# Patient Record
Sex: MALE | Race: BLACK OR AFRICAN AMERICAN | NOT HISPANIC OR LATINO | Employment: UNEMPLOYED | URBAN - NONMETROPOLITAN AREA
[De-identification: names, ages, dates, MRNs, and addresses within clinical notes are randomized per-mention and may not be internally consistent; named-entity substitution may affect disease eponyms.]

---

## 2023-07-27 ENCOUNTER — HOSPITAL ENCOUNTER (INPATIENT)
Facility: HOSPITAL | Age: 53
LOS: 13 days | Discharge: HOME | DRG: 872 | End: 2023-08-09
Attending: STUDENT IN AN ORGANIZED HEALTH CARE EDUCATION/TRAINING PROGRAM | Admitting: STUDENT IN AN ORGANIZED HEALTH CARE EDUCATION/TRAINING PROGRAM

## 2023-07-27 ENCOUNTER — APPOINTMENT (OUTPATIENT)
Dept: CT IMAGING | Facility: HOSPITAL | Age: 53
DRG: 872 | End: 2023-07-27

## 2023-07-27 ENCOUNTER — APPOINTMENT (OUTPATIENT)
Dept: GENERAL RADIOLOGY | Facility: HOSPITAL | Age: 53
DRG: 872 | End: 2023-07-27

## 2023-07-27 DIAGNOSIS — A41.9 SEPSIS, DUE TO UNSPECIFIED ORGANISM, UNSPECIFIED WHETHER ACUTE ORGAN DYSFUNCTION PRESENT: Primary | ICD-10-CM

## 2023-07-27 DIAGNOSIS — N30.90 CYSTITIS: ICD-10-CM

## 2023-07-27 DIAGNOSIS — F10.939 ALCOHOL WITHDRAWAL SYNDROME WITH COMPLICATION: ICD-10-CM

## 2023-07-27 LAB
ALBUMIN SERPL-MCNC: 3.6 G/DL (ref 3.5–5.2)
ALBUMIN/GLOB SERPL: 0.9 G/DL
ALP SERPL-CCNC: 190 U/L (ref 39–117)
ALT SERPL W P-5'-P-CCNC: 73 U/L (ref 1–41)
AMPHET+METHAMPHET UR QL: NEGATIVE
AMPHETAMINES UR QL: NEGATIVE
ANION GAP SERPL CALCULATED.3IONS-SCNC: 12 MMOL/L (ref 5–15)
APTT PPP: 30.9 SECONDS (ref 26.5–34.5)
AST SERPL-CCNC: 190 U/L (ref 1–40)
BACTERIA UR QL AUTO: ABNORMAL /HPF
BARBITURATES UR QL SCN: NEGATIVE
BASOPHILS # BLD AUTO: 0.05 10*3/MM3 (ref 0–0.2)
BASOPHILS NFR BLD AUTO: 0.7 % (ref 0–1.5)
BENZODIAZ UR QL SCN: NEGATIVE
BILIRUB SERPL-MCNC: 0.8 MG/DL (ref 0–1.2)
BILIRUB UR QL STRIP: ABNORMAL
BUN SERPL-MCNC: 8 MG/DL (ref 6–20)
BUN/CREAT SERPL: 9.2 (ref 7–25)
BUPRENORPHINE SERPL-MCNC: NEGATIVE NG/ML
C TRACH RRNA CVX QL NAA+PROBE: NOT DETECTED
CALCIUM SPEC-SCNC: 8.7 MG/DL (ref 8.6–10.5)
CANNABINOIDS SERPL QL: NEGATIVE
CHLORIDE SERPL-SCNC: 92 MMOL/L (ref 98–107)
CHOLEST SERPL-MCNC: 138 MG/DL (ref 0–200)
CLARITY UR: ABNORMAL
CO2 SERPL-SCNC: 29 MMOL/L (ref 22–29)
COCAINE UR QL: NEGATIVE
COLOR UR: ABNORMAL
CREAT SERPL-MCNC: 0.87 MG/DL (ref 0.76–1.27)
D-LACTATE SERPL-SCNC: 1.6 MMOL/L (ref 0.5–2)
D-LACTATE SERPL-SCNC: 2.3 MMOL/L (ref 0.5–2)
D-LACTATE SERPL-SCNC: 3 MMOL/L (ref 0.5–2)
DEPRECATED RDW RBC AUTO: 51.2 FL (ref 37–54)
EGFRCR SERPLBLD CKD-EPI 2021: 103.8 ML/MIN/1.73
EOSINOPHIL # BLD AUTO: 0 10*3/MM3 (ref 0–0.4)
EOSINOPHIL NFR BLD AUTO: 0 % (ref 0.3–6.2)
ERYTHROCYTE [DISTWIDTH] IN BLOOD BY AUTOMATED COUNT: 18.7 % (ref 12.3–15.4)
ETHANOL BLD-MCNC: <10 MG/DL (ref 0–10)
ETHANOL UR QL: <0.01 %
FENTANYL UR-MCNC: NEGATIVE NG/ML
GEN 5 2HR TROPONIN T REFLEX: 22 NG/L
GLOBULIN UR ELPH-MCNC: 4.1 GM/DL
GLUCOSE SERPL-MCNC: 128 MG/DL (ref 65–99)
GLUCOSE UR STRIP-MCNC: NEGATIVE MG/DL
HAV IGM SERPL QL IA: ABNORMAL
HBA1C MFR BLD: 5.6 % (ref 4.8–5.6)
HBV CORE IGM SERPL QL IA: ABNORMAL
HBV SURFACE AG SERPL QL IA: ABNORMAL
HCT VFR BLD AUTO: 31.3 % (ref 37.5–51)
HCV AB SER DONR QL: REACTIVE
HDLC SERPL-MCNC: 110 MG/DL (ref 40–60)
HGB BLD-MCNC: 10.1 G/DL (ref 13–17.7)
HGB UR QL STRIP.AUTO: ABNORMAL
HIV1+2 AB SER QL: NORMAL
HYALINE CASTS UR QL AUTO: ABNORMAL /LPF
IMM GRANULOCYTES # BLD AUTO: 0.06 10*3/MM3 (ref 0–0.05)
IMM GRANULOCYTES NFR BLD AUTO: 0.9 % (ref 0–0.5)
INR PPP: 1.02 (ref 0.9–1.1)
KETONES UR QL STRIP: NEGATIVE
LDLC SERPL CALC-MCNC: 16 MG/DL (ref 0–100)
LDLC/HDLC SERPL: 0.16 {RATIO}
LEUKOCYTE ESTERASE UR QL STRIP.AUTO: ABNORMAL
LIPASE SERPL-CCNC: 19 U/L (ref 13–60)
LYMPHOCYTES # BLD AUTO: 0.76 10*3/MM3 (ref 0.7–3.1)
LYMPHOCYTES NFR BLD AUTO: 11.4 % (ref 19.6–45.3)
MAGNESIUM SERPL-MCNC: 1.2 MG/DL (ref 1.6–2.6)
MAGNESIUM SERPL-MCNC: 1.4 MG/DL (ref 1.6–2.6)
MCH RBC QN AUTO: 25.3 PG (ref 26.6–33)
MCHC RBC AUTO-ENTMCNC: 32.3 G/DL (ref 31.5–35.7)
MCV RBC AUTO: 78.3 FL (ref 79–97)
METHADONE UR QL SCN: NEGATIVE
MONOCYTES # BLD AUTO: 0.64 10*3/MM3 (ref 0.1–0.9)
MONOCYTES NFR BLD AUTO: 9.6 % (ref 5–12)
N GONORRHOEA RRNA SPEC QL NAA+PROBE: NOT DETECTED
NEUTROPHILS NFR BLD AUTO: 5.17 10*3/MM3 (ref 1.7–7)
NEUTROPHILS NFR BLD AUTO: 77.4 % (ref 42.7–76)
NITRITE UR QL STRIP: NEGATIVE
NRBC BLD AUTO-RTO: 0.3 /100 WBC (ref 0–0.2)
OPIATES UR QL: NEGATIVE
OXYCODONE UR QL SCN: NEGATIVE
PCP UR QL SCN: NEGATIVE
PH UR STRIP.AUTO: 7 [PH] (ref 5–8)
PHOSPHATE SERPL-MCNC: 4.4 MG/DL (ref 2.5–4.5)
PLATELET # BLD AUTO: 128 10*3/MM3 (ref 140–450)
PMV BLD AUTO: 9.6 FL (ref 6–12)
POTASSIUM SERPL-SCNC: 4 MMOL/L (ref 3.5–5.2)
PROCALCITONIN SERPL-MCNC: 0.39 NG/ML (ref 0–0.25)
PROPOXYPH UR QL: NEGATIVE
PROT ?TM UR-MCNC: 77.5 MG/DL
PROT SERPL-MCNC: 7.7 G/DL (ref 6–8.5)
PROT UR QL STRIP: ABNORMAL
PROTHROMBIN TIME: 13.9 SECONDS (ref 12.1–14.7)
RBC # BLD AUTO: 4 10*6/MM3 (ref 4.14–5.8)
RBC # UR STRIP: ABNORMAL /HPF
REF LAB TEST METHOD: ABNORMAL
SARS-COV-2 RNA RESP QL NAA+PROBE: NOT DETECTED
SODIUM SERPL-SCNC: 133 MMOL/L (ref 136–145)
SP GR UR STRIP: 1.01 (ref 1–1.03)
SQUAMOUS #/AREA URNS HPF: ABNORMAL /HPF
TRANS CELLS #/AREA URNS HPF: ABNORMAL /HPF
TRICYCLICS UR QL SCN: NEGATIVE
TRIGL SERPL-MCNC: 53 MG/DL (ref 0–150)
TROPONIN T DELTA: -2 NG/L
TROPONIN T SERPL HS-MCNC: 24 NG/L
TSH SERPL DL<=0.05 MIU/L-ACNC: 1.12 UIU/ML (ref 0.27–4.2)
UROBILINOGEN UR QL STRIP: ABNORMAL
VLDLC SERPL-MCNC: 12 MG/DL (ref 5–40)
WBC # UR STRIP: ABNORMAL /HPF
WBC NRBC COR # BLD: 6.68 10*3/MM3 (ref 3.4–10.8)
YEAST URNS QL MICRO: ABNORMAL /HPF

## 2023-07-27 PROCEDURE — 74176 CT ABD & PELVIS W/O CONTRAST: CPT | Performed by: RADIOLOGY

## 2023-07-27 PROCEDURE — 25010000002 MAGNESIUM SULFATE 2 GM/50ML SOLUTION: Performed by: STUDENT IN AN ORGANIZED HEALTH CARE EDUCATION/TRAINING PROGRAM

## 2023-07-27 PROCEDURE — 80053 COMPREHEN METABOLIC PANEL: CPT | Performed by: STUDENT IN AN ORGANIZED HEALTH CARE EDUCATION/TRAINING PROGRAM

## 2023-07-27 PROCEDURE — 85025 COMPLETE CBC W/AUTO DIFF WBC: CPT | Performed by: STUDENT IN AN ORGANIZED HEALTH CARE EDUCATION/TRAINING PROGRAM

## 2023-07-27 PROCEDURE — 84484 ASSAY OF TROPONIN QUANT: CPT | Performed by: STUDENT IN AN ORGANIZED HEALTH CARE EDUCATION/TRAINING PROGRAM

## 2023-07-27 PROCEDURE — 87150 DNA/RNA AMPLIFIED PROBE: CPT | Performed by: STUDENT IN AN ORGANIZED HEALTH CARE EDUCATION/TRAINING PROGRAM

## 2023-07-27 PROCEDURE — 71275 CT ANGIOGRAPHY CHEST: CPT | Performed by: RADIOLOGY

## 2023-07-27 PROCEDURE — 87186 SC STD MICRODIL/AGAR DIL: CPT | Performed by: STUDENT IN AN ORGANIZED HEALTH CARE EDUCATION/TRAINING PROGRAM

## 2023-07-27 PROCEDURE — G0432 EIA HIV-1/HIV-2 SCREEN: HCPCS | Performed by: STUDENT IN AN ORGANIZED HEALTH CARE EDUCATION/TRAINING PROGRAM

## 2023-07-27 PROCEDURE — 87077 CULTURE AEROBIC IDENTIFY: CPT | Performed by: STUDENT IN AN ORGANIZED HEALTH CARE EDUCATION/TRAINING PROGRAM

## 2023-07-27 PROCEDURE — 85730 THROMBOPLASTIN TIME PARTIAL: CPT | Performed by: STUDENT IN AN ORGANIZED HEALTH CARE EDUCATION/TRAINING PROGRAM

## 2023-07-27 PROCEDURE — 87077 CULTURE AEROBIC IDENTIFY: CPT

## 2023-07-27 PROCEDURE — 83690 ASSAY OF LIPASE: CPT | Performed by: STUDENT IN AN ORGANIZED HEALTH CARE EDUCATION/TRAINING PROGRAM

## 2023-07-27 PROCEDURE — 80074 ACUTE HEPATITIS PANEL: CPT | Performed by: STUDENT IN AN ORGANIZED HEALTH CARE EDUCATION/TRAINING PROGRAM

## 2023-07-27 PROCEDURE — 84156 ASSAY OF PROTEIN URINE: CPT | Performed by: STUDENT IN AN ORGANIZED HEALTH CARE EDUCATION/TRAINING PROGRAM

## 2023-07-27 PROCEDURE — 25010000002 PIPERACILLIN SOD-TAZOBACTAM PER 1 G: Performed by: STUDENT IN AN ORGANIZED HEALTH CARE EDUCATION/TRAINING PROGRAM

## 2023-07-27 PROCEDURE — 71045 X-RAY EXAM CHEST 1 VIEW: CPT

## 2023-07-27 PROCEDURE — 82077 ASSAY SPEC XCP UR&BREATH IA: CPT | Performed by: STUDENT IN AN ORGANIZED HEALTH CARE EDUCATION/TRAINING PROGRAM

## 2023-07-27 PROCEDURE — 93010 ELECTROCARDIOGRAM REPORT: CPT | Performed by: INTERNAL MEDICINE

## 2023-07-27 PROCEDURE — 85610 PROTHROMBIN TIME: CPT | Performed by: STUDENT IN AN ORGANIZED HEALTH CARE EDUCATION/TRAINING PROGRAM

## 2023-07-27 PROCEDURE — 36415 COLL VENOUS BLD VENIPUNCTURE: CPT

## 2023-07-27 PROCEDURE — 82746 ASSAY OF FOLIC ACID SERUM: CPT | Performed by: STUDENT IN AN ORGANIZED HEALTH CARE EDUCATION/TRAINING PROGRAM

## 2023-07-27 PROCEDURE — 99285 EMERGENCY DEPT VISIT HI MDM: CPT

## 2023-07-27 PROCEDURE — 84443 ASSAY THYROID STIM HORMONE: CPT | Performed by: STUDENT IN AN ORGANIZED HEALTH CARE EDUCATION/TRAINING PROGRAM

## 2023-07-27 PROCEDURE — 87186 SC STD MICRODIL/AGAR DIL: CPT

## 2023-07-27 PROCEDURE — 87591 N.GONORRHOEAE DNA AMP PROB: CPT

## 2023-07-27 PROCEDURE — 81001 URINALYSIS AUTO W/SCOPE: CPT | Performed by: STUDENT IN AN ORGANIZED HEALTH CARE EDUCATION/TRAINING PROGRAM

## 2023-07-27 PROCEDURE — 84145 PROCALCITONIN (PCT): CPT | Performed by: STUDENT IN AN ORGANIZED HEALTH CARE EDUCATION/TRAINING PROGRAM

## 2023-07-27 PROCEDURE — 25010000002 THIAMINE PER 100 MG: Performed by: STUDENT IN AN ORGANIZED HEALTH CARE EDUCATION/TRAINING PROGRAM

## 2023-07-27 PROCEDURE — 80307 DRUG TEST PRSMV CHEM ANLYZR: CPT | Performed by: STUDENT IN AN ORGANIZED HEALTH CARE EDUCATION/TRAINING PROGRAM

## 2023-07-27 PROCEDURE — 83735 ASSAY OF MAGNESIUM: CPT | Performed by: STUDENT IN AN ORGANIZED HEALTH CARE EDUCATION/TRAINING PROGRAM

## 2023-07-27 PROCEDURE — 25010000002 LORAZEPAM PER 2 MG: Performed by: STUDENT IN AN ORGANIZED HEALTH CARE EDUCATION/TRAINING PROGRAM

## 2023-07-27 PROCEDURE — 84100 ASSAY OF PHOSPHORUS: CPT | Performed by: INTERNAL MEDICINE

## 2023-07-27 PROCEDURE — 87040 BLOOD CULTURE FOR BACTERIA: CPT | Performed by: STUDENT IN AN ORGANIZED HEALTH CARE EDUCATION/TRAINING PROGRAM

## 2023-07-27 PROCEDURE — 71275 CT ANGIOGRAPHY CHEST: CPT

## 2023-07-27 PROCEDURE — 82570 ASSAY OF URINE CREATININE: CPT | Performed by: STUDENT IN AN ORGANIZED HEALTH CARE EDUCATION/TRAINING PROGRAM

## 2023-07-27 PROCEDURE — 83605 ASSAY OF LACTIC ACID: CPT | Performed by: STUDENT IN AN ORGANIZED HEALTH CARE EDUCATION/TRAINING PROGRAM

## 2023-07-27 PROCEDURE — 74176 CT ABD & PELVIS W/O CONTRAST: CPT

## 2023-07-27 PROCEDURE — 83036 HEMOGLOBIN GLYCOSYLATED A1C: CPT | Performed by: STUDENT IN AN ORGANIZED HEALTH CARE EDUCATION/TRAINING PROGRAM

## 2023-07-27 PROCEDURE — 87086 URINE CULTURE/COLONY COUNT: CPT

## 2023-07-27 PROCEDURE — 71045 X-RAY EXAM CHEST 1 VIEW: CPT | Performed by: RADIOLOGY

## 2023-07-27 PROCEDURE — 25010000002 ENOXAPARIN PER 10 MG: Performed by: STUDENT IN AN ORGANIZED HEALTH CARE EDUCATION/TRAINING PROGRAM

## 2023-07-27 PROCEDURE — 82607 VITAMIN B-12: CPT | Performed by: STUDENT IN AN ORGANIZED HEALTH CARE EDUCATION/TRAINING PROGRAM

## 2023-07-27 PROCEDURE — 87491 CHLMYD TRACH DNA AMP PROBE: CPT

## 2023-07-27 PROCEDURE — 99223 1ST HOSP IP/OBS HIGH 75: CPT

## 2023-07-27 PROCEDURE — 25510000001 IOPAMIDOL PER 1 ML: Performed by: STUDENT IN AN ORGANIZED HEALTH CARE EDUCATION/TRAINING PROGRAM

## 2023-07-27 PROCEDURE — 87635 SARS-COV-2 COVID-19 AMP PRB: CPT | Performed by: STUDENT IN AN ORGANIZED HEALTH CARE EDUCATION/TRAINING PROGRAM

## 2023-07-27 PROCEDURE — 25010000002 LABETALOL 5 MG/ML SOLUTION: Performed by: STUDENT IN AN ORGANIZED HEALTH CARE EDUCATION/TRAINING PROGRAM

## 2023-07-27 PROCEDURE — 93005 ELECTROCARDIOGRAM TRACING: CPT | Performed by: STUDENT IN AN ORGANIZED HEALTH CARE EDUCATION/TRAINING PROGRAM

## 2023-07-27 PROCEDURE — 80061 LIPID PANEL: CPT | Performed by: STUDENT IN AN ORGANIZED HEALTH CARE EDUCATION/TRAINING PROGRAM

## 2023-07-27 RX ORDER — ENOXAPARIN SODIUM 100 MG/ML
40 INJECTION SUBCUTANEOUS NIGHTLY
Status: DISCONTINUED | OUTPATIENT
Start: 2023-07-27 | End: 2023-08-10 | Stop reason: HOSPADM

## 2023-07-27 RX ORDER — ASPIRIN 81 MG/1
324 TABLET, CHEWABLE ORAL ONCE
Status: COMPLETED | OUTPATIENT
Start: 2023-07-27 | End: 2023-07-27

## 2023-07-27 RX ORDER — SODIUM CHLORIDE, SODIUM LACTATE, POTASSIUM CHLORIDE, CALCIUM CHLORIDE 600; 310; 30; 20 MG/100ML; MG/100ML; MG/100ML; MG/100ML
125 INJECTION, SOLUTION INTRAVENOUS CONTINUOUS
Status: DISCONTINUED | OUTPATIENT
Start: 2023-07-27 | End: 2023-07-29

## 2023-07-27 RX ORDER — LORAZEPAM 2 MG/ML
4 INJECTION INTRAMUSCULAR
Status: DISCONTINUED | OUTPATIENT
Start: 2023-07-27 | End: 2023-07-29

## 2023-07-27 RX ORDER — ASPIRIN 81 MG/1
81 TABLET, CHEWABLE ORAL DAILY
Status: DISCONTINUED | OUTPATIENT
Start: 2023-07-28 | End: 2023-08-10 | Stop reason: HOSPADM

## 2023-07-27 RX ORDER — MAGNESIUM SULFATE HEPTAHYDRATE 40 MG/ML
2 INJECTION, SOLUTION INTRAVENOUS
Status: COMPLETED | OUTPATIENT
Start: 2023-07-27 | End: 2023-07-28

## 2023-07-27 RX ORDER — LORAZEPAM 2 MG/ML
2 INJECTION INTRAMUSCULAR
Status: DISCONTINUED | OUTPATIENT
Start: 2023-07-27 | End: 2023-07-29

## 2023-07-27 RX ORDER — SODIUM CHLORIDE 0.9 % (FLUSH) 0.9 %
10 SYRINGE (ML) INJECTION AS NEEDED
Status: DISCONTINUED | OUTPATIENT
Start: 2023-07-27 | End: 2023-08-10 | Stop reason: HOSPADM

## 2023-07-27 RX ORDER — POLYETHYLENE GLYCOL 3350 17 G/17G
17 POWDER, FOR SOLUTION ORAL DAILY PRN
Status: DISCONTINUED | OUTPATIENT
Start: 2023-07-27 | End: 2023-07-30

## 2023-07-27 RX ORDER — BISACODYL 5 MG/1
5 TABLET, DELAYED RELEASE ORAL DAILY PRN
Status: DISCONTINUED | OUTPATIENT
Start: 2023-07-27 | End: 2023-07-30

## 2023-07-27 RX ORDER — LORAZEPAM 1 MG/1
1 TABLET ORAL EVERY 6 HOURS
Status: DISCONTINUED | OUTPATIENT
Start: 2023-07-28 | End: 2023-07-27

## 2023-07-27 RX ORDER — CARVEDILOL 3.12 MG/1
3.12 TABLET ORAL 2 TIMES DAILY WITH MEALS
Status: DISCONTINUED | OUTPATIENT
Start: 2023-07-27 | End: 2023-07-28

## 2023-07-27 RX ORDER — LORAZEPAM 2 MG/1
2 TABLET ORAL
Status: DISCONTINUED | OUTPATIENT
Start: 2023-07-27 | End: 2023-07-27

## 2023-07-27 RX ORDER — SODIUM CHLORIDE 9 MG/ML
40 INJECTION, SOLUTION INTRAVENOUS AS NEEDED
Status: DISCONTINUED | OUTPATIENT
Start: 2023-07-27 | End: 2023-08-10 | Stop reason: HOSPADM

## 2023-07-27 RX ORDER — ATORVASTATIN CALCIUM 40 MG/1
40 TABLET, FILM COATED ORAL NIGHTLY
Status: DISCONTINUED | OUTPATIENT
Start: 2023-07-27 | End: 2023-08-10 | Stop reason: HOSPADM

## 2023-07-27 RX ORDER — LORAZEPAM 2 MG/ML
2 INJECTION INTRAMUSCULAR
Status: DISCONTINUED | OUTPATIENT
Start: 2023-07-27 | End: 2023-07-27

## 2023-07-27 RX ORDER — ACETAMINOPHEN 325 MG/1
650 TABLET ORAL EVERY 4 HOURS PRN
Status: DISCONTINUED | OUTPATIENT
Start: 2023-07-27 | End: 2023-08-10 | Stop reason: HOSPADM

## 2023-07-27 RX ORDER — LORAZEPAM 2 MG/ML
1 INJECTION INTRAMUSCULAR EVERY 6 HOURS
Status: DISCONTINUED | OUTPATIENT
Start: 2023-07-28 | End: 2023-07-29

## 2023-07-27 RX ORDER — METHION/INOS/CHOL BT/B COM/LIV 110MG-86MG
100 CAPSULE ORAL DAILY
Status: DISCONTINUED | OUTPATIENT
Start: 2023-08-01 | End: 2023-08-01

## 2023-07-27 RX ORDER — NITROGLYCERIN 0.4 MG/1
0.4 TABLET SUBLINGUAL
Status: DISCONTINUED | OUTPATIENT
Start: 2023-07-27 | End: 2023-08-10 | Stop reason: HOSPADM

## 2023-07-27 RX ORDER — LORAZEPAM 2 MG/1
4 TABLET ORAL
Status: DISCONTINUED | OUTPATIENT
Start: 2023-07-27 | End: 2023-07-29

## 2023-07-27 RX ORDER — LORAZEPAM 1 MG/1
1 TABLET ORAL
Status: DISCONTINUED | OUTPATIENT
Start: 2023-07-27 | End: 2023-07-29

## 2023-07-27 RX ORDER — ACETAMINOPHEN 500 MG
1000 TABLET ORAL ONCE
Status: COMPLETED | OUTPATIENT
Start: 2023-07-27 | End: 2023-07-27

## 2023-07-27 RX ORDER — LORAZEPAM 2 MG/ML
2 INJECTION INTRAMUSCULAR EVERY 6 HOURS
Status: COMPLETED | OUTPATIENT
Start: 2023-07-27 | End: 2023-07-28

## 2023-07-27 RX ORDER — THIAMINE HYDROCHLORIDE 100 MG/ML
200 INJECTION, SOLUTION INTRAMUSCULAR; INTRAVENOUS EVERY 8 HOURS SCHEDULED
Status: DISCONTINUED | OUTPATIENT
Start: 2023-07-27 | End: 2023-08-01

## 2023-07-27 RX ORDER — LORAZEPAM 2 MG/1
2 TABLET ORAL
Status: DISCONTINUED | OUTPATIENT
Start: 2023-07-27 | End: 2023-07-29

## 2023-07-27 RX ORDER — CLONIDINE HYDROCHLORIDE 0.1 MG/1
0.1 TABLET ORAL ONCE
Status: COMPLETED | OUTPATIENT
Start: 2023-07-27 | End: 2023-07-27

## 2023-07-27 RX ORDER — SODIUM CHLORIDE 0.9 % (FLUSH) 0.9 %
10 SYRINGE (ML) INJECTION EVERY 12 HOURS SCHEDULED
Status: DISCONTINUED | OUTPATIENT
Start: 2023-07-27 | End: 2023-08-10 | Stop reason: HOSPADM

## 2023-07-27 RX ORDER — LORAZEPAM 2 MG/1
2 TABLET ORAL EVERY 6 HOURS
Status: DISCONTINUED | OUTPATIENT
Start: 2023-07-27 | End: 2023-07-27

## 2023-07-27 RX ORDER — MULTIPLE VITAMINS W/ MINERALS TAB 9MG-400MCG
1 TAB ORAL DAILY
Status: DISCONTINUED | OUTPATIENT
Start: 2023-07-27 | End: 2023-07-29

## 2023-07-27 RX ORDER — KETOROLAC TROMETHAMINE 30 MG/ML
30 INJECTION, SOLUTION INTRAMUSCULAR; INTRAVENOUS EVERY 6 HOURS PRN
Status: DISCONTINUED | OUTPATIENT
Start: 2023-07-27 | End: 2023-07-29

## 2023-07-27 RX ORDER — LORAZEPAM 2 MG/ML
1 INJECTION INTRAMUSCULAR
Status: DISCONTINUED | OUTPATIENT
Start: 2023-07-27 | End: 2023-07-29

## 2023-07-27 RX ORDER — AMOXICILLIN 250 MG
2 CAPSULE ORAL 2 TIMES DAILY
Status: DISCONTINUED | OUTPATIENT
Start: 2023-07-27 | End: 2023-07-30

## 2023-07-27 RX ORDER — LORAZEPAM 1 MG/1
1 TABLET ORAL
Status: DISCONTINUED | OUTPATIENT
Start: 2023-07-27 | End: 2023-07-27

## 2023-07-27 RX ORDER — LABETALOL HYDROCHLORIDE 5 MG/ML
10 INJECTION, SOLUTION INTRAVENOUS EVERY 6 HOURS PRN
Status: DISCONTINUED | OUTPATIENT
Start: 2023-07-27 | End: 2023-07-29

## 2023-07-27 RX ORDER — BISACODYL 10 MG
10 SUPPOSITORY, RECTAL RECTAL DAILY PRN
Status: DISCONTINUED | OUTPATIENT
Start: 2023-07-27 | End: 2023-07-30

## 2023-07-27 RX ORDER — LORAZEPAM 2 MG/ML
1 INJECTION INTRAMUSCULAR
Status: DISCONTINUED | OUTPATIENT
Start: 2023-07-27 | End: 2023-07-27

## 2023-07-27 RX ADMIN — ACETAMINOPHEN 1000 MG: 500 TABLET ORAL at 14:52

## 2023-07-27 RX ADMIN — ATORVASTATIN CALCIUM 40 MG: 40 TABLET, FILM COATED ORAL at 20:50

## 2023-07-27 RX ADMIN — Medication 10 ML: at 20:52

## 2023-07-27 RX ADMIN — ENOXAPARIN SODIUM 40 MG: 40 INJECTION SUBCUTANEOUS at 20:50

## 2023-07-27 RX ADMIN — LORAZEPAM 2 MG: 2 INJECTION INTRAMUSCULAR; INTRAVENOUS at 18:46

## 2023-07-27 RX ADMIN — CARVEDILOL 3.12 MG: 3.12 TABLET, FILM COATED ORAL at 20:50

## 2023-07-27 RX ADMIN — LABETALOL HYDROCHLORIDE 10 MG: 5 INJECTION, SOLUTION INTRAVENOUS at 23:06

## 2023-07-27 RX ADMIN — THIAMINE HYDROCHLORIDE 200 MG: 100 INJECTION, SOLUTION INTRAMUSCULAR; INTRAVENOUS at 13:05

## 2023-07-27 RX ADMIN — PIPERACILLIN SODIUM AND TAZOBACTAM SODIUM 3.38 G: 3; .375 INJECTION, POWDER, LYOPHILIZED, FOR SOLUTION INTRAVENOUS at 13:00

## 2023-07-27 RX ADMIN — MAGNESIUM SULFATE HEPTAHYDRATE 2 G: 40 INJECTION, SOLUTION INTRAVENOUS at 20:43

## 2023-07-27 RX ADMIN — DOCUSATE SODIUM 50 MG AND SENNOSIDES 8.6 MG 2 TABLET: 8.6; 5 TABLET, FILM COATED ORAL at 20:51

## 2023-07-27 RX ADMIN — IOPAMIDOL 100 ML: 755 INJECTION, SOLUTION INTRAVENOUS at 14:19

## 2023-07-27 RX ADMIN — MAGNESIUM SULFATE HEPTAHYDRATE 2 G: 40 INJECTION, SOLUTION INTRAVENOUS at 23:07

## 2023-07-27 RX ADMIN — PIPERACILLIN SODIUM AND TAZOBACTAM SODIUM 4.5 G: 4; .5 INJECTION, POWDER, LYOPHILIZED, FOR SOLUTION INTRAVENOUS at 20:44

## 2023-07-27 RX ADMIN — LORAZEPAM 2 MG: 2 TABLET ORAL at 11:55

## 2023-07-27 RX ADMIN — MAGNESIUM SULFATE HEPTAHYDRATE 2 G: 40 INJECTION, SOLUTION INTRAVENOUS at 18:45

## 2023-07-27 RX ADMIN — Medication 1 TABLET: at 20:50

## 2023-07-27 RX ADMIN — LORAZEPAM 1 MG: 2 INJECTION INTRAMUSCULAR; INTRAVENOUS at 13:42

## 2023-07-27 RX ADMIN — SODIUM CHLORIDE, POTASSIUM CHLORIDE, SODIUM LACTATE AND CALCIUM CHLORIDE 125 ML/HR: 600; 310; 30; 20 INJECTION, SOLUTION INTRAVENOUS at 18:47

## 2023-07-27 RX ADMIN — CLONIDINE HYDROCHLORIDE 0.1 MG: 0.1 TABLET ORAL at 13:24

## 2023-07-27 RX ADMIN — DOXYCYCLINE 100 MG: 100 INJECTION, POWDER, LYOPHILIZED, FOR SOLUTION INTRAVENOUS at 18:46

## 2023-07-27 RX ADMIN — FOLIC ACID 1 MG: 5 INJECTION, SOLUTION INTRAMUSCULAR; INTRAVENOUS; SUBCUTANEOUS at 12:31

## 2023-07-27 RX ADMIN — ASPIRIN 324 MG: 81 TABLET, CHEWABLE ORAL at 18:45

## 2023-07-27 RX ADMIN — SODIUM CHLORIDE 2448 ML: 9 INJECTION, SOLUTION INTRAVENOUS at 11:23

## 2023-07-27 NOTE — ED PROVIDER NOTES
Subjective   History of Present Illness  52-year-old male presents to the ER for possible alcohol detox.  Patient noted persistent nausea vomiting with subjective fever.  Slight tremors noted.  Patient had intermittent nausea.  No severe vomiting.  Confirmed diffuse fatigue and malaise.  Patient is febrile on presentation.  No hematemesis or hematochezia.  Tachycardia noted.  Blood pressure stable.  Sepsis screening positive.    Review of Systems   Constitutional:  Positive for appetite change, chills, fatigue and fever.   Gastrointestinal:  Positive for nausea and vomiting.   Neurological:  Positive for tremors.   Psychiatric/Behavioral:  The patient is nervous/anxious.    All other systems reviewed and are negative.    No past medical history on file.    No Known Allergies    No past surgical history on file.    No family history on file.    Social History     Socioeconomic History    Marital status: Single           Objective   Physical Exam  Constitutional:       General: He is not in acute distress.     Appearance: He is well-developed. He is ill-appearing.   HENT:      Head: Normocephalic and atraumatic.   Eyes:      Extraocular Movements: Extraocular movements intact.      Pupils: Pupils are equal, round, and reactive to light.   Neck:      Vascular: No JVD.   Cardiovascular:      Rate and Rhythm: Regular rhythm. Tachycardia present.      Heart sounds: Normal heart sounds. No murmur heard.  Pulmonary:      Effort: No tachypnea, accessory muscle usage or respiratory distress.      Breath sounds: Normal breath sounds. No stridor. No decreased breath sounds, wheezing, rhonchi or rales.   Chest:      Chest wall: No deformity, tenderness or crepitus.   Abdominal:      General: Bowel sounds are normal.      Palpations: Abdomen is soft.      Tenderness: There is generalized abdominal tenderness. There is no guarding or rebound.   Musculoskeletal:         General: Normal range of motion.      Cervical back: Normal  range of motion and neck supple.      Right lower leg: No tenderness. No edema.      Left lower leg: No tenderness. No edema.   Lymphadenopathy:      Cervical: No cervical adenopathy.   Skin:     General: Skin is warm and dry.      Coloration: Skin is not cyanotic.      Findings: No ecchymosis or erythema.   Neurological:      General: No focal deficit present.      Mental Status: He is alert and oriented to person, place, and time.      Cranial Nerves: No cranial nerve deficit.      Motor: No weakness.      Comments: Tremulous   Psychiatric:         Mood and Affect: Mood normal. Mood is not anxious.         Behavior: Behavior normal. Behavior is not agitated.       Procedures           ED Course  ED Course as of 07/27/23 1459   Thu Jul 27, 2023   1212 EKG notes sinus rhythm.  QTc 484.  No acute ST elevation.  92 bpm    Electronically signed by Colby Lam DO, 07/27/23, 12:12 PM EDT.   [SF]   1334 XR Chest 1 View [SF]      ED Course User Index  [SF] Colby Lam DO                                           Medical Decision Making  Sepsis protocol initiated.  Broad-spectrum antibiotics given.  IV fluid bolus given.  Leukocytosis noted.  CMP unremarkable.  Febrile.  Inflammatory markers elevated.  CT of the abdomen and pelvis no concerns for cystitis.  Chest x-ray notes aortic valve changes.  No obvious pneumonia.  Hospitalist team requested CT angiogram.  Concerns for sepsis in addition to acute alcohol withdrawal.  Recommend admission for further work up and treatment.  Hospitalist team consulted and made aware of the patient.  Consults and orders placed per hospitalist request.  Patient was agreeable to admission plan.  Vitals stable on admission.    Amount and/or Complexity of Data Reviewed  Labs: ordered. Decision-making details documented in ED Course.  Radiology: ordered. Decision-making details documented in ED Course.  ECG/medicine tests: ordered.    Risk  OTC drugs.  Prescription drug  management.  Decision regarding hospitalization.        Final diagnoses:   Sepsis, due to unspecified organism, unspecified whether acute organ dysfunction present   Cystitis   Alcohol withdrawal syndrome with complication       ED Disposition  ED Disposition       ED Disposition   Decision to Admit    Condition   --    Comment   Level of Care: Critical Care [6]   Diagnosis: Alcohol withdrawal [291.81.ICD-9-CM]   Certification: I Certify That Inpatient Hospital Services Are Medically Necessary For Greater Than 2 Midnights                 No follow-up provider specified.       Medication List      No changes were made to your prescriptions during this visit.            Colby Lam,   07/27/23 1459

## 2023-07-27 NOTE — H&P
"  [facility] Medicine Services  History & Physical    Patient Identification:  Name:  Edvin Jiménez  Age:  52 y.o.  Sex:  male  :  1970  MRN:  [id number]   Visit Number:  [id number]  Admit Date: 2023   Primary Care Physician:  Provider, No Known    Subjective     Chief complaint: Chest Pain, Vomiting     History of presenting illness:      Edvin Jiménez is a 52 y.o. male with past medical history significant for alcohol use disorder. Patient presents to [facility] emergency department today for further evaluation of chest pain and vomiting. On exam, patient was mildly lethargic due to having just received IV and PO Ativan for EtOH withdrawal symptoms. Patient states that dull, left-sided chest pain and vomiting began this morning after drinking his typical amount of alcohol last night. Usually drinks \"6 quarts\" of \"Melquiades Beam or whatever he can get\". He says that he started drinking 2 years ago. Last drink was last night (). Ethanol level negative on arrival. Denies illicit substance abuse or tobacco abuse. He states that he \"lives at the [employer]\" and was there this morning when symptoms began. Patient is overall a very poor historian at this time due to lethargy and acute withdrawal. He denies any past medical history. States that he does not take any daily prescribed medications. He does endorse some burning with urination that started \"a few days ago\". He doesn't think he's had a fever or chills. He currently reports mild anxiety and mild headache. He is not tremulous or diaphoretic. He states that he is interested in alcohol detox and quitting. Discussed plan for admission with patient. He voiced agreement and had no further questions.     Upon arrival to the ED, vital signs were temperature 99.9, pulse 107, respirations 22, blood pressure 166/108 sitting, SPO2 saturation 98% on room air.  High-sensitivity troponin T 24 with -2 delta.  CMP with " sodium 143, chloride 93, glucose 128, magnesium 1.2, alkaline phosphatase 190, , ALT 73, otherwise unremarkable.  Lactate 2.3.  Lipase 19.  Procalcitonin 0.49.  PT 14.9, INR 1.02.  CBC with RBCs 4.00, hemoglobin 10.1, hematocrit 31.3, platelets 128, RDW 18.7, MCV 78.3, MCH 25.3, otherwise unremarkable.  Urinalysis with 3+ blood, 3+ leukocytes, 2+ protein, 1+ bilirubin, TNTC RBCs, TNTC WBCs, 36 squamous epithelial cells, and 3-6 transitional epithelial cells.  Peripheral blood cultures x2 pending.  COVID-19 negative.  Urine drug screen negative.  Ethanol level negative.  Chest x-ray with a sending aortic prominence that may represent aortic aneurysm.  CT angiogram of the chest pending.  CT of the abdomen and pelvis without contrast noted bladder wall thickening which may be due to decompressed state of the bladder or due to cystitis.  Calcification of the pancreas suggestive of chronic pancreatitis. Unremarkable liver.    Known Emergency Department medications received prior to my evaluation included 0.1 mg oral clonidine, IV folic acid, 3.375 g IV Zosyn, 2448 mL normal saline bolus, 200 mg IV thiamine, and total of 2 mg oral Ativan and 1 mg IV Ativan. Emergency Department Room location at the time of my evaluation was 113.  Discussed with admitting physician, Christian Parson DO.    ---------------------------------------------------------------------------------------------------------------------   Review of Systems   Constitutional:  Negative for chills, fatigue and fever.   HENT:  Negative for congestion, sore throat and trouble swallowing.    Respiratory:  Negative for cough, shortness of breath and wheezing.    Cardiovascular:  Positive for chest pain. Negative for palpitations and leg swelling.   Gastrointestinal:  Positive for nausea and vomiting. Negative for abdominal distention, abdominal pain, constipation and diarrhea.   Genitourinary:  Positive for dysuria. Negative for difficulty  urinating, flank pain, frequency and urgency.   Musculoskeletal:  Negative for back pain, gait problem, neck pain and neck stiffness.   Neurological:  Positive for headaches. Negative for dizziness, tremors, seizures, syncope, facial asymmetry, speech difficulty, weakness, light-headedness and numbness.   Psychiatric/Behavioral:  The patient is nervous/anxious.     ---------------------------------------------------------------------------------------------------------------------   No past medical history on file.  No past surgical history on file.  No family history on file.  Social History     Socioeconomic History    Marital status: Single     ---------------------------------------------------------------------------------------------------------------------   Allergies:  Patient has no known allergies.  ---------------------------------------------------------------------------------------------------------------------   Home medications:    Medications below are reported home medications pulling from within the system; at this time, these medications have not been reconciled unless otherwise specified and are in the verification process for further verifcation as current home medications.  (Not in a hospital admission)      Hospital Scheduled Meds:  folic acid (FOLVITE) IVPB, 1 mg, Intravenous, Daily  LORazepam, 2 mg, Oral, Q6H   Followed by  [START ON 7/28/2023] LORazepam, 1 mg, Oral, Q6H  thiamine (B-1) IV, 200 mg, Intravenous, Q8H   Followed by  [START ON 8/1/2023] thiamine, 100 mg, Oral, Daily           Current listed hospital scheduled medications may not yet reflect those currently placed in orders that are signed and held awaiting patient's arrival to floor.   ---------------------------------------------------------------------------------------------------------------------     Objective     Vital Signs:  Temp:  [99.9 øF (37.7 øC)-101 øF (38.3 øC)] 101 øF (38.3 øC)  Heart Rate:  [] 90  Resp:   [22] 22  BP: (166-174)/(108-131) 166/131      07/27/23  1051   Weight: 81.6 kg (180 lb)     Body mass index is 29.05 kg/mý.  ---------------------------------------------------------------------------------------------------------------------       Physical Exam  Vitals and nursing note reviewed.   Constitutional:       General: He is not in acute distress.     Appearance: He is ill-appearing. He is not diaphoretic.      Comments: Room air.   HENT:      Head: Normocephalic and atraumatic.      Mouth/Throat:      Mouth: Mucous membranes are dry.      Pharynx: Oropharynx is clear.   Eyes:      Pupils: Pupils are equal, round, and reactive to light.   Cardiovascular:      Rate and Rhythm: Regular rhythm. Tachycardia present.      Pulses: Normal pulses.           Dorsalis pedis pulses are 1+ on the right side and 1+ on the left side.      Heart sounds: Normal heart sounds. No murmur heard.    No friction rub.   Pulmonary:      Effort: Pulmonary effort is normal. No accessory muscle usage, respiratory distress or retractions.      Breath sounds: No wheezing, rhonchi or rales.   Abdominal:      General: Abdomen is flat. Bowel sounds are normal. There is no distension.      Palpations: Abdomen is soft. There is no mass.      Tenderness: There is no abdominal tenderness. There is no guarding.      Hernia: No hernia is present.   Musculoskeletal:      Cervical back: Neck supple. No rigidity.      Right lower leg: No edema.      Left lower leg: No edema.   Skin:     General: Skin is warm and dry.      Capillary Refill: Capillary refill takes 2 to 3 seconds.   Neurological:      Mental Status: He is easily aroused. He is lethargic.      Cranial Nerves: No facial asymmetry.      Sensory: Sensation is intact.      Motor: Weakness (mild, generalized) present. No tremor or seizure activity.   Psychiatric:         Attention and Perception: He is inattentive.         Mood and Affect: Mood is not anxious. Affect is flat.          Speech: Speech is slurred.         Behavior: Behavior is slowed. Behavior is not agitated, aggressive or combative. Behavior is cooperative.     ---------------------------------------------------------------------------------------------------------------------  EKG:  Pending formal cardiology interpretation. Per my review, appears normal sinus rhythm 90s with mildly prolonged QT interval of 484 ms. No significant ST elevations or depressions.     Telemetry:  Appearing sinus tachycardia 100s on my exam.   I have personally looked at both the EKG and the telemetry strips.  ---------------------------------------------------------------------------------------------------------------------   Results from last 7 days   Lab Units 07/27/23  1236 07/27/23  1108   LACTATE mmol/L 2.3*  --    WBC 10*3/mm3  --  6.68   HEMOGLOBIN g/dL  --  10.1*   HEMATOCRIT %  --  31.3*   MCV fL  --  78.3*   MCHC g/dL  --  32.3   PLATELETS 10*3/mm3  --  128*   INR  1.02  --          Results from last 7 days   Lab Units 07/27/23  1108   SODIUM mmol/L 133*   POTASSIUM mmol/L 4.0   MAGNESIUM mg/dL 1.2*   CHLORIDE mmol/L 92*   CO2 mmol/L 29.0   BUN mg/dL 8   CREATININE mg/dL 0.87   CALCIUM mg/dL 8.7   GLUCOSE mg/dL 128*   ALBUMIN g/dL 3.6   BILIRUBIN mg/dL 0.8   ALK PHOS U/L 190*   AST (SGOT) U/L 190*   ALT (SGPT) U/L 73*   Estimated Creatinine Clearance: 99.6 mL/min (by C-G formula based on SCr of 0.87 mg/dL).  No results found for: AMMONIA  Results from last 7 days   Lab Units 07/27/23  1236 07/27/23  1108   HSTROP T ng/L 22* 24*         No results found for: HGBA1C  No results found for: TSH, FREET4  No results found for: PREGTESTUR, PREGSERUM, HCG, HCGQUANT  Pain Management Panel          Latest Ref Rng & Units 7/27/2023   Pain Management Panel   Amphetamine, Urine Qual Negative Negative    Barbiturates Screen, Urine Negative Negative    Benzodiazepine Screen, Urine Negative Negative    Buprenorphine, Screen, Urine Negative Negative     Cocaine Screen, Urine Negative Negative    Fentanyl, Urine Negative Negative    Methadone Screen , Urine Negative Negative    Methamphetamine, Ur Negative Negative          ---------------------------------------------------------------------------------------------------------------------  Imaging Results (Last 7 Days)       Procedure Component Value Units Date/Time    CT Angiogram Chest [[id number]] Resulted: 07/27/23 1354     Updated: 07/27/23 1419    CT Abdomen Pelvis Without Contrast [[id number]] Collected: 07/27/23 1320     Updated: 07/27/23 1323    Narrative:      EXAM:    CT Abdomen and Pelvis Without Intravenous Contrast     EXAM DATE:    7/27/2023 11:50 AM     CLINICAL HISTORY:    Abdominal pain, acute, nonlocalized     TECHNIQUE:    Axial computed tomography images of the abdomen and pelvis without  intravenous contrast.  Sagittal and coronal reformatted images were  created and reviewed.  This CT exam was performed using one or more of  the following dose reduction techniques:  automated exposure control,  adjustment of the mA and/or kV according to patient size, and/or use of  iterative reconstruction technique.     COMPARISON:    No relevant prior studies available.     FINDINGS:    LUNG BASES:  Unremarkable.  No mass.  No consolidation.      ABDOMEN:    LIVER:  Unremarkable.    GALLBLADDER AND BILE DUCTS:  Unremarkable.  No calcified stones.  No  ductal dilation.    PANCREAS:  Calcification of the pancreas suggestive of chronic  pancreatitis.  No ductal dilation.    SPLEEN:  Unremarkable.  No splenomegaly.    ADRENALS:  Unremarkable.  No mass.    KIDNEYS AND URETERS:  Unremarkable.  No obstructing stones.  No  hydronephrosis.    STOMACH AND BOWEL:  Scattered fluid filled small bowel loops noted.   No obstruction.  No mucosal thickening.      PELVIS:    APPENDIX:  No findings to suggest acute appendicitis.    BLADDER:  Bladder wall thickening which may be due to the decompressed  state of the bladder  or due to cystitis.  No stones.    REPRODUCTIVE:  Unremarkable as visualized.      ABDOMEN and PELVIS:    INTRAPERITONEAL SPACE:  Unremarkable.  No free air.  No significant  fluid collection.    BONES/JOINTS:  No acute fracture.  No dislocation.    SOFT TISSUES:  Unremarkable.    VASCULATURE:  Unremarkable.  No abdominal aortic aneurysm.    LYMPH NODES:  Unremarkable.  No enlarged lymph nodes.       Impression:      1.  Bladder wall thickening which may be due to the decompressed state  of the bladder or due to cystitis.  2.  Calcification of the pancreas suggestive of chronic pancreatitis.     This report was finalized on 7/27/2023 1:21 PM by Dr. Yordy Jean-Baptiste MD.       XR Chest 1 View [[id number]] Collected: 07/27/23 1311     Updated: 07/27/23 1313    Narrative:      EXAM:    XR Chest, 1 View     EXAM DATE:    7/27/2023 11:24 AM     CLINICAL HISTORY:    Severe Sepsis Protocol     TECHNIQUE:    Frontal view of the chest.     COMPARISON:    No relevant prior studies available.     FINDINGS:    LUNGS AND PLEURAL SPACES:  Unremarkable.  No consolidation.  No  pneumothorax.    HEART:  Unremarkable.  No cardiomegaly.    MEDIASTINUM:  Unremarkable.    BONES/JOINTS:  Unremarkable.    VASCULATURE:  Ascending aortic prominence that may represent aortic  aneurysm.       Impression:        Ascending aortic prominence that may represent aortic aneurysm.     This report was finalized on 7/27/2023 1:11 PM by Dr. Yordy Jean-Baptiste MD.               Last echocardiogram: No previous echo on file.      I have personally reviewed the above radiology images and read the final radiology report on 07/27/23  ---------------------------------------------------------------------------------------------------------------------  Assessment / Plan     Active Hospital Problems    Diagnosis  POA    **Alcohol withdrawal [F10.939]  Yes       ASSESSMENT/PLAN:  -Acute EtOH withdrawal, present on admission  -Hypertensive urgency, present on admission,  likely due to above  -Anemia and thrombocytopenia, likely chronic due to alcohol abuse  -Transaminitis, present on admission  -Acute hypomagnesemia, present on admission  -SIRS criteria met on arrival with HR>90, Temp>100.9, RR>20, Lactate>2, and Procalcitonin elevation with unclear source of infection  -Indeterminate troponin elevation, present on admission  -CT of the abdomen/pelvis without contrast noted bladder wall thickening, which may be due to decompressed state of the bladder or due to cystitis. Calcification of the pancreas suggestive of chronic pancreatitis. Liver noted as unremarkable.  -Lipase within normal limits.  -/ALT 73. Alk phos 190. PT/INR and total bili within normal limits.   -Obtain acute hepatitis panel and HIV-1/HIV-2 testing.   -UDS and ethanol level negative.   -CIWA protocol with Ativan detox.   -Seizure and fall precautions ordered.   -Vitamin replacement initiated.   -Urinalysis was with 3+ blood, 3+ leukocytes, 2+ protein, 1+ bilirubin, TNTC RBCs, TNTC WBCs, and 3-6 squamous epithelial cells. Add culture.  -Obtain gonorrhea/chlamydia PCR.   -Lactate 2.3. Procal 0.39. Received sepsis fluid bolus in the ED.   -Continue LR infusion @ 125 ml/hr.   -Peripheral blood cultures x2 obtained, pending.  -Continue empiric treatment with Zosyn and Doxycycline for now pending further infectious workup/finalization of cultures.  -HS Troponin T 24 with -2 delta. EKG without evidence of acute ischemia.   -Denies chest pain on exam, however reports dull, left-sided chest pain on presentation.   -Continuous cardiac monitoring and pulse oximetry ordered.   -Obtain HgbA1c and lipid profile for risk stratification.   -Loading dose of aspirin now. ASA/statin daily.  -Magnesium 1.2. Electrolyte replacement protocol added.   -QT interval mildly prolonged at 484 ms. Avoid QT prolonging agents as able.  -Monitor VS per hospital policy. BP remains moderately elevated with systolic 140-160s/diastolic 100s.  Received 0.1 mg oral clonidine in the ED.   -PRN IV labetalol for SBP > 180 if necessary.   -Repeat CBC and CMP in the a.m.   -Case management consult for assistance with discharge planning.           ----------  -DVT prophylaxis: Lovenox.  -Activity: As tolerated. Up with assistance. Fall precautions.   -Expected length of stay:   INPATIENT status due to the need for care which can only be reasonably provided in an hospital setting such as aggressive/expedited ancillary services and/or consultation services, the necessity for IV medications, close physician monitoring and/or the possible need for procedures.  In such, I feel patient's risk for adverse outcomes and need for care warrant INPATIENT evaluation and predict the patient's care encounter to likely last beyond 2 midnights.   -Disposition pending clinical course.    High risk secondary to acute EtOH withdrawal, hypertensive urgency, anemia, thrombocytopenia, transaminitis, acute hypomagnesemia, indeterminate troponin elevation, and positive SIRS criteria without clear source of infection.       CODE STATUS: FULL CODE      YISSEL Rangel   07/27/23  14:23 EDT  Pager #[phone]  ---------------------------------------------------------------------------------------------------------------------

## 2023-07-27 NOTE — CASE MANAGEMENT/SOCIAL WORK
Discharge Planning Assessment   Jayesh     Patient Name: Edvin Jiménez  MRN: [id number]  Today's Date: 7/27/2023    Admit Date: 7/27/2023    Plan: Spoke with patient at bedside. Patient is in rehab at [facility]. Patient has no friends or family he wants listed for emergency contact. No PCP. No pharmacy he wants listed. No POA/Living Will. Patient stated he will return to [facility] and would need transportation back.   Discharge Needs Assessment       Row Name 07/27/23 1432       Living Environment    People in Home facility resident    Potentially Unsafe Housing Conditions none    Primary Care Provided by self    Provides Primary Care For no one    Family Caregiver if Needed none    Quality of Family Relationships non-existent    Able to Return to Prior Arrangements yes       Resource/Environmental Concerns    Resource/Environmental Concerns none    Transportation Concerns none       Food Insecurity    Within the past 12 months, you worried that your food would run out before you got the money to buy more. Never true    Within the past 12 months, the food you bought just didn't last and you didn't have money to get more. Never true       Transition Planning    Patient/Family Anticipates Transition to inpatient rehabilitation facility    Patient/Family Anticipated Services at Transition none    Transportation Anticipated public transportation       Discharge Needs Assessment    Readmission Within the Last 30 Days no previous admission in last 30 days    Equipment Currently Used at Home none    Concerns to be Addressed discharge planning    Anticipated Changes Related to Illness none    Equipment Needed After Discharge none                   Discharge Plan       Row Name 07/27/23 1439       Plan    Plan Spoke with patient at bedside. Patient is in rehab at [facility]. Patient has no friends or family he wants listed for emergency contact. No PCP. No pharmacy he wants listed. No POA/Living Will.  Patient stated he will return to [facility] and would need transportation back.    Patient/Family in Agreement with Plan yes      Row Name 07/27/23 3573       Plan    Plan Spoke with patient at bedside. Patient is in rehab at [facility], Patient stated he has no emergency contacts no friends or family he wants listed. No HH/DME/O2. No PCP.                  Continued Care and Services - Admitted Since 7/27/2023    Coordination has not been started for this encounter.          Demographic Summary       Row Name 07/27/23 1438       General Information    Admission Type inpatient    Arrived From other (see comments)  [facility]    Referral Source emergency department    Reason for Consult discharge planning    Preferred Language English               Tiny Vieira

## 2023-07-27 NOTE — CASE MANAGEMENT/SOCIAL WORK
Case Management/Social Work    Patient Name:  Edvin Jiménez  YOB: 1970  MRN: [id number]  Admit Date:  7/27/2023      Contacted [facility] [phone] spoke with Pollo patient is not a resident at their facility and has never has had any contact with them. Contacted [facility] [phone] spoke with Tiffanie patient is not a resident in their facility and has never had any contact with them either.      Electronically signed by:  Tiny Vieira  07/27/23 19:26 EDT

## 2023-07-28 ENCOUNTER — APPOINTMENT (OUTPATIENT)
Dept: CARDIOLOGY | Facility: HOSPITAL | Age: 53
DRG: 872 | End: 2023-07-28

## 2023-07-28 ENCOUNTER — APPOINTMENT (OUTPATIENT)
Dept: ULTRASOUND IMAGING | Facility: HOSPITAL | Age: 53
DRG: 872 | End: 2023-07-28

## 2023-07-28 LAB
ANION GAP SERPL CALCULATED.3IONS-SCNC: 13.1 MMOL/L (ref 5–15)
BACTERIA BLD CULT: ABNORMAL
BACTERIA ID TEST ISLT QL CULT: ABNORMAL
BASOPHILS # BLD AUTO: 0.04 10*3/MM3 (ref 0–0.2)
BASOPHILS NFR BLD AUTO: 0.6 % (ref 0–1.5)
BOTTLE TYPE: ABNORMAL
BUN SERPL-MCNC: 10 MG/DL (ref 6–20)
BUN/CREAT SERPL: 9.9 (ref 7–25)
CALCIUM SPEC-SCNC: 7.8 MG/DL (ref 8.6–10.5)
CHLORIDE SERPL-SCNC: 96 MMOL/L (ref 98–107)
CO2 SERPL-SCNC: 23.9 MMOL/L (ref 22–29)
CREAT SERPL-MCNC: 1.01 MG/DL (ref 0.76–1.27)
CREAT UR-MCNC: 47.4 MG/DL
DEPRECATED RDW RBC AUTO: 56.1 FL (ref 37–54)
EGFRCR SERPLBLD CKD-EPI 2021: 89.5 ML/MIN/1.73
EOSINOPHIL # BLD AUTO: 0.01 10*3/MM3 (ref 0–0.4)
EOSINOPHIL NFR BLD AUTO: 0.1 % (ref 0.3–6.2)
ERYTHROCYTE [DISTWIDTH] IN BLOOD BY AUTOMATED COUNT: 19.3 % (ref 12.3–15.4)
FERRITIN SERPL-MCNC: 523.9 NG/ML (ref 30–400)
GLUCOSE SERPL-MCNC: 173 MG/DL (ref 65–99)
HCT VFR BLD AUTO: 30.6 % (ref 37.5–51)
HGB BLD-MCNC: 9.5 G/DL (ref 13–17.7)
IMM GRANULOCYTES # BLD AUTO: 0.05 10*3/MM3 (ref 0–0.05)
IMM GRANULOCYTES NFR BLD AUTO: 0.7 % (ref 0–0.5)
IRON 24H UR-MRATE: 26 MCG/DL (ref 59–158)
IRON SATN MFR SERPL: 10 % (ref 20–50)
LYMPHOCYTES # BLD AUTO: 0.62 10*3/MM3 (ref 0.7–3.1)
LYMPHOCYTES NFR BLD AUTO: 8.7 % (ref 19.6–45.3)
MAGNESIUM SERPL-MCNC: 2.5 MG/DL (ref 1.6–2.6)
MCH RBC QN AUTO: 25.2 PG (ref 26.6–33)
MCHC RBC AUTO-ENTMCNC: 31 G/DL (ref 31.5–35.7)
MCV RBC AUTO: 81.2 FL (ref 79–97)
MONOCYTES # BLD AUTO: 0.52 10*3/MM3 (ref 0.1–0.9)
MONOCYTES NFR BLD AUTO: 7.3 % (ref 5–12)
NEUTROPHILS NFR BLD AUTO: 5.88 10*3/MM3 (ref 1.7–7)
NEUTROPHILS NFR BLD AUTO: 82.6 % (ref 42.7–76)
NRBC BLD AUTO-RTO: 0.3 /100 WBC (ref 0–0.2)
PHOSPHATE SERPL-MCNC: 4 MG/DL (ref 2.5–4.5)
PLATELET # BLD AUTO: 97 10*3/MM3 (ref 140–450)
PMV BLD AUTO: 10.9 FL (ref 6–12)
POTASSIUM SERPL-SCNC: 3.7 MMOL/L (ref 3.5–5.2)
PROT ?TM UR-MCNC: 68.7 MG/DL
PROT/CREAT UR: 1449.4 MG/G CREA (ref 0–200)
QT INTERVAL: 392 MS
QTC INTERVAL: 484 MS
RBC # BLD AUTO: 3.77 10*6/MM3 (ref 4.14–5.8)
SODIUM SERPL-SCNC: 133 MMOL/L (ref 136–145)
TIBC SERPL-MCNC: 273 MCG/DL (ref 298–536)
TRANSFERRIN SERPL-MCNC: 183 MG/DL (ref 200–360)
WBC NRBC COR # BLD: 7.12 10*3/MM3 (ref 3.4–10.8)

## 2023-07-28 PROCEDURE — 85025 COMPLETE CBC W/AUTO DIFF WBC: CPT | Performed by: STUDENT IN AN ORGANIZED HEALTH CARE EDUCATION/TRAINING PROGRAM

## 2023-07-28 PROCEDURE — 99254 IP/OBS CNSLTJ NEW/EST MOD 60: CPT | Performed by: NURSE PRACTITIONER

## 2023-07-28 PROCEDURE — 83540 ASSAY OF IRON: CPT | Performed by: STUDENT IN AN ORGANIZED HEALTH CARE EDUCATION/TRAINING PROGRAM

## 2023-07-28 PROCEDURE — 93306 TTE W/DOPPLER COMPLETE: CPT | Performed by: INTERNAL MEDICINE

## 2023-07-28 PROCEDURE — 97162 PT EVAL MOD COMPLEX 30 MIN: CPT

## 2023-07-28 PROCEDURE — 80048 BASIC METABOLIC PNL TOTAL CA: CPT | Performed by: STUDENT IN AN ORGANIZED HEALTH CARE EDUCATION/TRAINING PROGRAM

## 2023-07-28 PROCEDURE — 76705 ECHO EXAM OF ABDOMEN: CPT

## 2023-07-28 PROCEDURE — 84100 ASSAY OF PHOSPHORUS: CPT | Performed by: STUDENT IN AN ORGANIZED HEALTH CARE EDUCATION/TRAINING PROGRAM

## 2023-07-28 PROCEDURE — 93306 TTE W/DOPPLER COMPLETE: CPT

## 2023-07-28 PROCEDURE — 94761 N-INVAS EAR/PLS OXIMETRY MLT: CPT

## 2023-07-28 PROCEDURE — 94799 UNLISTED PULMONARY SVC/PX: CPT

## 2023-07-28 PROCEDURE — 76705 ECHO EXAM OF ABDOMEN: CPT | Performed by: RADIOLOGY

## 2023-07-28 PROCEDURE — 83735 ASSAY OF MAGNESIUM: CPT | Performed by: STUDENT IN AN ORGANIZED HEALTH CARE EDUCATION/TRAINING PROGRAM

## 2023-07-28 PROCEDURE — 97166 OT EVAL MOD COMPLEX 45 MIN: CPT

## 2023-07-28 PROCEDURE — 99291 CRITICAL CARE FIRST HOUR: CPT | Performed by: STUDENT IN AN ORGANIZED HEALTH CARE EDUCATION/TRAINING PROGRAM

## 2023-07-28 PROCEDURE — 25010000002 ERTAPENEM PER 500 MG: Performed by: INTERNAL MEDICINE

## 2023-07-28 PROCEDURE — 25010000002 PIPERACILLIN SOD-TAZOBACTAM PER 1 G: Performed by: STUDENT IN AN ORGANIZED HEALTH CARE EDUCATION/TRAINING PROGRAM

## 2023-07-28 PROCEDURE — 25010000002 LORAZEPAM PER 2 MG: Performed by: STUDENT IN AN ORGANIZED HEALTH CARE EDUCATION/TRAINING PROGRAM

## 2023-07-28 PROCEDURE — 25010000002 KETOROLAC TROMETHAMINE PER 15 MG: Performed by: STUDENT IN AN ORGANIZED HEALTH CARE EDUCATION/TRAINING PROGRAM

## 2023-07-28 PROCEDURE — 87040 BLOOD CULTURE FOR BACTERIA: CPT | Performed by: NURSE PRACTITIONER

## 2023-07-28 PROCEDURE — 25010000002 LABETALOL 5 MG/ML SOLUTION: Performed by: STUDENT IN AN ORGANIZED HEALTH CARE EDUCATION/TRAINING PROGRAM

## 2023-07-28 PROCEDURE — 84466 ASSAY OF TRANSFERRIN: CPT | Performed by: STUDENT IN AN ORGANIZED HEALTH CARE EDUCATION/TRAINING PROGRAM

## 2023-07-28 PROCEDURE — 25010000002 THIAMINE PER 100 MG: Performed by: STUDENT IN AN ORGANIZED HEALTH CARE EDUCATION/TRAINING PROGRAM

## 2023-07-28 PROCEDURE — 82728 ASSAY OF FERRITIN: CPT | Performed by: STUDENT IN AN ORGANIZED HEALTH CARE EDUCATION/TRAINING PROGRAM

## 2023-07-28 RX ORDER — CLONIDINE HYDROCHLORIDE 0.1 MG/1
0.1 TABLET ORAL ONCE
Status: COMPLETED | OUTPATIENT
Start: 2023-07-28 | End: 2023-07-28

## 2023-07-28 RX ORDER — LOSARTAN POTASSIUM 25 MG/1
25 TABLET ORAL
Status: DISCONTINUED | OUTPATIENT
Start: 2023-07-28 | End: 2023-07-29

## 2023-07-28 RX ORDER — SODIUM CHLORIDE 0.9 % (FLUSH) 0.9 %
10 SYRINGE (ML) INJECTION EVERY 12 HOURS SCHEDULED
Status: DISCONTINUED | OUTPATIENT
Start: 2023-07-28 | End: 2023-08-10 | Stop reason: HOSPADM

## 2023-07-28 RX ORDER — CARVEDILOL 6.25 MG/1
6.25 TABLET ORAL 2 TIMES DAILY WITH MEALS
Status: DISCONTINUED | OUTPATIENT
Start: 2023-07-28 | End: 2023-08-10 | Stop reason: HOSPADM

## 2023-07-28 RX ORDER — SODIUM CHLORIDE 9 MG/ML
40 INJECTION, SOLUTION INTRAVENOUS AS NEEDED
Status: DISCONTINUED | OUTPATIENT
Start: 2023-07-28 | End: 2023-08-10 | Stop reason: HOSPADM

## 2023-07-28 RX ORDER — SODIUM CHLORIDE 0.9 % (FLUSH) 0.9 %
10 SYRINGE (ML) INJECTION AS NEEDED
Status: DISCONTINUED | OUTPATIENT
Start: 2023-07-28 | End: 2023-08-10 | Stop reason: HOSPADM

## 2023-07-28 RX ORDER — HYDRALAZINE HYDROCHLORIDE 10 MG/1
10 TABLET, FILM COATED ORAL 3 TIMES DAILY PRN
Status: DISCONTINUED | OUTPATIENT
Start: 2023-07-28 | End: 2023-07-29

## 2023-07-28 RX ADMIN — ACETAMINOPHEN 650 MG: 325 TABLET ORAL at 20:39

## 2023-07-28 RX ADMIN — LORAZEPAM 2 MG: 2 INJECTION INTRAMUSCULAR; INTRAVENOUS at 13:54

## 2023-07-28 RX ADMIN — ASPIRIN 81 MG: 81 TABLET, CHEWABLE ORAL at 08:07

## 2023-07-28 RX ADMIN — THIAMINE HYDROCHLORIDE 200 MG: 100 INJECTION, SOLUTION INTRAMUSCULAR; INTRAVENOUS at 21:08

## 2023-07-28 RX ADMIN — SODIUM CHLORIDE, POTASSIUM CHLORIDE, SODIUM LACTATE AND CALCIUM CHLORIDE 125 ML/HR: 600; 310; 30; 20 INJECTION, SOLUTION INTRAVENOUS at 02:50

## 2023-07-28 RX ADMIN — THIAMINE HYDROCHLORIDE 200 MG: 100 INJECTION, SOLUTION INTRAMUSCULAR; INTRAVENOUS at 06:20

## 2023-07-28 RX ADMIN — Medication 10 ML: at 04:22

## 2023-07-28 RX ADMIN — LORAZEPAM 2 MG: 2 INJECTION INTRAMUSCULAR; INTRAVENOUS at 11:16

## 2023-07-28 RX ADMIN — LORAZEPAM 2 MG: 2 INJECTION INTRAMUSCULAR; INTRAVENOUS at 06:20

## 2023-07-28 RX ADMIN — Medication 10 ML: at 21:07

## 2023-07-28 RX ADMIN — THIAMINE HYDROCHLORIDE 200 MG: 100 INJECTION, SOLUTION INTRAMUSCULAR; INTRAVENOUS at 14:17

## 2023-07-28 RX ADMIN — ATORVASTATIN CALCIUM 40 MG: 40 TABLET, FILM COATED ORAL at 21:07

## 2023-07-28 RX ADMIN — Medication 1 TABLET: at 08:07

## 2023-07-28 RX ADMIN — Medication 10 ML: at 08:09

## 2023-07-28 RX ADMIN — KETOROLAC TROMETHAMINE 30 MG: 30 INJECTION, SOLUTION INTRAMUSCULAR; INTRAVENOUS at 19:36

## 2023-07-28 RX ADMIN — LOSARTAN POTASSIUM 25 MG: 25 TABLET, FILM COATED ORAL at 18:10

## 2023-07-28 RX ADMIN — Medication 10 ML: at 08:08

## 2023-07-28 RX ADMIN — SODIUM CHLORIDE, POTASSIUM CHLORIDE, SODIUM LACTATE AND CALCIUM CHLORIDE 125 ML/HR: 600; 310; 30; 20 INJECTION, SOLUTION INTRAVENOUS at 22:15

## 2023-07-28 RX ADMIN — LABETALOL HYDROCHLORIDE 10 MG: 5 INJECTION, SOLUTION INTRAVENOUS at 14:17

## 2023-07-28 RX ADMIN — HYDRALAZINE HYDROCHLORIDE 10 MG: 10 TABLET, FILM COATED ORAL at 16:56

## 2023-07-28 RX ADMIN — DOCUSATE SODIUM 50 MG AND SENNOSIDES 8.6 MG 2 TABLET: 8.6; 5 TABLET, FILM COATED ORAL at 08:07

## 2023-07-28 RX ADMIN — LORAZEPAM 2 MG: 2 INJECTION INTRAMUSCULAR; INTRAVENOUS at 22:15

## 2023-07-28 RX ADMIN — CLONIDINE HYDROCHLORIDE 0.1 MG: 0.1 TABLET ORAL at 04:12

## 2023-07-28 RX ADMIN — CARVEDILOL 6.25 MG: 6.25 TABLET, FILM COATED ORAL at 18:10

## 2023-07-28 RX ADMIN — LORAZEPAM 2 MG: 2 INJECTION INTRAMUSCULAR; INTRAVENOUS at 20:21

## 2023-07-28 RX ADMIN — CARVEDILOL 3.12 MG: 3.12 TABLET, FILM COATED ORAL at 08:07

## 2023-07-28 RX ADMIN — LORAZEPAM 2 MG: 2 INJECTION INTRAMUSCULAR; INTRAVENOUS at 00:17

## 2023-07-28 RX ADMIN — LORAZEPAM 2 MG: 2 INJECTION INTRAMUSCULAR; INTRAVENOUS at 20:39

## 2023-07-28 RX ADMIN — SODIUM CHLORIDE, POTASSIUM CHLORIDE, SODIUM LACTATE AND CALCIUM CHLORIDE 125 ML/HR: 600; 310; 30; 20 INJECTION, SOLUTION INTRAVENOUS at 11:06

## 2023-07-28 RX ADMIN — LABETALOL HYDROCHLORIDE 10 MG: 5 INJECTION, SOLUTION INTRAVENOUS at 06:24

## 2023-07-28 RX ADMIN — LORAZEPAM 1 MG: 2 INJECTION INTRAMUSCULAR; INTRAVENOUS at 18:10

## 2023-07-28 RX ADMIN — DOXYCYCLINE 100 MG: 100 INJECTION, POWDER, LYOPHILIZED, FOR SOLUTION INTRAVENOUS at 06:20

## 2023-07-28 RX ADMIN — FOLIC ACID 1 MG: 5 INJECTION, SOLUTION INTRAMUSCULAR; INTRAVENOUS; SUBCUTANEOUS at 08:07

## 2023-07-28 RX ADMIN — THIAMINE HYDROCHLORIDE 200 MG: 100 INJECTION, SOLUTION INTRAMUSCULAR; INTRAVENOUS at 00:17

## 2023-07-28 RX ADMIN — LORAZEPAM 2 MG: 2 INJECTION INTRAMUSCULAR; INTRAVENOUS at 21:06

## 2023-07-28 RX ADMIN — PIPERACILLIN SODIUM AND TAZOBACTAM SODIUM 4.5 G: 4; .5 INJECTION, POWDER, LYOPHILIZED, FOR SOLUTION INTRAVENOUS at 04:12

## 2023-07-28 RX ADMIN — ERTAPENEM SODIUM 1000 MG: 1 INJECTION INTRAMUSCULAR; INTRAVENOUS at 08:07

## 2023-07-28 NOTE — CASE MANAGEMENT/SOCIAL WORK
Discharge Planning Assessment   Jayesh     Patient Name: Edvin Jiménez  MRN: [id number]  Today's Date: 7/28/2023    Admit Date: 7/27/2023       Discharge Plan       Row Name 07/28/23 1559       Plan    Plan SS received consult for homeless. SS spoke with pt at bedside on this date. 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 SS contacted [facility] [phone] who states they do not have anyone by that name at their facility. Pt states he does not utilize HH or DME. Pt does not have a PCP at this time. Pt states he has family but does not have no contact information for them. SS to follow and assist with discharge planning.               TRAVIS Jauregui

## 2023-07-28 NOTE — PROGRESS NOTES
[facility] HOSPITALIST PROGRESS NOTE     Patient Identification:  Name:  Edvin Jiménez  Age:  52 y.o.  Sex:  male  :  1970  MRN:  [id number]  Visit Number:  [id number]  ROOM: [address]     Primary Care Provider:  Provider, No Known    Length of stay in inpatient status:  1    Subjective     Chief Compliant:    Chief Complaint   Patient presents with    Alcohol Problem       History of Presenting Illness:    Patient seen in follow-up for alcohol withdrawal and new ESBL E. coli bacteremia.  Patient is awake, alert and oriented x4 this morning.  He has no active signs of withdrawal and says he feels better.  Has remained afebrile.  No adverse events noted overnight.    Objective     Current Hospital Meds:aspirin, 81 mg, Oral, Daily  atorvastatin, 40 mg, Oral, Nightly  carvedilol, 3.125 mg, Oral, BID With Meals  enoxaparin, 40 mg, Subcutaneous, Nightly  ertapenem, 1,000 mg, Intravenous, Q24H  folic acid (FOLVITE) IVPB, 1 mg, Intravenous, Daily  LORazepam, 2 mg, Intravenous, Q6H   Followed by  LORazepam, 1 mg, Intravenous, Q6H  multivitamin with minerals, 1 tablet, Oral, Daily  senna-docusate sodium, 2 tablet, Oral, BID  sodium chloride, 10 mL, Intravenous, Q12H  sodium chloride, 10 mL, Intravenous, Q12H  thiamine (B-1) IV, 200 mg, Intravenous, Q8H   Followed by  [START ON 2023] thiamine, 100 mg, Oral, Daily    dexmedetomidine, 0.2-1.5 mcg/kg/hr, Last Rate: Stopped (23)  lactated ringers, 125 mL/hr, Last Rate: 125 mL/hr (23 1106)        Current Antimicrobial Therapy:  Anti-Infectives (From admission, onward)      Ordered     Dose/Rate Route Frequency Start Stop    23 0444  ertapenem (INVanz) 1,000 mg in sodium chloride 0.9 % 100 mL IVPB-VTB        Ordering Provider: Liyah Manriquez APRN    1,000 mg  200 mL/hr over 30 Minutes Intravenous Every 24 Hours 23 0800 23 0759    23 1146  piperacillin-tazobactam (ZOSYN) IVPB 3.375 g in 100 mL NS VTB         Ordering Provider: Colby Lam DO    3.375 g  over 30 Minutes Intravenous Once 07/27/23 1202 07/27/23 1330          Current Diuretic Therapy:  Diuretics (From admission, onward)      None          ----------------------------------------------------------------------------------------------------------------------  Vital Signs:  Temp:  [98.7 øF (37.1 øC)-101 øF (38.3 øC)] 99.4 øF (37.4 øC)  Heart Rate:  [63-98] 77  Resp:  [14-20] 18  BP: (127-185)/() 179/127  SpO2:  [91 %-100 %] 99 %  on   ;   Device (Oxygen Therapy): room air  Body mass index is 20.41 kg/mý.    Wt Readings from Last 3 Encounters:   07/28/23 62.7 kg (138 lb 3.7 oz)     Intake & Output (last 3 days)         07/25 0701 07/26 0700 07/26 0701 07/27 0700 07/27 0701 07/28 0700 07/28 0701 07/29 0700    P.O.    354    I.V. (mL/kg)   1475 (23.5)     IV Piggyback   2998 50    Total Intake(mL/kg)   4473 (71.3) 404 (6.4)    Urine (mL/kg/hr)   1850     Stool   0     Total Output   1850     Net   +2623 +404            Stool Unmeasured Occurrence   1 x           Diet: Regular/House Diet; Texture: Regular Texture (IDDSI 7); Fluid Consistency: Thin (IDDSI 0)  ----------------------------------------------------------------------------------------------------------------------  Physical exam:  Constitutional: Older -American male, nontoxic, disheveled appearing, Well-developed and well-nourished, resting comfortably in bed, no acute distress.      HENT:  Head:  Normocephalic and atraumatic.  Mouth:  Moist mucous membranes.    Eyes:  Conjunctivae and EOM are normal. No scleral icterus.   Cardiovascular:  Normal rate, regular rhythm and normal heart sounds with no murmur. No JVD.   Pulmonary/Chest:  No respiratory distress, no wheezes, no crackles, with normal breath sounds and good air movement. Unlabored. No accessory muscle use.  Abdominal:  Soft. No distension and no tenderness.  Bowel sounds present. No rebound or guarding.    Musculoskeletal:  No tenderness, and no deformity.  No red or swollen joints anywhere.    Neurological:  Alert and oriented to person, place, and time.  No cranial nerve deficit.   Nonfocal.   Skin:  Skin is warm and dry. No rash noted. No pallor.   Peripheral vascular:  No clubbing, no cyanosis, no edema. Pedal and tibial pulses 2 out of 4 bilaterally.     ----------------------------------------------------------------------------------------------------------------------  Results from last 7 days   Lab Units 07/28/23 0004 07/27/23  1852 07/27/23  1530 07/27/23  1236 07/27/23  1108   LACTATE mmol/L  --  1.6 3.0* 2.3*  --    WBC 10*3/mm3 7.12  --   --   --  6.68   HEMOGLOBIN g/dL 9.5*  --   --   --  10.1*   HEMATOCRIT % 30.6*  --   --   --  31.3*   MCV fL 81.2  --   --   --  78.3*   MCHC g/dL 31.0*  --   --   --  32.3   PLATELETS 10*3/mm3 97*  --   --   --  128*   INR   --   --   --  1.02  --          Results from last 7 days   Lab Units 07/28/23 0004 07/27/23  1852 07/27/23  1108   SODIUM mmol/L 133*  --  133*   POTASSIUM mmol/L 3.7  --  4.0   MAGNESIUM mg/dL 2.5 1.4* 1.2*   CHLORIDE mmol/L 96*  --  92*   CO2 mmol/L 23.9  --  29.0   BUN mg/dL 10  --  8   CREATININE mg/dL 1.01  --  0.87   CALCIUM mg/dL 7.8*  --  8.7   PHOSPHORUS mg/dL 4.0 4.4  --    GLUCOSE mg/dL 173*  --  128*   ALBUMIN g/dL  --   --  3.6   BILIRUBIN mg/dL  --   --  0.8   ALK PHOS U/L  --   --  190*   AST (SGOT) U/L  --   --  190*   ALT (SGPT) U/L  --   --  73*   Estimated Creatinine Clearance: 75.9 mL/min (by C-G formula based on SCr of 1.01 mg/dL).  No results found for: AMMONIA  Results from last 7 days   Lab Units 07/27/23  1236 07/27/23  1108   HSTROP T ng/L 22* 24*         Results from last 7 days   Lab Units 07/27/23  1236   CHOLESTEROL mg/dL 138   TRIGLYCERIDES mg/dL 53   HDL CHOL mg/dL 110*   LDL CHOL mg/dL 16     Hemoglobin A1C   Date/Time Value Ref Range Status   07/27/2023 1108 5.60 4.80 - 5.60 % Final     Lab Results   Component  Value Date    TSH 1.120 07/27/2023     No results found for: PREGTESTUR, PREGSERUM, HCG, HCGQUANT  Pain Management Panel          Latest Ref Rng & Units 7/27/2023   Pain Management Panel   Creatinine, Urine mg/dL 47.4    Amphetamine, Urine Qual Negative Negative    Barbiturates Screen, Urine Negative Negative    Benzodiazepine Screen, Urine Negative Negative    Buprenorphine, Screen, Urine Negative Negative    Cocaine Screen, Urine Negative Negative    Fentanyl, Urine Negative Negative    Methadone Screen , Urine Negative Negative    Methamphetamine, Ur Negative Negative      Brief Urine Lab Results  (Last result in the past 365 days)        Color   Clarity   Blood   Leuk Est   Nitrite   Protein   CREAT   Urine HCG        07/27/23 1124             47.4         07/27/23 1124 Red  Comment: Dipstick results may be inaccurate due to color interference.       Turbid   Large (3+)   Large (3+)   Negative   100 mg/dL (2+)                 Blood Culture   Date Value Ref Range Status   07/27/2023 Abnormal Stain (C)  Preliminary   07/27/2023 Abnormal Stain (C)  Preliminary     Urine Culture   Date Value Ref Range Status   07/27/2023 >100,000 CFU/mL Gram Negative Bacilli (A)  Preliminary     No results found for: WOUNDCX  No results found for: STOOLCX  No results found for: RESPCX  No results found for: AFBCX  Results from last 7 days   Lab Units 07/27/23  1852 07/27/23  1530 07/27/23  1236   PROCALCITONIN ng/mL  --   --  0.39*   LACTATE mmol/L 1.6 3.0* 2.3*       I have personally looked at the labs and they are summarized above.  ----------------------------------------------------------------------------------------------------------------------  Detailed radiology reports for the last 24 hours:  Imaging Results (Last 24 Hours)       Procedure Component Value Units Date/Time    CT Angiogram Chest [[id number]] Collected: 07/27/23 1434     Updated: 07/27/23 1438    Narrative:      EXAM:    CT Angiography Chest With Intravenous  Contrast     EXAM DATE:    7/27/2023 1:53 PM     CLINICAL HISTORY:    Aortic aneurysm, known or suspected     TECHNIQUE:    Axial computed tomographic angiography images of the chest with  intravenous contrast.  This CT exam was performed using one or more of  the following dose reduction techniques:  automated exposure control,  adjustment of the mA and/or kV according to patient size, and/or use of  iterative reconstruction technique.    MIP reconstructed images were created and reviewed.     COMPARISON:    No relevant prior studies available.     FINDINGS:    PULMONARY ARTERIES:  Unremarkable.  No pulmonary embolism.    AORTA:  Ascending aortic ectasia of 3.9 cm.  No thoracic aortic  aneurysm.    LUNGS AND PLEURAL SPACES:  Left lower lobe nodular groundglass  attenuation that should be followed up in 3 months.  No mass.  No  significant effusion.  No pneumothorax.    HEART:  Unremarkable.  No cardiomegaly.  No significant pericardial  effusion.  No evidence of RV dysfunction.    BONES/JOINTS:  Mild scoliosis noted.  No acute fracture.  No  dislocation.    SOFT TISSUES:  Unremarkable.    LYMPH NODES:  Unremarkable.  No enlarged lymph nodes.       Impression:      1.  Ascending aortic ectasia of 3.9 cm.  2.  Left lower lobe nodular groundglass attenuation that should be  followed up in 3 months.     This report was finalized on 7/27/2023 2:36 PM by Dr. Yordy Jean-Baptiste MD.       CT Abdomen Pelvis Without Contrast [[id number]] Collected: 07/27/23 1320     Updated: 07/27/23 1323    Narrative:      EXAM:    CT Abdomen and Pelvis Without Intravenous Contrast     EXAM DATE:    7/27/2023 11:50 AM     CLINICAL HISTORY:    Abdominal pain, acute, nonlocalized     TECHNIQUE:    Axial computed tomography images of the abdomen and pelvis without  intravenous contrast.  Sagittal and coronal reformatted images were  created and reviewed.  This CT exam was performed using one or more of  the following dose reduction techniques:   automated exposure control,  adjustment of the mA and/or kV according to patient size, and/or use of  iterative reconstruction technique.     COMPARISON:    No relevant prior studies available.     FINDINGS:    LUNG BASES:  Unremarkable.  No mass.  No consolidation.      ABDOMEN:    LIVER:  Unremarkable.    GALLBLADDER AND BILE DUCTS:  Unremarkable.  No calcified stones.  No  ductal dilation.    PANCREAS:  Calcification of the pancreas suggestive of chronic  pancreatitis.  No ductal dilation.    SPLEEN:  Unremarkable.  No splenomegaly.    ADRENALS:  Unremarkable.  No mass.    KIDNEYS AND URETERS:  Unremarkable.  No obstructing stones.  No  hydronephrosis.    STOMACH AND BOWEL:  Scattered fluid filled small bowel loops noted.   No obstruction.  No mucosal thickening.      PELVIS:    APPENDIX:  No findings to suggest acute appendicitis.    BLADDER:  Bladder wall thickening which may be due to the decompressed  state of the bladder or due to cystitis.  No stones.    REPRODUCTIVE:  Unremarkable as visualized.      ABDOMEN and PELVIS:    INTRAPERITONEAL SPACE:  Unremarkable.  No free air.  No significant  fluid collection.    BONES/JOINTS:  No acute fracture.  No dislocation.    SOFT TISSUES:  Unremarkable.    VASCULATURE:  Unremarkable.  No abdominal aortic aneurysm.    LYMPH NODES:  Unremarkable.  No enlarged lymph nodes.       Impression:      1.  Bladder wall thickening which may be due to the decompressed state  of the bladder or due to cystitis.  2.  Calcification of the pancreas suggestive of chronic pancreatitis.     This report was finalized on 7/27/2023 1:21 PM by Dr. Yordy Jean-Baptiste MD.       XR Chest 1 View [[id number]] Collected: 07/27/23 1311     Updated: 07/27/23 1313    Narrative:      EXAM:    XR Chest, 1 View     EXAM DATE:    7/27/2023 11:24 AM     CLINICAL HISTORY:    Severe Sepsis Protocol     TECHNIQUE:    Frontal view of the chest.     COMPARISON:    No relevant prior studies available.    "  FINDINGS:    LUNGS AND PLEURAL SPACES:  Unremarkable.  No consolidation.  No  pneumothorax.    HEART:  Unremarkable.  No cardiomegaly.    MEDIASTINUM:  Unremarkable.    BONES/JOINTS:  Unremarkable.    VASCULATURE:  Ascending aortic prominence that may represent aortic  aneurysm.       Impression:        Ascending aortic prominence that may represent aortic aneurysm.     This report was finalized on 7/27/2023 1:11 PM by Dr. Yordy Jean-Baptiste MD.             Assessment & Plan      Patient is a 52-year-old -American male with history significant for chronic alcohol abuse and hypertension who presented to the ER for alcohol detox.    #Acute alcohol withdrawal  #Elevated transaminases  --Patient presented to the ER for alcohol detox.  Patient stated he usually drinks \"6 quarts\" of Melquiades Beam or \"whatever I can get \". Started drinking heavily approximately 2 years ago.  Last drink on 7/26  --Admit serum alcohol negative  --Labs: AST//73, T. bili 0.8, alk phos 190  --CT w/out evidence of cirrhosis  --liver US ordered to r/o cirrhosis  --If patient becomes agitated, will initiate Precedex gtt., also can consider 1 mg/kg IV ketamine if needed  --Continue  cc an hour  --Continue CIWA w/ Ativan as ordered, high-dose thiamine followed by p.o., MV and p.o. folate  --Continue to monitor in CCU given high risk for withdrawal symptoms, approximately 2 to 3 days into withdrawal window    #Sepsis due to ESBL E. coli bacteremia  #Urinary tract infection due to GNB, POA  --Initial Tmax 101 in the ER, initially suspected to be withdrawal fever however cultures quickly grew out ESBL E. coli  --Admit labs: Lactic 2.3, Pro-Quang 0.4  --CT abdomen/pelvis noted cystitis  --UA with 3+ hematuria, 3+ leuks, +1 g protein but no bacteria?  --Urine culture GNB, suspect ESBL E. coli, not speciated yet  --Blood cultures 2/2 positive for ESBL E. coli  --Continue ertapenem  --Infectious disease following, appreciate " recommendations    #Microcytic anemia, suspect chronic  #Thrombocytopenia  --Hemoglobin 10.1, MCV 78, suspect chronic but no prior labs available.  Thrombocytopenia likely multifactorial due to alcohol abuse versus sepsis mediated due to ESBL bacteremia  --Check vitamin B12/folate, iron panel  --Repeat labs in a.m.    #Hypertensive urgency, POA  --Exacerbated by acute withdrawal as noted above  --Increase Coreg to 6.25 twice daily + losartan (proteinuria), as needed labetalol, will adjust p.o. antihypertensive regimen as needed    #NSTEMI, type 2  #Atypical chest pain  --due to the above. Patient c/0 of atypical chest pain on admission, asymptomatic since  --Initial troponin 24, repeat 22 with a -2 delta   --EKG noted NSR without acute ST or T wave changes  --Echocardiogram ordered to evaluate systolic and diastolic function   --Start beta-blocker, aspirin/statin, ARB    #Pulmonary nodule, needs outpatient follow-up in 3 months  #Proteinuria, protein creatinine ratio 1.5 g, will start losartan  #Ascending aortic ectasia 3.9 cm noted on CT angiogram, outpatient follow-up  #HCV antibody positive, RNA quant sent  #Chronic pancreatitis due to EtOH use, supportive care  #Hypovolemic hyponatremia, not clinically significant  #Hypomagnesemia, replace per protocol    CC 39    CHECKLIST:  Abx: Invanz  VTE: Lovenox  GI ppx: PPI  Diet: Consistent carb  Code: CPR, full  Dispo: Patient is high risk due to alcohol withdrawal and new ESBL E. coli bacteremia.  Suspect greater than 2 midnight stay.  Social work consulted given reports that patient is homeless for dispo planning.    Christian Chavez,   [facility] Hospitalist  07/28/23  11:49 EDT

## 2023-07-28 NOTE — PLAN OF CARE
Goal Outcome Evaluation:  Plan of Care Reviewed With: patient        Progress: no change  Outcome Evaluation: Pt is AO x3 on room air. CIWA protocol throughout shift. PT in contact precautions for ESBL and ID consulted. Loose BMs throughout shift. Afebrile during shift. Adequate UOP. Bloody urine and MD aware. Hypertensive during shift. See MAR. Call light in reach. WCTM

## 2023-07-28 NOTE — CONSULTS
INFECTIOUS DISEASE CONSULTATION REPORT        Patient Identification:  Name:  Edvin Jiménez  Age:  52 y.o.  Sex:  male  :  1970  MRN:  [id number]   Visit Number:  [id number]  Primary Care Physician:  Provider, No Known        Referring Provider: Dr. Chavez    Reason for consult: ESBL bacteremia       LOS: 1 day        Subjective       Subjective     History of present illness:      Thank you Dr. Chavez for allowing us to participate in the care of your patient.  As you well know, Mr. Edvin Jiménez is a 52 y.o. male with past medical history significant for alcohol abuse, who presented to [facility] Emergency Department on 2023 for chest pain and vomiting.  WBC 7.12.  Chlamydia and gonorrhea negative.  Urinalysis positive with too numerous to count WBCs, however contaminated specimen with too numerous to count RBCs, culture currently in progress.  COVID-19 PCR negative.  Calcitonin slightly elevated at 0.39.  Lactic acid elevated at 2.3 on admission.  Blood cultures from 2023 2 out of 2 sets positive for ESBL E. coli.  HIV negative.  Hepatitis C reactive.    Today patient is drowsy but awakens when spoken to.  Reports dysuria and left upper quadrant abdominal pain.  Reports having hematuria for several days.  Reports diarrhea.  Currently on room air with no apparent distress.  Lungs clear to auscultation bilaterally.  Abdomen soft, nondistended, left upper quadrant tenderness to palpation.      Infectious Disease consultation was requested for antimicrobial management.      ---------------------------------------------------------------------------------------------------------------------     Review Of Systems:    Constitutional: no fever, chills and night sweats. No appetite change or unexpected weight change. No fatigue.  Eyes: no eye drainage, itching or redness.  HEENT: no mouth sores, dysphagia or nose bleed.  Respiratory: no for shortness of breath, cough or  production of sputum.  Cardiovascular: no chest pain, no palpitations, no orthopnea.  Gastrointestinal: no nausea, vomiting or diarrhea. Positive LUQ abdominal pain, no hematemesis or rectal bleeding.  Genitourinary: no dysuria or polyuria.  Hematologic/lymphatic: no lymph node abnormalities, no easy bruising or easy bleeding.  Musculoskeletal: no muscle or joint pain.  Skin: No rash and no itching.  Neurological: no loss of consciousness, no seizure, no headache.  Behavioral/Psych: no depression or suicidal ideation.  Endocrine: no hot flashes.  Immunologic: negative.    ---------------------------------------------------------------------------------------------------------------------     Past Medical History    History reviewed. No pertinent past medical history.    Past Surgical History    History reviewed. No pertinent surgical history.    Family History    History reviewed. No pertinent family history.    Social History    Social History     Tobacco Use    Smoking status: Some Days     Types: Cigarettes     Passive exposure: Current    Smokeless tobacco: Former     Types: Chew   Vaping Use    Vaping Use: Never used   Substance Use Topics    Alcohol use: Not Currently     Comment: Pt reports drinking 2-3 pints per day    Drug use: Not Currently     Types: Marijuana       Allergies    Patient has no known allergies.  ---------------------------------------------------------------------------------------------------------------------     Home Medications:    Prior to Admission Medications       None          ---------------------------------------------------------------------------------------------------------------------    Objective       Objective     Hospital Scheduled Meds:  aspirin, 81 mg, Oral, Daily  atorvastatin, 40 mg, Oral, Nightly  carvedilol, 3.125 mg, Oral, BID With Meals  enoxaparin, 40 mg, Subcutaneous, Nightly  ertapenem, 1,000 mg, Intravenous, Q24H  folic acid (FOLVITE) IVPB, 1 mg,  Intravenous, Daily  LORazepam, 2 mg, Intravenous, Q6H   Followed by  LORazepam, 1 mg, Intravenous, Q6H  multivitamin with minerals, 1 tablet, Oral, Daily  senna-docusate sodium, 2 tablet, Oral, BID  sodium chloride, 10 mL, Intravenous, Q12H  sodium chloride, 10 mL, Intravenous, Q12H  thiamine (B-1) IV, 200 mg, Intravenous, Q8H   Followed by  [START ON 8/1/2023] thiamine, 100 mg, Oral, Daily      dexmedetomidine, 0.2-1.5 mcg/kg/hr, Last Rate: Stopped (07/27/23 2004)  lactated ringers, 125 mL/hr, Last Rate: 125 mL/hr (07/28/23 0250)      ---------------------------------------------------------------------------------------------------------------------   Vital Signs:  Temp:  [98.7 øF (37.1 øC)-101 øF (38.3 øC)] 99.4 øF (37.4 øC)  Heart Rate:  [63-98] 80  Resp:  [14-20] 18  BP: (127-185)/() 167/123  Mean Arterial Pressure (Non-Invasive) for the past 24 hrs (Last 3 readings):   Noninvasive MAP (mmHg)   07/28/23 0600 145   07/28/23 0500 135   07/28/23 0400 150     SpO2 Percentage    07/28/23 0500 07/28/23 0600 07/28/23 0658   SpO2: 98% 98% 97%     SpO2:  [79 %-100 %] 97 %  on   ;   Device (Oxygen Therapy): room air    Body mass index is 20.41 kg/mý.  Wt Readings from Last 3 Encounters:   07/28/23 62.7 kg (138 lb 3.7 oz)     ---------------------------------------------------------------------------------------------------------------------     Physical Exam:    Constitutional:  Well-developed and well-nourished, -American male currently on room air.  No respiratory distress.      HENT:  Head: Normocephalic and atraumatic.  Mouth:  Moist mucous membranes.    Eyes:  Conjunctivae and EOM are normal.  No scleral icterus.  Neck:  Neck supple.  No JVD present.    Cardiovascular:  Normal rate, regular rhythm and normal heart sounds with no murmur. No edema.  Pulmonary/Chest:  No respiratory distress, no wheezes, no crackles, with normal breath sounds and good air movement.  Abdominal:  Soft.  Bowel sounds are  normal.  No distension and LUQ tenderness.   Musculoskeletal:  No edema, no tenderness, and no deformity.  No swelling or redness of joints.  Neurological:  Alert and oriented to person, place, and time.  No facial droop.  No slurred speech.   Skin:  Skin is warm and dry.  No rash noted.  No pallor.   Psychiatric:  Normal mood and affect.  Behavior is normal.    ---------------------------------------------------------------------------------------------------------------------    Results from last 7 days   Lab Units 07/27/23  1236 07/27/23  1108   HSTROP T ng/L 22* 24*       Results from last 7 days   Lab Units 07/27/23  1236   CHOLESTEROL mg/dL 138   TRIGLYCERIDES mg/dL 53   HDL CHOL mg/dL 110*   LDL CHOL mg/dL 16       Results from last 7 days   Lab Units 07/28/23 0004 07/27/23 1852 07/27/23  1530 07/27/23  1236 07/27/23  1108   LACTATE mmol/L  --  1.6 3.0* 2.3*  --    WBC 10*3/mm3 7.12  --   --   --  6.68   HEMOGLOBIN g/dL 9.5*  --   --   --  10.1*   HEMATOCRIT % 30.6*  --   --   --  31.3*   MCV fL 81.2  --   --   --  78.3*   MCHC g/dL 31.0*  --   --   --  32.3   PLATELETS 10*3/mm3 97*  --   --   --  128*   INR   --   --   --  1.02  --      Results from last 7 days   Lab Units 07/28/23 0004 07/27/23 1852 07/27/23  1108   SODIUM mmol/L 133*  --  133*   POTASSIUM mmol/L 3.7  --  4.0   MAGNESIUM mg/dL 2.5 1.4* 1.2*   CHLORIDE mmol/L 96*  --  92*   CO2 mmol/L 23.9  --  29.0   BUN mg/dL 10  --  8   CREATININE mg/dL 1.01  --  0.87   CALCIUM mg/dL 7.8*  --  8.7   GLUCOSE mg/dL 173*  --  128*   ALBUMIN g/dL  --   --  3.6   BILIRUBIN mg/dL  --   --  0.8   ALK PHOS U/L  --   --  190*   AST (SGOT) U/L  --   --  190*   ALT (SGPT) U/L  --   --  73*   Estimated Creatinine Clearance: 75.9 mL/min (by C-G formula based on SCr of 1.01 mg/dL).  No results found for: AMMONIA    Hemoglobin A1C   Date/Time Value Ref Range Status   07/27/2023 1108 5.60 4.80 - 5.60 % Final     Lab Results   Component Value Date    HGBA1C 5.60  07/27/2023     Lab Results   Component Value Date    TSH 1.120 07/27/2023       Blood Culture   Date Value Ref Range Status   07/27/2023 Abnormal Stain (C)  Preliminary   07/27/2023 Abnormal Stain (C)  Preliminary     No results found for: URINECX  No results found for: WOUNDCX  No results found for: STOOLCX  No results found for: RESPCX  Pain Management Panel          Latest Ref Rng & Units 7/27/2023   Pain Management Panel   Creatinine, Urine mg/dL 47.4    Amphetamine, Urine Qual Negative Negative    Barbiturates Screen, Urine Negative Negative    Benzodiazepine Screen, Urine Negative Negative    Buprenorphine, Screen, Urine Negative Negative    Cocaine Screen, Urine Negative Negative    Fentanyl, Urine Negative Negative    Methadone Screen , Urine Negative Negative    Methamphetamine, Ur Negative Negative      I have personally reviewed the above laboratory results.   ---------------------------------------------------------------------------------------------------------------------  Imaging Results (Last 7 Days)       Procedure Component Value Units Date/Time    CT Angiogram Chest [[id number]] Collected: 07/27/23 1434     Updated: 07/27/23 1438    Narrative:      EXAM:    CT Angiography Chest With Intravenous Contrast     EXAM DATE:    7/27/2023 1:53 PM     CLINICAL HISTORY:    Aortic aneurysm, known or suspected     TECHNIQUE:    Axial computed tomographic angiography images of the chest with  intravenous contrast.  This CT exam was performed using one or more of  the following dose reduction techniques:  automated exposure control,  adjustment of the mA and/or kV according to patient size, and/or use of  iterative reconstruction technique.    MIP reconstructed images were created and reviewed.     COMPARISON:    No relevant prior studies available.     FINDINGS:    PULMONARY ARTERIES:  Unremarkable.  No pulmonary embolism.    AORTA:  Ascending aortic ectasia of 3.9 cm.  No thoracic aortic  aneurysm.    LUNGS  AND PLEURAL SPACES:  Left lower lobe nodular groundglass  attenuation that should be followed up in 3 months.  No mass.  No  significant effusion.  No pneumothorax.    HEART:  Unremarkable.  No cardiomegaly.  No significant pericardial  effusion.  No evidence of RV dysfunction.    BONES/JOINTS:  Mild scoliosis noted.  No acute fracture.  No  dislocation.    SOFT TISSUES:  Unremarkable.    LYMPH NODES:  Unremarkable.  No enlarged lymph nodes.       Impression:      1.  Ascending aortic ectasia of 3.9 cm.  2.  Left lower lobe nodular groundglass attenuation that should be  followed up in 3 months.     This report was finalized on 7/27/2023 2:36 PM by Dr. Yordy Jean-Baptiste MD.       CT Abdomen Pelvis Without Contrast [[id number]] Collected: 07/27/23 1320     Updated: 07/27/23 1323    Narrative:      EXAM:    CT Abdomen and Pelvis Without Intravenous Contrast     EXAM DATE:    7/27/2023 11:50 AM     CLINICAL HISTORY:    Abdominal pain, acute, nonlocalized     TECHNIQUE:    Axial computed tomography images of the abdomen and pelvis without  intravenous contrast.  Sagittal and coronal reformatted images were  created and reviewed.  This CT exam was performed using one or more of  the following dose reduction techniques:  automated exposure control,  adjustment of the mA and/or kV according to patient size, and/or use of  iterative reconstruction technique.     COMPARISON:    No relevant prior studies available.     FINDINGS:    LUNG BASES:  Unremarkable.  No mass.  No consolidation.      ABDOMEN:    LIVER:  Unremarkable.    GALLBLADDER AND BILE DUCTS:  Unremarkable.  No calcified stones.  No  ductal dilation.    PANCREAS:  Calcification of the pancreas suggestive of chronic  pancreatitis.  No ductal dilation.    SPLEEN:  Unremarkable.  No splenomegaly.    ADRENALS:  Unremarkable.  No mass.    KIDNEYS AND URETERS:  Unremarkable.  No obstructing stones.  No  hydronephrosis.    STOMACH AND BOWEL:  Scattered fluid filled small  bowel loops noted.   No obstruction.  No mucosal thickening.      PELVIS:    APPENDIX:  No findings to suggest acute appendicitis.    BLADDER:  Bladder wall thickening which may be due to the decompressed  state of the bladder or due to cystitis.  No stones.    REPRODUCTIVE:  Unremarkable as visualized.      ABDOMEN and PELVIS:    INTRAPERITONEAL SPACE:  Unremarkable.  No free air.  No significant  fluid collection.    BONES/JOINTS:  No acute fracture.  No dislocation.    SOFT TISSUES:  Unremarkable.    VASCULATURE:  Unremarkable.  No abdominal aortic aneurysm.    LYMPH NODES:  Unremarkable.  No enlarged lymph nodes.       Impression:      1.  Bladder wall thickening which may be due to the decompressed state  of the bladder or due to cystitis.  2.  Calcification of the pancreas suggestive of chronic pancreatitis.     This report was finalized on 7/27/2023 1:21 PM by Dr. Yordy Jean-Baptiste MD.       XR Chest 1 View [[id number]] Collected: 07/27/23 1311     Updated: 07/27/23 1313    Narrative:      EXAM:    XR Chest, 1 View     EXAM DATE:    7/27/2023 11:24 AM     CLINICAL HISTORY:    Severe Sepsis Protocol     TECHNIQUE:    Frontal view of the chest.     COMPARISON:    No relevant prior studies available.     FINDINGS:    LUNGS AND PLEURAL SPACES:  Unremarkable.  No consolidation.  No  pneumothorax.    HEART:  Unremarkable.  No cardiomegaly.    MEDIASTINUM:  Unremarkable.    BONES/JOINTS:  Unremarkable.    VASCULATURE:  Ascending aortic prominence that may represent aortic  aneurysm.       Impression:        Ascending aortic prominence that may represent aortic aneurysm.     This report was finalized on 7/27/2023 1:11 PM by Dr. Yordy Jean-Baptiste MD.             I have personally reviewed the above radiology results.   ---------------------------------------------------------------------------------------------------------------------      Pertinent Infectious Disease Results          Assessment & Plan      Assessment       Severe sepsis with lactic acid greater than 2 on admission  ESBL bacteremia  Possible cystitis        Plan      Patient presented to [facility] Emergency Department on 7/27/2023 for chest pain and vomiting.  WBC 7.12.  Chlamydia and gonorrhea negative.  Urinalysis positive with too numerous to count WBCs, however contaminated specimen with too numerous to count RBCs, culture currently in progress.  COVID-19 PCR negative.  Calcitonin slightly elevated at 0.39.  Lactic acid elevated at 2.3 on admission.  Blood cultures from 7/27/2023 2 out of 2 sets positive for ESBL E. coli.  HIV negative.  Hepatitis C reactive.  Chest x-ray from 7/27/2023 reports a sending aortic prominence that may represent an aortic aneurysm.  The abdomen pelvis from 7/27/2023 reports bladder wall thickening that may be due to decompressed state of the bladder or cystitis, calcification of the pancreas to suggest chronic pancreatitis.  CT angiogram of the chest from 7/27/2023 reports a ascending aorta ectasia of 3.9 cm, left lower lobe nodular groundglass attenuation that should be followed up in 3 months.    Today patient is drowsy but awakens when spoken to.  Reports dysuria and left upper quadrant abdominal pain.  Reports having hematuria for several days.  Reports diarrhea.  Currently on room air with no apparent distress.  Lungs clear to auscultation bilaterally.  Abdomen soft, nondistended, left upper quadrant tenderness to palpation.    In the setting of E. coli ESBL bacteremia recommend to continue current antibiotic regimen of ertapenem.  At this time source of bacteremia is unclear.  Patient denies IV drug use.  And it would be difficult to get an adequate specimen on the patient due to patient's significant hematuria.  Repeat blood cultures x2 ordered for today.  We will continue to follow closely and adjust antibiotic therapy as needed.    ANTIMICROBIAL THERAPY    ertapenem (INVanz) 1000 mg in 100ml NS VTB       Again,  thank you Dr. Chavez for allowing us to participate in the care of your patient and please feel free to call for any questions you may have.        Code Status:     Code Status and Medical Interventions:   Ordered at: 07/27/23 1358     Code Status (Patient has no pulse and is not breathing):    CPR (Attempt to Resuscitate)     Medical Interventions (Patient has pulse or is breathing):    Full Support     Release to patient:    Routine Release         YISSEL Gómez  07/28/23  10:57 EDT

## 2023-07-28 NOTE — THERAPY EVALUATION
Acute Care - Occupational Therapy Initial Evaluation   [place]     Patient Name: Edvin Jiménez  : 1970  MRN: [id number]  Today's Date: 2023  Onset of Illness/Injury or Date of Surgery: 23     Referring Physician: Scott    Admit Date: 2023       ICD-10-CM ICD-9-CM   1. Sepsis, due to unspecified organism, unspecified whether acute organ dysfunction present  A41.9 038.9     995.91   2. Cystitis  N30.90 595.9   3. Alcohol withdrawal syndrome with complication  F10.939 291.81     Patient Active Problem List   Diagnosis    Alcohol withdrawal     History reviewed. No pertinent past medical history.  History reviewed. No pertinent surgical history.      OT ASSESSMENT FLOWSHEET (last 12 hours)       OT Evaluation and Treatment       Row Name 23 1545                   OT Time and Intention    Document Type evaluation  -KR        Mode of Treatment occupational therapy  -KR        Patient Effort adequate  -KR           Cognition    Affect/Mental Status (Cognition) WFL  -KR        Orientation Status (Cognition) oriented x 3  -KR        Follows Commands (Cognition) WFL  -KR           Range of Motion Comprehensive    Comment, General Range of Motion BUE 4/5  -KR           Strength Comprehensive (MMT)    Comment, General Manual Muscle Testing (MMT) Assessment BUE 3/5  -KR           Activities of Daily Living    BADL Assessment/Intervention bathing;upper body dressing;lower body dressing;grooming;feeding;toileting  -KR           Bathing Assessment/Intervention    [place] Level (Bathing) bathing skills;minimum assist (75% patient effort)  -KR           Upper Body Dressing Assessment/Training    [place] Level (Upper Body Dressing) upper body dressing skills;set up  -KR           Lower Body Dressing Assessment/Training    [place] Level (Lower Body Dressing) lower body dressing skills;minimum assist (75% patient effort)  -KR           Grooming Assessment/Training    [place]  Level (Grooming) grooming skills;set up  -KR           Self-Feeding Assessment/Training    [place] Level (Feeding) feeding skills;set up  -KR           Toileting Assessment/Training    [place] Level (Toileting) toileting skills;minimum assist (75% patient effort)  -KR           Wound 07/27/23 1814 Left anterior greater trochanter Blisters    Wound - Properties Group Placement Date: 07/27/23  -AW Placement Time: 1814 -AW Present on Hospital Admission: Y  -AW Side: Left  -AW Orientation: anterior  -AW Location: greater trochanter  -AW Primary Wound Type: Blisters  -AW    Retired Wound - Properties Group Placement Date: 07/27/23  -AW Placement Time: 1814 -AW Present on Hospital Admission: Y  -AW Side: Left  -AW Orientation: anterior  -AW Location: greater trochanter  -AW Primary Wound Type: Blisters  -AW    Retired Wound - Properties Group Date first assessed: 07/27/23  -AW Time first assessed: 1814 -AW Present on Hospital Admission: Y  -AW Side: Left  -AW Location: greater trochanter  -AW Primary Wound Type: Blisters  -AW       Plan of Care Review    Plan of Care Reviewed With patient  -KR           Therapy Assessment/Plan (OT)    Planned Therapy Interventions (OT) activity tolerance training;strengthening exercise  -KR           OT Goals    Strength Goal Selection (OT) strength, OT goal 1  -KR        Activity Tolerance Goal Selection (OT) activity tolerance, OT goal 1  -KR           Strength Goal 1 (OT)    Strength Goal 1 (OT) BUE increase x 1 to enhance self care/mobility  -KR        Time Frame (Strength Goal 1, OT) by discharge  -KR            Activity Tolerance Goal 1 (OT)    Activity Tolerance Goal 1 (OT) Increase to enhance self care performance  -KR        Activity Level (Endurance Goal 1, OT) 20 min activity  -KR        Time Frame (Activity Tolerance Goal 1, OT) by discharge  -KR                  User Key  (r) = Recorded By, (t) = Taken By, (c) = Cosigned By      Initials Name Effective Dates     Mike Gonzalez OT 06/16/21 -     Inez Davalos, RN 03/13/23 -                            OT Recommendation and Plan  Planned Therapy Interventions (OT): activity tolerance training, strengthening exercise  Plan of Care Review  Plan of Care Reviewed With: patient  Plan of Care Reviewed With: patient        Time Calculation:     Therapy Charges for Today       Code Description Service Date Service Provider Modifiers Qty    [id number] HC OT EVAL MOD COMPLEXITY 4 7/28/2023 Mike Thomas OT GO 1                 Mike Thomas OT  7/28/2023

## 2023-07-28 NOTE — PLAN OF CARE
Goal Outcome Evaluation:  Plan of Care Reviewed With: patient        Progress: no change  Outcome Evaluation: Pt presents w/ decreased transfers and gait limited by pain this day.  Anticipate return of PLOF once pain subsides. Will follow while in house to progress mobility. Anticipate d/c to prior living arrangements.      Anticipated Discharge Disposition (PT): home

## 2023-07-28 NOTE — PROGRESS NOTES
Antibiotic length of therapy :    Invanz           day       1    Doxycycline          day        2

## 2023-07-28 NOTE — PLAN OF CARE
Goal Outcome Evaluation:  Plan of Care Reviewed With: patient        Progress: no change       Problem: Adult Inpatient Plan of Care  Goal: Plan of Care Review  Outcome: Ongoing, Progressing  Flowsheets (Taken 7/28/2023 0446)  Progress: no change  Plan of Care Reviewed With: patient  Goal: Patient-Specific Goal (Individualized)  Outcome: Ongoing, Progressing  Goal: Absence of Hospital-Acquired Illness or Injury  Outcome: Ongoing, Progressing  Intervention: Identify and Manage Fall Risk  Recent Flowsheet Documentation  Taken 7/28/2023 0400 by Alondra Johnson RN  Safety Promotion/Fall Prevention:   safety round/check completed   fall prevention program maintained  Taken 7/28/2023 0300 by Alondra Johnson RN  Safety Promotion/Fall Prevention:   fall prevention program maintained   safety round/check completed  Taken 7/28/2023 0200 by Alondra Johnson RN  Safety Promotion/Fall Prevention:   safety round/check completed   fall prevention program maintained  Taken 7/28/2023 0100 by Alondra Johnson RN  Safety Promotion/Fall Prevention:   safety round/check completed   fall prevention program maintained  Taken 7/28/2023 0000 by Alondra Johnson RN  Safety Promotion/Fall Prevention:   safety round/check completed   fall prevention program maintained  Taken 7/27/2023 2300 by Alondra Johnson RN  Safety Promotion/Fall Prevention:   safety round/check completed   fall prevention program maintained  Taken 7/27/2023 2200 by Alondra Johnson RN  Safety Promotion/Fall Prevention:   safety round/check completed   fall prevention program maintained  Taken 7/27/2023 2100 by Alondra Johnson RN  Safety Promotion/Fall Prevention:   safety round/check completed   fall prevention program maintained  Taken 7/27/2023 2000 by Alondra Johnson RN  Safety Promotion/Fall Prevention:   safety round/check completed   fall prevention program maintained  Taken 7/27/2023 1900 by Alondra Johnson RN  Safety Promotion/Fall Prevention:   safety round/check  completed   fall prevention program maintained  Intervention: Prevent Skin Injury  Recent Flowsheet Documentation  Taken 7/28/2023 0400 by Alondra Johnson RN  Body Position: position changed independently  Taken 7/28/2023 0200 by Alondra Johnson RN  Body Position: position changed independently  Taken 7/28/2023 0000 by Alondra Johnson RN  Body Position: position changed independently  Taken 7/27/2023 2200 by Alondra Johnson RN  Body Position: position changed independently  Taken 7/27/2023 2000 by Alondra Johnson RN  Body Position: position changed independently  Intervention: Prevent and Manage VTE (Venous Thromboembolism) Risk  Recent Flowsheet Documentation  Taken 7/27/2023 2000 by Alondra Johnson RN  VTE Prevention/Management: (see MAR) other (see comments)  Goal: Optimal Comfort and Wellbeing  Outcome: Ongoing, Progressing  Intervention: Provide Person-Centered Care  Recent Flowsheet Documentation  Taken 7/28/2023 0200 by Alondra Johnson RN  Trust Relationship/Rapport:   care explained   choices provided   thoughts/feelings acknowledged  Taken 7/27/2023 2000 by Alondra Johnson RN  Trust Relationship/Rapport:   care explained   choices provided   thoughts/feelings acknowledged  Goal: Readiness for Transition of Care  Outcome: Ongoing, Progressing     Problem: Skin Injury Risk Increased  Goal: Skin Health and Integrity  Outcome: Ongoing, Progressing  Intervention: Optimize Skin Protection  Recent Flowsheet Documentation  Taken 7/28/2023 0400 by Alondra Johnson RN  Head of Bed (HOB) Positioning: HOB elevated  Taken 7/28/2023 0200 by Alondra Johnson RN  Head of Bed (HOB) Positioning: HOB elevated  Taken 7/28/2023 0000 by Alondra Johnson RN  Head of Bed (HOB) Positioning: HOB elevated  Taken 7/27/2023 2200 by Alondra Johnson RN  Head of Bed (HOB) Positioning: HOB elevated  Taken 7/27/2023 2000 by Alondra Johnson RN  Head of Bed (HOB) Positioning: HOB elevated

## 2023-07-28 NOTE — THERAPY EVALUATION
Acute Care - Physical Therapy Initial Evaluation   [facility]     Patient Name: Edvin Jiménez  : 1970  MRN: [id number]  Today's Date: 2023   Onset of Illness/Injury or Date of Surgery: 23  Visit Dx:     ICD-10-CM ICD-9-CM   1. Sepsis, due to unspecified organism, unspecified whether acute organ dysfunction present  A41.9 038.9     995.91   2. Cystitis  N30.90 595.9   3. Alcohol withdrawal syndrome with complication  F10.939 291.81     Patient Active Problem List   Diagnosis    Alcohol withdrawal     History reviewed. No pertinent past medical history.  History reviewed. No pertinent surgical history.  PT Assessment (last 12 hours)       PT Evaluation and Treatment       Row Name 23 1049          Physical Therapy Time and Intention    Subjective Information complains of;pain  -CS     Document Type evaluation  -CS     Mode of Treatment physical therapy  -CS     Patient Effort adequate  -CS     Symptoms Noted During/After Treatment increased pain  -CS     Comment Pt seen bedside this AM. Pt reports being (I) w/ mobility prior to admission. Pt agreed to evaluation.  -CS       Row Name 23 1049          General Information    Patient Profile Reviewed yes  -CS     Onset of Illness/Injury or Date of Surgery 23  -CS     Referring Physician Scott  -CS     Patient Observations alert;cooperative;agree to therapy  -CS     General Observations of Patient Pt supine, all CCU lines intact  -CS     Risks Reviewed patient:;LOB;nausea/vomiting;dizziness;increased discomfort;change in vital signs;lines disloged  -CS     Benefits Reviewed patient:;improve function;increase independence;increase strength;increase balance;decrease pain;decrease risk of DVT;improve skin integrity;increase knowledge  -CS       Row Name 23 1049          Pain    Additional Documentation --  moderate c/o abd pain  -CS       Row Name 23 1049          Cognition    Orientation Status (Cognition) oriented x 4   -CS       Row Name 07/28/23 1049          Range of Motion (ROM)    Range of Motion --  (B)LE WFL  -CS       Row Name 07/28/23 1049          Strength Comprehensive (MMT)    Comment, General Manual Muscle Testing (MMT) Assessment (B)LE grossly 4+/5  -CS       Row Name 07/28/23 1049          Bed Mobility    Bed Mobility bed mobility (all) activities  -CS     All Activities, Alexander (Bed Mobility) standby assist  -CS       Row Name 07/28/23 1049          Transfers    Comment, (Transfers) Deferred 2/2 pain  -CS       Row Name 07/28/23 1049          Gait/Stairs (Locomotion)    Comment, (Gait/Stairs) Deferred 2/2 pain  -CS       Row Name             Wound 07/27/23 1814 Left anterior greater trochanter Blisters    Wound - Properties Group Placement Date: 07/27/23  -AW Placement Time: 1814  -AW Present on Hospital Admission: Y  -AW Side: Left  -AW Orientation: anterior  -AW Location: greater trochanter  -AW Primary Wound Type: Blisters  -AW    Retired Wound - Properties Group Placement Date: 07/27/23  -AW Placement Time: 1814  -AW Present on Hospital Admission: Y  -AW Side: Left  -AW Orientation: anterior  -AW Location: greater trochanter  -AW Primary Wound Type: Blisters  -AW    Retired Wound - Properties Group Date first assessed: 07/27/23  -AW Time first assessed: 1814  -AW Present on Hospital Admission: Y  -AW Side: Left  -AW Location: greater trochanter  -AW Primary Wound Type: Blisters  -AW      Row Name 07/28/23 1049          Plan of Care Review    Plan of Care Reviewed With patient  -CS     Progress no change  -CS     Outcome Evaluation Pt presents w/ decreased transfers and gait limited by pain this day.  Anticipate return of PLOF once pain subsides. Will follow while in house to progress mobility. Anticipate d/c to prior living arrangements.  -CS       Row Name 07/28/23 1049          Positioning and Restraints    Pre-Treatment Position in bed  -CS     Post Treatment Position bed  -CS     In Bed supine;call  light within reach;encouraged to call for assist  -CS       Row Name 07/28/23 1049          Therapy Assessment/Plan (PT)    Functional Level at Time of Evaluation (PT) SBA/CGA  -CS     PT Diagnosis (PT) impaired functional mobility  -CS     Rehab Potential (PT) good, to achieve stated therapy goals  -CS     Criteria for Skilled Interventions Met (PT) yes;meets criteria;skilled treatment is necessary  -CS     Therapy Frequency (PT) 2 times/wk  2-5x/week  -CS     Predicted Duration of Therapy Intervention (PT) while in house  -CS     Problem List (PT) problems related to;balance;mobility;strength;pain  -CS     Activity Limitations Related to Problem List (PT) unable to ambulate safely;unable to transfer safely  -CS     Comment, Therapy Assessment/Plan (PT) Pt presents w/ decreased transfers and gait limited by pain this day. Anticipate return of PLOF once pain subsides. Will follow while in house to progress mobility. Anticipate d/c to prior living arrangements.  -CS       Row Name 07/28/23 1049          PT Evaluation Complexity    History, PT Evaluation Complexity 3 or more personal factors and/or comorbidities  -CS     Examination of Body Systems (PT Eval Complexity) total of 4 or more elements  -CS     Clinical Presentation (PT Evaluation Complexity) evolving  -CS     Clinical Decision Making (PT Evaluation Complexity) moderate complexity  -CS     Overall Complexity (PT Evaluation Complexity) moderate complexity  -CS       Row Name 07/28/23 1049          Therapy Plan Review/Discharge Plan (PT)    Therapy Plan Review (PT) evaluation/treatment results reviewed;care plan/treatment goals reviewed;risks/benefits reviewed;current/potential barriers reviewed;participants voiced agreement with care plan;participants included;patient  -CS       Row Name 07/28/23 1049          Physical Therapy Goals    Transfer Goal Selection (PT) transfer, PT goal 1  -     Gait Training Goal Selection (PT) gait training, PT goal 1  -CS        Row Name 07/28/23 1049          Transfer Goal 1 (PT)    Activity/Assistive Device (Transfer Goal 1, PT) transfers, all  -CS     [place] Level/Cues Needed (Transfer Goal 1, PT) standby assist  -CS     Time Frame (Transfer Goal 1, PT) long term goal (LTG);by discharge  -       Row Name 07/28/23 1049          Gait Training Goal 1 (PT)    Activity/Assistive Device (Gait Training Goal 1, PT) gait (walking locomotion)  -CS     [place] Level (Gait Training Goal 1, PT) standby assist  -CS     Distance (Gait Training Goal 1, PT) 150'  -CS     Time Frame (Gait Training Goal 1, PT) long term goal (LTG);by discharge  -CS               User Key  (r) = Recorded By, (t) = Taken By, (c) = Cosigned By      Initials Name Provider Type     Kulwant Cuevas, PT Physical Therapist    Inez Davalos, RN Registered Nurse                    Physical Therapy Education       Title: PT OT SLP Therapies (Done)       Topic: Physical Therapy (Done)       Point: Mobility training (Done)       Learning Progress Summary             Patient Acceptance, E,TB, VU by  at 7/28/2023 1302                         Point: Home exercise program (Done)       Learning Progress Summary             Patient Acceptance, E,TB, VU by  at 7/28/2023 1302                         Point: Body mechanics (Done)       Learning Progress Summary             Patient Acceptance, E,TB, VU by  at 7/28/2023 1302                         Point: Precautions (Done)       Learning Progress Summary             Patient Acceptance, E,TB, VU by  at 7/28/2023 1302                                         User Key       Initials Effective Dates Name Provider Type Discipline     05/31/23 -  Kulwant Cuevas, PT Physical Therapist PT                  PT Recommendation and Plan  Anticipated Discharge Disposition (PT): home  Planned Therapy Interventions (PT): balance training, bed mobility training, gait training, home exercise program, neuromuscular re-education,  patient/family education, strengthening, transfer training  Therapy Frequency (PT): 2 times/wk (2-5x/week)  Plan of Care Reviewed With: patient  Progress: no change  Outcome Evaluation: Pt presents w/ decreased transfers and gait limited by pain this day.  Anticipate return of PLOF once pain subsides. Will follow while in house to progress mobility. Anticipate d/c to prior living arrangements.       Time Calculation:    PT Charges       Row Name 07/28/23 1304             Time Calculation    Start Time 1030  -CS      PT Received On 07/28/23  -      PT Goal Re-Cert Due Date 08/11/23  -                User Key  (r) = Recorded By, (t) = Taken By, (c) = Cosigned By      Initials Name Provider Type    CS Kulwant Cuevas, PT Physical Therapist                  Therapy Charges for Today       Code Description Service Date Service Provider Modifiers Qty    71453923248 HC PT EVAL MOD COMPLEXITY 4 7/28/2023 Kulwant Cuevas, PT GP 1            PT G-Codes  AM-PAC 6 Clicks Score (PT): 22    Kulwant Cuevas PT  7/28/2023

## 2023-07-28 NOTE — PROGRESS NOTES
THC Physician - Brief Progress Note  PERMANENT  07/27/2023 20:40    [facility] - [facility] - [place] - CCU - 10 - VIV QUINTERO ([facility])    EDILSON NELSON    Date of Service 07/27/2023 20:40    HPI/Events of Note [facility] Provider Assessment Note    Mr. Nelson is a 52 year old male with altered mental status attributed to alcohol withdrawla. Consider also encephalopathy related to urinary tract infection and/or related to alcohol-rleated lvier disease.    On video P 68, /112, RR 17, O2 100%. Ms. Schmidt is lying in bed on room air. Breathing appears unlabored. No overt tremor. Currently magneisum sulfate 2gm IV is infusing. Currently IV fluids @ 125 cc/hr are infusing.      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 Tele ICU will remain available to help. Please call tele ICU with any clinical needs.      __x___   Video Assessment performed  __x___   Most recent labs reviewed  __x___   Vital Signs reviewed  __x___   Best Practices addressed:                 VTE prophylaxis: on enoxaparin                 SUP (when indicated):                 Current Glucose:                      Please notify bedside  physician when present or [facility] if glc > 180 X 2                 Sepsis guidelines:                 Lung protective strategy                 Targeted Temperature Management:    _____     Spoke with bedside RN  _____     Orders written      Contact [facility] for any needs if bedside physician is not present.      Interventions Major-Change in mental status - evaluation and management, Electrolyte abnormality - evaluation and management, Other: ETOH withdrawal        Electronically Signed by: Robert Renteria) on 07/27/2023 20:52

## 2023-07-28 NOTE — NURSING NOTE
Received consult for blister to left hip.     Patient with small, dry blister to left hip.  Does not appear to be pressure related.  Will treat with Z-Guard BID and leave open to air.    Demarco score 20.

## 2023-07-29 LAB
ANION GAP SERPL CALCULATED.3IONS-SCNC: 9 MMOL/L (ref 5–15)
ANISOCYTOSIS BLD QL: ABNORMAL
BACTERIA SPEC AEROBE CULT: ABNORMAL
BH CV ECHO MEAS - ACS: 2.2 CM
BH CV ECHO MEAS - AO MAX PG: 7 MMHG
BH CV ECHO MEAS - AO MEAN PG: 4 MMHG
BH CV ECHO MEAS - AO ROOT DIAM: 4.4 CM
BH CV ECHO MEAS - AO V2 MAX: 132 CM/SEC
BH CV ECHO MEAS - AO V2 VTI: 23.7 CM
BH CV ECHO MEAS - EDV(CUBED): 97.3 ML
BH CV ECHO MEAS - EDV(MOD-SP4): 58 ML
BH CV ECHO MEAS - EF(MOD-SP4): 57.1 %
BH CV ECHO MEAS - ESV(CUBED): 32.8 ML
BH CV ECHO MEAS - ESV(MOD-SP4): 24.9 ML
BH CV ECHO MEAS - FS: 30.4 %
BH CV ECHO MEAS - IVS/LVPW: 1.1 CM
BH CV ECHO MEAS - IVSD: 1.1 CM
BH CV ECHO MEAS - LA DIMENSION: 3.7 CM
BH CV ECHO MEAS - LAT PEAK E' VEL: 5.4 CM/SEC
BH CV ECHO MEAS - LV DIASTOLIC VOL/BSA (35-75): 32.9 CM2
BH CV ECHO MEAS - LV MASS(C)D: 169.9 GRAMS
BH CV ECHO MEAS - LV SYSTOLIC VOL/BSA (12-30): 14.1 CM2
BH CV ECHO MEAS - LVIDD: 4.6 CM
BH CV ECHO MEAS - LVIDS: 3.2 CM
BH CV ECHO MEAS - LVOT AREA: 3.5 CM2
BH CV ECHO MEAS - LVOT DIAM: 2.1 CM
BH CV ECHO MEAS - LVPWD: 1 CM
BH CV ECHO MEAS - MED PEAK E' VEL: 6.2 CM/SEC
BH CV ECHO MEAS - MV A MAX VEL: 96.7 CM/SEC
BH CV ECHO MEAS - MV E MAX VEL: 60 CM/SEC
BH CV ECHO MEAS - MV E/A: 0.62
BH CV ECHO MEAS - PA ACC TIME: 0.04 SEC
BH CV ECHO MEAS - PI END-D VEL: 107 CM/SEC
BH CV ECHO MEAS - RAP SYSTOLE: 10 MMHG
BH CV ECHO MEAS - RVSP: 32.3 MMHG
BH CV ECHO MEAS - SI(MOD-SP4): 18.8 ML/M2
BH CV ECHO MEAS - SV(MOD-SP4): 33.1 ML
BH CV ECHO MEAS - TAPSE (>1.6): 1.69 CM
BH CV ECHO MEAS - TR MAX PG: 22.3 MMHG
BH CV ECHO MEAS - TR MAX VEL: 236 CM/SEC
BH CV ECHO MEASUREMENTS AVERAGE E/E' RATIO: 10.34
BUN SERPL-MCNC: 11 MG/DL (ref 6–20)
BUN/CREAT SERPL: 10.8 (ref 7–25)
CALCIUM SPEC-SCNC: 8.1 MG/DL (ref 8.6–10.5)
CHLORIDE SERPL-SCNC: 101 MMOL/L (ref 98–107)
CO2 SERPL-SCNC: 25 MMOL/L (ref 22–29)
CREAT SERPL-MCNC: 1.02 MG/DL (ref 0.76–1.27)
DEPRECATED RDW RBC AUTO: 54.6 FL (ref 37–54)
EGFRCR SERPLBLD CKD-EPI 2021: 88.4 ML/MIN/1.73
ERYTHROCYTE [DISTWIDTH] IN BLOOD BY AUTOMATED COUNT: 19 % (ref 12.3–15.4)
FOLATE SERPL-MCNC: 18.4 NG/ML (ref 4.78–24.2)
GLUCOSE SERPL-MCNC: 133 MG/DL (ref 65–99)
HCT VFR BLD AUTO: 25.3 % (ref 37.5–51)
HGB BLD-MCNC: 8 G/DL (ref 13–17.7)
HYPOCHROMIA BLD QL: ABNORMAL
LYMPHOCYTES # BLD MANUAL: 1.13 10*3/MM3 (ref 0.7–3.1)
LYMPHOCYTES NFR BLD MANUAL: 5 % (ref 5–12)
MCH RBC QN AUTO: 25.5 PG (ref 26.6–33)
MCHC RBC AUTO-ENTMCNC: 31.6 G/DL (ref 31.5–35.7)
MCV RBC AUTO: 80.6 FL (ref 79–97)
MONOCYTES # BLD: 0.3 10*3/MM3 (ref 0.1–0.9)
NEUTROPHILS # BLD AUTO: 4.51 10*3/MM3 (ref 1.7–7)
NEUTROPHILS NFR BLD MANUAL: 76 % (ref 42.7–76)
PLATELET # BLD AUTO: 95 10*3/MM3 (ref 140–450)
PMV BLD AUTO: 9.7 FL (ref 6–12)
POTASSIUM SERPL-SCNC: 4.2 MMOL/L (ref 3.5–5.2)
RBC # BLD AUTO: 3.14 10*6/MM3 (ref 4.14–5.8)
SCAN SLIDE: NORMAL
SMALL PLATELETS BLD QL SMEAR: ABNORMAL
SODIUM SERPL-SCNC: 135 MMOL/L (ref 136–145)
VARIANT LYMPHS NFR BLD MANUAL: 19 % (ref 19.6–45.3)
VIT B12 BLD-MCNC: 545 PG/ML (ref 211–946)
WBC NRBC COR # BLD: 5.93 10*3/MM3 (ref 3.4–10.8)

## 2023-07-29 PROCEDURE — 87522 HEPATITIS C REVRS TRNSCRPJ: CPT | Performed by: STUDENT IN AN ORGANIZED HEALTH CARE EDUCATION/TRAINING PROGRAM

## 2023-07-29 PROCEDURE — 99232 SBSQ HOSP IP/OBS MODERATE 35: CPT | Performed by: NURSE PRACTITIONER

## 2023-07-29 PROCEDURE — 99233 SBSQ HOSP IP/OBS HIGH 50: CPT | Performed by: STUDENT IN AN ORGANIZED HEALTH CARE EDUCATION/TRAINING PROGRAM

## 2023-07-29 PROCEDURE — 80048 BASIC METABOLIC PNL TOTAL CA: CPT | Performed by: STUDENT IN AN ORGANIZED HEALTH CARE EDUCATION/TRAINING PROGRAM

## 2023-07-29 PROCEDURE — 25010000002 ENOXAPARIN PER 10 MG: Performed by: STUDENT IN AN ORGANIZED HEALTH CARE EDUCATION/TRAINING PROGRAM

## 2023-07-29 PROCEDURE — 25010000002 ERTAPENEM PER 500 MG: Performed by: NURSE PRACTITIONER

## 2023-07-29 PROCEDURE — 85007 BL SMEAR W/DIFF WBC COUNT: CPT | Performed by: STUDENT IN AN ORGANIZED HEALTH CARE EDUCATION/TRAINING PROGRAM

## 2023-07-29 PROCEDURE — 25010000002 KETOROLAC TROMETHAMINE PER 15 MG: Performed by: STUDENT IN AN ORGANIZED HEALTH CARE EDUCATION/TRAINING PROGRAM

## 2023-07-29 PROCEDURE — 94799 UNLISTED PULMONARY SVC/PX: CPT

## 2023-07-29 PROCEDURE — 25010000002 LORAZEPAM PER 2 MG: Performed by: STUDENT IN AN ORGANIZED HEALTH CARE EDUCATION/TRAINING PROGRAM

## 2023-07-29 PROCEDURE — 94761 N-INVAS EAR/PLS OXIMETRY MLT: CPT

## 2023-07-29 PROCEDURE — 25010000002 THIAMINE PER 100 MG: Performed by: STUDENT IN AN ORGANIZED HEALTH CARE EDUCATION/TRAINING PROGRAM

## 2023-07-29 PROCEDURE — 86803 HEPATITIS C AB TEST: CPT | Performed by: STUDENT IN AN ORGANIZED HEALTH CARE EDUCATION/TRAINING PROGRAM

## 2023-07-29 PROCEDURE — 85025 COMPLETE CBC W/AUTO DIFF WBC: CPT | Performed by: STUDENT IN AN ORGANIZED HEALTH CARE EDUCATION/TRAINING PROGRAM

## 2023-07-29 RX ORDER — CHLORDIAZEPOXIDE HYDROCHLORIDE 25 MG/1
50 CAPSULE, GELATIN COATED ORAL 3 TIMES DAILY PRN
Status: ACTIVE | OUTPATIENT
Start: 2023-07-29 | End: 2023-07-30

## 2023-07-29 RX ORDER — HYDRALAZINE HYDROCHLORIDE 10 MG/1
25 TABLET, FILM COATED ORAL 3 TIMES DAILY PRN
Status: DISCONTINUED | OUTPATIENT
Start: 2023-07-29 | End: 2023-08-10 | Stop reason: HOSPADM

## 2023-07-29 RX ORDER — CHLORDIAZEPOXIDE HYDROCHLORIDE 5 MG/1
10 CAPSULE, GELATIN COATED ORAL ONCE
Status: COMPLETED | OUTPATIENT
Start: 2023-08-01 | End: 2023-08-01

## 2023-07-29 RX ORDER — CHLORDIAZEPOXIDE HYDROCHLORIDE 5 MG/1
10 CAPSULE, GELATIN COATED ORAL 2 TIMES DAILY
Status: COMPLETED | OUTPATIENT
Start: 2023-07-31 | End: 2023-07-31

## 2023-07-29 RX ORDER — CHLORDIAZEPOXIDE HYDROCHLORIDE 25 MG/1
25 CAPSULE, GELATIN COATED ORAL EVERY 8 HOURS SCHEDULED
Status: COMPLETED | OUTPATIENT
Start: 2023-07-29 | End: 2023-07-29

## 2023-07-29 RX ORDER — TRAMADOL HYDROCHLORIDE 50 MG/1
50 TABLET ORAL EVERY 6 HOURS PRN
Status: DISPENSED | OUTPATIENT
Start: 2023-07-29 | End: 2023-08-05

## 2023-07-29 RX ORDER — CHLORDIAZEPOXIDE HYDROCHLORIDE 25 MG/1
25 CAPSULE, GELATIN COATED ORAL 2 TIMES DAILY
Status: COMPLETED | OUTPATIENT
Start: 2023-07-30 | End: 2023-07-30

## 2023-07-29 RX ORDER — IBUPROFEN 800 MG/1
800 TABLET ORAL EVERY 6 HOURS PRN
Status: DISCONTINUED | OUTPATIENT
Start: 2023-07-29 | End: 2023-08-10 | Stop reason: HOSPADM

## 2023-07-29 RX ORDER — LOSARTAN POTASSIUM 50 MG/1
100 TABLET ORAL
Status: DISCONTINUED | OUTPATIENT
Start: 2023-07-30 | End: 2023-08-10 | Stop reason: HOSPADM

## 2023-07-29 RX ORDER — HYDRALAZINE HYDROCHLORIDE 10 MG/1
20 TABLET, FILM COATED ORAL ONCE
Status: COMPLETED | OUTPATIENT
Start: 2023-07-29 | End: 2023-07-29

## 2023-07-29 RX ADMIN — TRAMADOL HYDROCHLORIDE 50 MG: 50 TABLET, COATED ORAL at 21:01

## 2023-07-29 RX ADMIN — ATORVASTATIN CALCIUM 40 MG: 40 TABLET, FILM COATED ORAL at 21:02

## 2023-07-29 RX ADMIN — THIAMINE HYDROCHLORIDE 200 MG: 100 INJECTION, SOLUTION INTRAMUSCULAR; INTRAVENOUS at 14:56

## 2023-07-29 RX ADMIN — ERTAPENEM SODIUM 1000 MG: 1 INJECTION INTRAMUSCULAR; INTRAVENOUS at 07:48

## 2023-07-29 RX ADMIN — CHLORDIAZEPOXIDE HYDROCHLORIDE 25 MG: 25 CAPSULE ORAL at 11:54

## 2023-07-29 RX ADMIN — CHLORDIAZEPOXIDE HYDROCHLORIDE 25 MG: 25 CAPSULE ORAL at 21:01

## 2023-07-29 RX ADMIN — TRAMADOL HYDROCHLORIDE 50 MG: 50 TABLET, COATED ORAL at 14:12

## 2023-07-29 RX ADMIN — DOCUSATE SODIUM 50 MG AND SENNOSIDES 8.6 MG 2 TABLET: 8.6; 5 TABLET, FILM COATED ORAL at 09:34

## 2023-07-29 RX ADMIN — LOSARTAN POTASSIUM 25 MG: 25 TABLET, FILM COATED ORAL at 09:34

## 2023-07-29 RX ADMIN — LORAZEPAM 1 MG: 2 INJECTION INTRAMUSCULAR; INTRAVENOUS at 00:12

## 2023-07-29 RX ADMIN — ACETAMINOPHEN 650 MG: 325 TABLET ORAL at 12:58

## 2023-07-29 RX ADMIN — KETOROLAC TROMETHAMINE 30 MG: 30 INJECTION, SOLUTION INTRAMUSCULAR; INTRAVENOUS at 02:22

## 2023-07-29 RX ADMIN — ACETAMINOPHEN 650 MG: 325 TABLET ORAL at 18:31

## 2023-07-29 RX ADMIN — Medication 1 TABLET: at 09:34

## 2023-07-29 RX ADMIN — ACETAMINOPHEN 650 MG: 325 TABLET ORAL at 01:47

## 2023-07-29 RX ADMIN — ENOXAPARIN SODIUM 40 MG: 40 INJECTION SUBCUTANEOUS at 21:02

## 2023-07-29 RX ADMIN — Medication 10 ML: at 21:01

## 2023-07-29 RX ADMIN — HYDRALAZINE HYDROCHLORIDE 25 MG: 25 TABLET, FILM COATED ORAL at 21:44

## 2023-07-29 RX ADMIN — Medication 10 ML: at 09:37

## 2023-07-29 RX ADMIN — DOCUSATE SODIUM 50 MG AND SENNOSIDES 8.6 MG 2 TABLET: 8.6; 5 TABLET, FILM COATED ORAL at 21:02

## 2023-07-29 RX ADMIN — HYDRALAZINE HYDROCHLORIDE 20 MG: 10 TABLET, FILM COATED ORAL at 15:55

## 2023-07-29 RX ADMIN — IBUPROFEN 800 MG: 400 TABLET, FILM COATED ORAL at 16:20

## 2023-07-29 RX ADMIN — THIAMINE HYDROCHLORIDE 200 MG: 100 INJECTION, SOLUTION INTRAMUSCULAR; INTRAVENOUS at 06:06

## 2023-07-29 RX ADMIN — CHLORDIAZEPOXIDE HYDROCHLORIDE 25 MG: 25 CAPSULE ORAL at 15:55

## 2023-07-29 RX ADMIN — ACETAMINOPHEN 650 MG: 325 TABLET ORAL at 07:48

## 2023-07-29 RX ADMIN — SODIUM CHLORIDE, POTASSIUM CHLORIDE, SODIUM LACTATE AND CALCIUM CHLORIDE 125 ML/HR: 600; 310; 30; 20 INJECTION, SOLUTION INTRAVENOUS at 06:07

## 2023-07-29 RX ADMIN — CARVEDILOL 6.25 MG: 6.25 TABLET, FILM COATED ORAL at 18:21

## 2023-07-29 RX ADMIN — FOLIC ACID 1 MG: 5 INJECTION, SOLUTION INTRAMUSCULAR; INTRAVENOUS; SUBCUTANEOUS at 09:34

## 2023-07-29 RX ADMIN — LORAZEPAM 1 MG: 2 INJECTION INTRAMUSCULAR; INTRAVENOUS at 06:06

## 2023-07-29 RX ADMIN — ASPIRIN 81 MG: 81 TABLET, CHEWABLE ORAL at 09:34

## 2023-07-29 RX ADMIN — CARVEDILOL 6.25 MG: 6.25 TABLET, FILM COATED ORAL at 09:34

## 2023-07-29 RX ADMIN — THIAMINE HYDROCHLORIDE 200 MG: 100 INJECTION, SOLUTION INTRAMUSCULAR; INTRAVENOUS at 21:01

## 2023-07-29 NOTE — PROGRESS NOTES
PROGRESS NOTE         Patient Identification:  Name:  Edvin Jiménez  Age:  52 y.o.  Sex:  male  :  1970  MRN:  [id number]  Visit Number:  [id number]  Primary Care Provider:  Provider, No Known         LOS: 2 days       ----------------------------------------------------------------------------------------------------------------------  Subjective       Chief Complaints:    Alcohol Problem        Interval History:      Patient became aggressive and verbally abusive overnight with staff.  Calm this morning thus far.  Low-grade fever overnight with Tmax of 100.7.  Currently on room air with no apparent distress.  Lungs clear to auscultation bilaterally.  Abdomen soft.  Complains of left-sided flank pain that radiates from left flank to left upper quadrant of abdomen.  Continues to have hematuria.  WBC normal at 5.93.  Urine culture from 2023 finalizes greater than 100,000 colonies of E. coli ESBL.  Repeat blood cultures from 2023 currently in process.    Review of Systems:    Constitutional: no fever, chills and night sweats.  Generalized fatigue.  Eyes: no eye drainage, itching or redness.  HEENT: no mouth sores, dysphagia or nose bleed.  Respiratory: no for shortness of breath, cough or production of sputum.  Cardiovascular: no chest pain, no palpitations, no orthopnea.  Gastrointestinal: no nausea, vomiting or diarrhea. No abdominal pain, hematemesis or rectal bleeding.  Genitourinary: no dysuria or polyuria.  Hematologic/lymphatic: no lymph node abnormalities, no easy bruising or easy bleeding.  Musculoskeletal: no muscle or joint pain.  Skin: No rash and no itching.  Neurological: no loss of consciousness, no seizure, no headache.  Behavioral/Psych: no depression or suicidal ideation.  Endocrine: no hot flashes.  Immunologic: negative.    ----------------------------------------------------------------------------------------------------------------------      Objective        Current Hospital Meds:  aspirin, 81 mg, Oral, Daily  atorvastatin, 40 mg, Oral, Nightly  carvedilol, 6.25 mg, Oral, BID With Meals  [START ON 7/31/2023] chlordiazePOXIDE, 10 mg, Oral, BID  [START ON 8/1/2023] chlordiazePOXIDE, 10 mg, Oral, Once  chlordiazePOXIDE, 25 mg, Oral, Q8H  [START ON 7/30/2023] chlordiazePOXIDE, 25 mg, Oral, BID  enoxaparin, 40 mg, Subcutaneous, Nightly  ertapenem, 1,000 mg, Intravenous, Q24H  folic acid (FOLVITE) IVPB, 1 mg, Intravenous, Daily  [START ON 7/30/2023] losartan, 100 mg, Oral, Q24H  senna-docusate sodium, 2 tablet, Oral, BID  sodium chloride, 10 mL, Intravenous, Q12H  sodium chloride, 10 mL, Intravenous, Q12H  thiamine (B-1) IV, 200 mg, Intravenous, Q8H   Followed by  [START ON 8/1/2023] thiamine, 100 mg, Oral, Daily         ----------------------------------------------------------------------------------------------------------------------    Vital Signs:  Temp:  [97.9 øF (36.6 øC)-100.7 øF (38.2 øC)] 98.6 øF (37 øC)  Heart Rate:  [62-95] 62  Resp:  [14-20] 18  BP: (137-188)/() 181/128  Mean Arterial Pressure (Non-Invasive) for the past 24 hrs (Last 3 readings):   Noninvasive MAP (mmHg)   07/29/23 0600 157   07/29/23 0500 140   07/29/23 0400 118     SpO2 Percentage    07/29/23 0500 07/29/23 0600 07/29/23 0718   SpO2: 98% 99% 97%     SpO2:  [70 %-100 %] 97 %  on   ;   Device (Oxygen Therapy): room air    Body mass index is 20.41 kg/mý.  Wt Readings from Last 3 Encounters:   07/28/23 62.7 kg (138 lb 3.7 oz)        Intake/Output Summary (Last 24 hours) at 7/29/2023 1131  Last data filed at 7/29/2023 0934  Gross per 24 hour   Intake 3236.58 ml   Output 2850 ml   Net 386.58 ml     Diet: Regular/House Diet; Texture: Regular Texture (IDDSI 7); Fluid Consistency: Thin (IDDSI 0)  ----------------------------------------------------------------------------------------------------------------------      Physical Exam:    Constitutional:  Well-developed and well-nourished.   -American male.  Currently on room air with no apparent distress.  HENT:  Head: Normocephalic and atraumatic.  Mouth:  Moist mucous membranes.    Eyes:  Conjunctivae and EOM are normal.  No scleral icterus.  Neck:  Neck supple.  No JVD present.    Cardiovascular:  Normal rate, regular rhythm and normal heart sounds with no murmur. No edema.  Pulmonary/Chest:  No respiratory distress, no wheezes, no crackles, with normal breath sounds and good air movement.  Abdominal:  Soft.  Bowel sounds are normal.  No distension and no tenderness.   Musculoskeletal:  No edema, no tenderness, and no deformity.  No swelling or redness of joints.  Neurological:  Alert and oriented to person, place, and time.  No facial droop.  No slurred speech.   Skin:  Skin is warm and dry.  No rash noted.  No pallor.   Psychiatric:  Normal mood and affect.  Behavior is normal.        ----------------------------------------------------------------------------------------------------------------------  Results from last 7 days   Lab Units 07/27/23  1236 07/27/23  1108   HSTROP T ng/L 22* 24*       Results from last 7 days   Lab Units 07/27/23  1236   CHOLESTEROL mg/dL 138   TRIGLYCERIDES mg/dL 53   HDL CHOL mg/dL 110*   LDL CHOL mg/dL 16       Results from last 7 days   Lab Units 07/29/23  0018 07/28/23  0004 07/27/23  1852 07/27/23  1530 07/27/23  1236 07/27/23  1108   LACTATE mmol/L  --   --  1.6 3.0* 2.3*  --    WBC 10*3/mm3 5.93 7.12  --   --   --  6.68   HEMOGLOBIN g/dL 8.0* 9.5*  --   --   --  10.1*   HEMATOCRIT % 25.3* 30.6*  --   --   --  31.3*   MCV fL 80.6 81.2  --   --   --  78.3*   MCHC g/dL 31.6 31.0*  --   --   --  32.3   PLATELETS 10*3/mm3 95* 97*  --   --   --  128*   INR   --   --   --   --  1.02  --      Results from last 7 days   Lab Units 07/29/23  0018 07/28/23  0004 07/27/23  1852 07/27/23  1108   SODIUM mmol/L 135* 133*  --  133*   POTASSIUM mmol/L 4.2 3.7  --  4.0   MAGNESIUM mg/dL  --  2.5 1.4* 1.2*   CHLORIDE  mmol/L 101 96*  --  92*   CO2 mmol/L 25.0 23.9  --  29.0   BUN mg/dL 11 10  --  8   CREATININE mg/dL 1.02 1.01  --  0.87   CALCIUM mg/dL 8.1* 7.8*  --  8.7   GLUCOSE mg/dL 133* 173*  --  128*   ALBUMIN g/dL  --   --   --  3.6   BILIRUBIN mg/dL  --   --   --  0.8   ALK PHOS U/L  --   --   --  190*   AST (SGOT) U/L  --   --   --  190*   ALT (SGPT) U/L  --   --   --  73*   Estimated Creatinine Clearance: 75.1 mL/min (by C-G formula based on SCr of 1.02 mg/dL).  No results found for: AMMONIA    Hemoglobin A1C   Date/Time Value Ref Range Status   07/27/2023 1108 5.60 4.80 - 5.60 % Final     Lab Results   Component Value Date    HGBA1C 5.60 07/27/2023     Lab Results   Component Value Date    TSH 1.120 07/27/2023       Blood Culture   Date Value Ref Range Status   07/27/2023 Gram Negative Bacilli (C)  Preliminary   07/27/2023 Gram Negative Bacilli (C)  Preliminary     Urine Culture   Date Value Ref Range Status   07/27/2023 >100,000 CFU/mL Escherichia coli ESBL (A)  Final     Comment:       Consider infectious disease consult.  Susceptibility results may not correlate to clinical outcomes.     No results found for: WOUNDCX  No results found for: STOOLCX  No results found for: RESPCX  Pain Management Panel          Latest Ref Rng & Units 7/27/2023   Pain Management Panel   Creatinine, Urine mg/dL 47.4    Amphetamine, Urine Qual Negative Negative    Barbiturates Screen, Urine Negative Negative    Benzodiazepine Screen, Urine Negative Negative    Buprenorphine, Screen, Urine Negative Negative    Cocaine Screen, Urine Negative Negative    Fentanyl, Urine Negative Negative    Methadone Screen , Urine Negative Negative    Methamphetamine, Ur Negative Negative          ----------------------------------------------------------------------------------------------------------------------  Imaging Results (Last 24 Hours)       Procedure Component Value Units Date/Time     Liver [[id number]] Resulted: 07/28/23 1920     Updated:  07/28/23 1920            ----------------------------------------------------------------------------------------------------------------------    Pertinent Infectious Disease Results      Presented to [facility] Emergency Department on 7/27/2023 for chest pain and vomiting. Chlamydia and gonorrhea negative.  COVID-19 PCR negative.  Procalcitonin slightly elevated at 0.39.  Lactic acid elevated at 2.3 on admission.  Blood cultures from 7/27/2023 2 out of 2 sets positive for ESBL E. coli.  HIV negative.  Hepatitis C reactive.           Assessment/Plan       Assessment     Severe sepsis with lactic acid greater than 2 on admission  ESBL bacteremia  Possible cystitis        Plan        Patient became aggressive and verbally abusive overnight with staff.  Calm this morning thus far.  Low-grade fever overnight with Tmax of 100.7.  Currently on room air with no apparent distress.  Lungs clear to auscultation bilaterally.  Abdomen soft.  Complains of left-sided flank pain that radiates from left flank to left upper quadrant of abdomen.  Continues to have hematuria.  WBC normal at 5.93.  Urine culture from 7/27/2023 finalizes greater than 100,000 colonies of E. coli ESBL.  Repeat blood cultures from 7/28/2023 currently in process.    Based on finalization of culture results patient require 2-week course of ertapenem 1 g IV every 24 hours for treatment of ESBL bacteremia with urinary source.  We will continue to follow closely and adjust antibiotic therapy as needed.      ANTIMICROBIAL THERAPY    ertapenem (INVanz) 1000 mg in 100ml NS VTB     Code Status:   Code Status and Medical Interventions:   Ordered at: 07/27/23 1358     Code Status (Patient has no pulse and is not breathing):    CPR (Attempt to Resuscitate)     Medical Interventions (Patient has pulse or is breathing):    Full Support     Release to patient:    Routine Release       YISSEL Gómez  07/29/23  11:31 EDT

## 2023-07-29 NOTE — PLAN OF CARE
Goal Outcome Evaluation:  Plan of Care Reviewed With: patient      Pt transferred from CCU to room 324. Hypertensive, MD notified, new orders given. Pt c/o back pain, administered prn ibuprofen, pt tolerated well. Will continue to follow plan of care.  Progress: improving

## 2023-07-29 NOTE — NURSING NOTE
Answered call light in room, pt appears angry and cursing at staff stating I want my medicine. I ask pt to not curse at me and ask what medicine he was talking about. Pt stated Im detoxing and I want my fucking detox medicine, I don't understand what you country people are talking about and you don't have any sense. VANITA preformed while in room with Nikki MUNSON present.

## 2023-07-29 NOTE — NURSING NOTE
Pt repeatedly calling out for pain meds, PRN toradol and tylenol given. Pt stated the meds didn't help and just made him feel strong like he could get up and do 50 push-ups. Repeatedly yelled at me and stated he wants morphine, spoke with Dr Tim. Offered to make Pt NPO to help with abdominal pain and get additional testing done. Pt refused and said get me some damn food now, Dr Tim notified.

## 2023-07-29 NOTE — NURSING NOTE
Contacted security about patient's behavior towards staff. Security rounded on the patient. The patient then became apologetic, saying he was sorry to the security guards. The security guards told him to not be cussing at staff anymore. He verbalized understanding.

## 2023-07-29 NOTE — NURSING NOTE
"Patient keeps yelling out, stating we are not caring for him. States he wants, \"the good medications\". States he has back pain and a headache and wants the \"Detox meds\". Patient has tylenol available at this time for pain. He has made statements that, \"We are just trying to kick a poor black man out on the street\". Patient has made several racial comments at staff, and curses at us frequently.   "

## 2023-07-29 NOTE — NURSING NOTE
Pt has repeatedly called out and yelled out for meds this shift, states he needs detox meds for his back pain. Pt currently not scoring high enough on CIWA for ativan. Pt educated on purpose of CIWA protocol and that it is not given for back pain. Pt has repeatedly called me stupid, as well as other co-workers, and has cussed at me every time I have entered the room.

## 2023-07-29 NOTE — PLAN OF CARE
Goal Outcome Evaluation:  Plan of Care Reviewed With: patient        Progress: no change       Problem: Adult Inpatient Plan of Care  Goal: Plan of Care Review  Outcome: Ongoing, Progressing  Flowsheets (Taken 7/29/2023 0259)  Progress: no change  Plan of Care Reviewed With: patient  Goal: Patient-Specific Goal (Individualized)  Outcome: Ongoing, Progressing  Goal: Absence of Hospital-Acquired Illness or Injury  Outcome: Ongoing, Progressing  Intervention: Identify and Manage Fall Risk  Recent Flowsheet Documentation  Taken 7/29/2023 0200 by Alondra Johnson RN  Safety Promotion/Fall Prevention:   safety round/check completed   fall prevention program maintained  Taken 7/29/2023 0100 by Alondra Johnson RN  Safety Promotion/Fall Prevention:   safety round/check completed   fall prevention program maintained  Taken 7/29/2023 0000 by Alondra Johnson RN  Safety Promotion/Fall Prevention:   safety round/check completed   fall prevention program maintained  Taken 7/28/2023 2300 by Alondra Johnson RN  Safety Promotion/Fall Prevention:   safety round/check completed   fall prevention program maintained  Taken 7/28/2023 2200 by Alondra Johnson RN  Safety Promotion/Fall Prevention:   safety round/check completed   fall prevention program maintained  Taken 7/28/2023 2100 by Alondra Johnson RN  Safety Promotion/Fall Prevention:   safety round/check completed   fall prevention program maintained  Taken 7/28/2023 2000 by Alondra Johnson RN  Safety Promotion/Fall Prevention:   safety round/check completed   fall prevention program maintained  Taken 7/28/2023 1900 by Alondra Johnson RN  Safety Promotion/Fall Prevention:   safety round/check completed   fall prevention program maintained  Intervention: Prevent Skin Injury  Recent Flowsheet Documentation  Taken 7/29/2023 0200 by Alondra Johnson RN  Body Position: position changed independently  Taken 7/29/2023 0000 by Alondra Johnson RN  Body Position: position changed  independently  Taken 7/28/2023 2000 by Alondra Johnson RN  Body Position: position changed independently  Intervention: Prevent and Manage VTE (Venous Thromboembolism) Risk  Recent Flowsheet Documentation  Taken 7/29/2023 0200 by Alondra Johnson RN  Range of Motion: active ROM (range of motion) encouraged  Taken 7/28/2023 2000 by Alondra Johnson RN  VTE Prevention/Management: patient refused intervention  Range of Motion: active ROM (range of motion) encouraged  Goal: Optimal Comfort and Wellbeing  Outcome: Ongoing, Progressing  Intervention: Provide Person-Centered Care  Recent Flowsheet Documentation  Taken 7/29/2023 0200 by Alondra Johnson RN  Trust Relationship/Rapport:   care explained   choices provided   thoughts/feelings acknowledged  Taken 7/28/2023 2000 by Alondra Johnson RN  Trust Relationship/Rapport:   care explained   choices provided   thoughts/feelings acknowledged  Goal: Readiness for Transition of Care  Outcome: Ongoing, Progressing     Problem: Skin Injury Risk Increased  Goal: Skin Health and Integrity  Outcome: Ongoing, Progressing  Intervention: Optimize Skin Protection  Recent Flowsheet Documentation  Taken 7/29/2023 0200 by Alondra Johnson RN  Head of Bed (HOB) Positioning: HOB elevated  Taken 7/29/2023 0000 by Alondra Johnson RN  Head of Bed (HOB) Positioning: HOB elevated  Taken 7/28/2023 2000 by Alondra Johnson RN  Head of Bed (HOB) Positioning: HOB elevated     Problem: Adjustment to Illness (Sepsis/Septic Shock)  Goal: Optimal Coping  Outcome: Ongoing, Progressing  Intervention: Optimize Psychosocial Adjustment to Illness  Recent Flowsheet Documentation  Taken 7/29/2023 0200 by Alondra Johnson RN  Family/Support System Care: support provided  Taken 7/28/2023 2000 by Alondra Johnson RN  Family/Support System Care: support provided     Problem: Bleeding (Sepsis/Septic Shock)  Goal: Absence of Bleeding  Outcome: Ongoing, Progressing     Problem: Glycemic Control Impaired (Sepsis/Septic  Shock)  Goal: Blood Glucose Level Within Desired Range  Outcome: Ongoing, Progressing     Problem: Infection Progression (Sepsis/Septic Shock)  Goal: Absence of Infection Signs and Symptoms  Outcome: Ongoing, Progressing     Problem: Nutrition Impaired (Sepsis/Septic Shock)  Goal: Optimal Nutrition Intake  Outcome: Ongoing, Progressing

## 2023-07-29 NOTE — PROGRESS NOTES
"    [facility] HOSPITALIST PROGRESS NOTE     Patient Identification:  Name:  Edvin Jiménez  Age:  52 y.o.  Sex:  male  :  1970  MRN:  [id number]  Visit Number:  [id number]  ROOM: [address]     Primary Care Provider:  Provider, No Known    Length of stay in inpatient status:  2    Subjective     Chief Compliant:    Chief Complaint   Patient presents with    Alcohol Problem       History of Presenting Illness:    Patient seen in follow-up for alcohol withdrawal and new ESBL E. coli bacteremia.  Patient is awake, alert and oriented x4 this morning.  He has no active signs of withdrawal.  Tmax 100.7 overnight.  Overnight there were several issues with patient's behavior.  He was aggressive and verbally abusive to staff, cursing them, racial slurs and required security at the bedside.  He has requested \"detox \"medications and pretends to be \"shaking \"in order to score higher on CIWA however after leaving the room and observing on video, all of patient's \"symptoms \"suddenly resolved.    Objective     Current Hospital Meds:aspirin, 81 mg, Oral, Daily  atorvastatin, 40 mg, Oral, Nightly  carvedilol, 6.25 mg, Oral, BID With Meals  [START ON 2023] chlordiazePOXIDE, 10 mg, Oral, BID  [START ON 2023] chlordiazePOXIDE, 10 mg, Oral, Once  chlordiazePOXIDE, 25 mg, Oral, Q8H  [START ON 2023] chlordiazePOXIDE, 25 mg, Oral, BID  enoxaparin, 40 mg, Subcutaneous, Nightly  ertapenem, 1,000 mg, Intravenous, Q24H  folic acid (FOLVITE) IVPB, 1 mg, Intravenous, Daily  [START ON 2023] losartan, 100 mg, Oral, Q24H  senna-docusate sodium, 2 tablet, Oral, BID  sodium chloride, 10 mL, Intravenous, Q12H  sodium chloride, 10 mL, Intravenous, Q12H  thiamine (B-1) IV, 200 mg, Intravenous, Q8H   Followed by  [START ON 2023] thiamine, 100 mg, Oral, Daily           Current Antimicrobial Therapy:  Anti-Infectives (From admission, onward)      Ordered     Dose/Rate Route Frequency Start Stop    23 0444  " ertapenem (INVanz) 1,000 mg in sodium chloride 0.9 % 100 mL IVPB-VTB        Ordering Provider: Liyah Manriquez APRN    1,000 mg  200 mL/hr over 30 Minutes Intravenous Every 24 Hours 07/28/23 0800 08/11/23 0759    07/27/23 1146  piperacillin-tazobactam (ZOSYN) IVPB 3.375 g in 100 mL NS VTB        Ordering Provider: Colby Lam DO    3.375 g  over 30 Minutes Intravenous Once 07/27/23 1202 07/27/23 1330          Current Diuretic Therapy:  Diuretics (From admission, onward)      None          ----------------------------------------------------------------------------------------------------------------------  Vital Signs:  Temp:  [97.1 øF (36.2 øC)-100.7 øF (38.2 øC)] 97.1 øF (36.2 øC)  Heart Rate:  [58-95] 85  Resp:  [14-20] 18  BP: (137-191)/() 173/121  SpO2:  [70 %-100 %] 97 %  on   ;   Device (Oxygen Therapy): room air  Body mass index is 20.41 kg/mý.    Wt Readings from Last 3 Encounters:   07/28/23 62.7 kg (138 lb 3.7 oz)     Intake & Output (last 3 days)         07/26 0701 07/27 0700 07/27 0701 07/28 0700 07/28 0701 07/29 0700 07/29 0701 07/30 0700    P.O.   576     I.V. (mL/kg)  1475 (23.5) 2964.6 (47.3)     IV Piggyback  2998 50 50    Total Intake(mL/kg)  4473 (71.3) 3590.6 (57.3) 50 (0.8)    Urine (mL/kg/hr)  1850 2850 (1.9)     Stool  0      Total Output  1850 2850     Net  +2623 +740.6 +50            Urine Unmeasured Occurrence   3 x     Stool Unmeasured Occurrence  1 x            Diet: Regular/House Diet; Texture: Regular Texture (IDDSI 7); Fluid Consistency: Thin (IDDSI 0)  ----------------------------------------------------------------------------------------------------------------------  Physical exam:  Constitutional: Older -American male, nontoxic, disheveled appearing, Well-developed and well-nourished, resting comfortably in bed, no acute distress.      HENT:  Head:  Normocephalic and atraumatic.  Mouth:  Moist mucous membranes.    Eyes:  Conjunctivae and EOM are normal. No  scleral icterus.   Cardiovascular:  Normal rate, regular rhythm and normal heart sounds with no murmur. No JVD.   Pulmonary/Chest:  No respiratory distress, no wheezes, no crackles, with normal breath sounds and good air movement. Unlabored. No accessory muscle use.  Abdominal:  Soft. No distension and no tenderness.  Bowel sounds present. No rebound or guarding.   Musculoskeletal:  No tenderness, and no deformity.  No red or swollen joints anywhere.    Neurological:  Alert and oriented to person, place, and time.  No cranial nerve deficit.   Nonfocal.   Skin:  Skin is warm and dry. No rash noted. No pallor.   Peripheral vascular:  No clubbing, no cyanosis, no edema. Pedal and tibial pulses 2 out of 4 bilaterally.     ----------------------------------------------------------------------------------------------------------------------  Results from last 7 days   Lab Units 07/29/23 0018 07/28/23 0004 07/27/23 1852 07/27/23  1530 07/27/23  1236 07/27/23  1108   LACTATE mmol/L  --   --  1.6 3.0* 2.3*  --    WBC 10*3/mm3 5.93 7.12  --   --   --  6.68   HEMOGLOBIN g/dL 8.0* 9.5*  --   --   --  10.1*   HEMATOCRIT % 25.3* 30.6*  --   --   --  31.3*   MCV fL 80.6 81.2  --   --   --  78.3*   MCHC g/dL 31.6 31.0*  --   --   --  32.3   PLATELETS 10*3/mm3 95* 97*  --   --   --  128*   INR   --   --   --   --  1.02  --          Results from last 7 days   Lab Units 07/29/23 0018 07/28/23 0004 07/27/23 1852 07/27/23  1108   SODIUM mmol/L 135* 133*  --  133*   POTASSIUM mmol/L 4.2 3.7  --  4.0   MAGNESIUM mg/dL  --  2.5 1.4* 1.2*   CHLORIDE mmol/L 101 96*  --  92*   CO2 mmol/L 25.0 23.9  --  29.0   BUN mg/dL 11 10  --  8   CREATININE mg/dL 1.02 1.01  --  0.87   CALCIUM mg/dL 8.1* 7.8*  --  8.7   PHOSPHORUS mg/dL  --  4.0 4.4  --    GLUCOSE mg/dL 133* 173*  --  128*   ALBUMIN g/dL  --   --   --  3.6   BILIRUBIN mg/dL  --   --   --  0.8   ALK PHOS U/L  --   --   --  190*   AST (SGOT) U/L  --   --   --  190*   ALT (SGPT) U/L  --    --   --  73*   Estimated Creatinine Clearance: 75.1 mL/min (by C-G formula based on SCr of 1.02 mg/dL).  No results found for: AMMONIA  Results from last 7 days   Lab Units 07/27/23  1236 07/27/23  1108   HSTROP T ng/L 22* 24*         Results from last 7 days   Lab Units 07/27/23  1236   CHOLESTEROL mg/dL 138   TRIGLYCERIDES mg/dL 53   HDL CHOL mg/dL 110*   LDL CHOL mg/dL 16     Hemoglobin A1C   Date/Time Value Ref Range Status   07/27/2023 1108 5.60 4.80 - 5.60 % Final     Lab Results   Component Value Date    TSH 1.120 07/27/2023     No results found for: PREGTESTUR, PREGSERUM, HCG, HCGQUANT  Pain Management Panel          Latest Ref Rng & Units 7/27/2023   Pain Management Panel   Creatinine, Urine mg/dL 47.4    Amphetamine, Urine Qual Negative Negative    Barbiturates Screen, Urine Negative Negative    Benzodiazepine Screen, Urine Negative Negative    Buprenorphine, Screen, Urine Negative Negative    Cocaine Screen, Urine Negative Negative    Fentanyl, Urine Negative Negative    Methadone Screen , Urine Negative Negative    Methamphetamine, Ur Negative Negative      Brief Urine Lab Results  (Last result in the past 365 days)        Color   Clarity   Blood   Leuk Est   Nitrite   Protein   CREAT   Urine HCG        07/27/23 1124             47.4         07/27/23 1124 Red  Comment: Dipstick results may be inaccurate due to color interference.       Turbid   Large (3+)   Large (3+)   Negative   100 mg/dL (2+)                 Blood Culture   Date Value Ref Range Status   07/27/2023 Abnormal Stain (C)  Preliminary   07/27/2023 Abnormal Stain (C)  Preliminary     Urine Culture   Date Value Ref Range Status   07/27/2023 >100,000 CFU/mL Gram Negative Bacilli (A)  Preliminary     No results found for: WOUNDCX  No results found for: STOOLCX  No results found for: RESPCX  No results found for: AFBCX  Results from last 7 days   Lab Units 07/27/23  1852 07/27/23  1530 07/27/23  1236   PROCALCITONIN ng/mL  --   --  0.39*  "  LACTATE mmol/L 1.6 3.0* 2.3*       I have personally looked at the labs and they are summarized above.  ----------------------------------------------------------------------------------------------------------------------  Detailed radiology reports for the last 24 hours:  Imaging Results (Last 24 Hours)       Procedure Component Value Units Date/Time    US Liver [[id number]] Resulted: 07/28/23 1920     Updated: 07/28/23 1920          Assessment & Plan      Patient is a 52-year-old -American male with history significant for chronic alcohol abuse and hypertension who presented to the ER for alcohol detox.    #Acute alcohol withdrawal, exaggerated for secondary gain  #Drug-seeking behavior  #Elevated transaminases  #Chronic pancreatitis  --Patient presented to the ER for alcohol detox.  Patient stated he usually drinks \"6 quarts\" of Melquiades Beam or \"whatever I can get \". Started drinking heavily approximately 2 years ago.  Last drink on 7/26-allegedly  --Admit serum alcohol negative  --Labs: AST//73, T. bili 0.8, alk phos 190  --CT w/out evidence of cirrhosis  --Tolerating p.o. diet without any issue-despite repetitive complaints of abdominal pain requesting specifically IV morphine or Dilaudid-this happened last night on 7/28 after he consumed 3 turkey boxes without any nausea or vomiting  --DC IV fluids  --liver US ordered to r/o cirrhosis  --I have discontinued IV Ativan for CIWA, switch to p.o. Librium taper, continue thiamine, multivitamins and folate  --Patient has been scoring very low on CIWA, his \"symptoms \"are exaggerated and seem to occur when ever nursing enters the room.  On my evaluations, I see no evidence to support any active withdrawal or concern for impending withdrawal.  I believe his story is over exaggerated and he is merely drug-seeking.  Several issues with his behavior overnight became aggressive and verbally abusive requiring security at the bedside-accusing nursing and all staff " of being racist (see multiple documents on the chart).  If patient did not have ESBL bacteremia requiring antibiotics and further care, I would have already discharged him this morning.  He was instructed that this behavior would not be tolerated and if this continues to be an issue, will entertain other avenues on dispo planning  --Per above, I do not believe patient requires CCU monitoring, he can be stepdown to [facility] to continue treatment for bacteremia    #Sepsis due to ESBL E. coli bacteremia  #Urinary tract infection ESBL E. coli, POA  --Initial Tmax 101 in the ER, initially suspected to be withdrawal fever however cultures quickly grew out ESBL E. coli.  Tmax overnight 100.7.  --Admit labs: Lactic 2.3, Pro-Quang 0.4  --CT abdomen/pelvis noted cystitis  --UA with 3+ hematuria, 3+ leuks, +1 g protein but no bacteria?  --Urine culture ESBL E. coli  --Blood cultures 2/2 positive for ESBL E. Coli  --Repeat culture 7/28 NGTD  --midline ordered   --Continue ertapenem, will require 2-week IV course, will require midline placement and dispo plan given social status  --Infectious disease following, appreciate recommendations  --Given need for 2-week IV antibiotic course, will see if he is a candidate for [facility]    #Microcytic anemia, suspect chronic  #Thrombocytopenia  --Hemoglobin 10.1, MCV 78, suspect chronic but no prior labs available.  Thrombocytopenia likely multifactorial due to alcohol abuse versus sepsis mediated due to ESBL bacteremia  --Hemoglobin 8 this a.m., only form of ABL was hematuria  --Repeat labs in a.m., no indication for transfusion    #Hypertensive urgency, POA  --Likely has hypertension at baseline and is untreated due to medication none compliance and nonadherence  --Continue Coreg to 6.25 twice daily + losartan (proteinuria), as needed labetalol, will adjust p.o. antihypertensive regimen as needed    #NSTEMI, type 2  #Atypical chest pain  --due to the above. Patient c/0 of atypical chest pain on  admission, asymptomatic since  --Initial troponin 24, repeat 22 with a -2 delta   --EKG noted NSR without acute ST or T wave changes  --Echocardiogram noted an EF of 51 to 55%  --Continue beta-blocker, aspirin/statin, ARB  --Outpatient cardiology follow-up    #Pulmonary nodule, needs outpatient follow-up in 3 months  #Proteinuria, protein creatinine ratio 1.5 g, continue new losartan  #Ascending aortic ectasia 3.9 cm noted on CT angiogram, outpatient follow-up  #HCV antibody positive, RNA quant sent  #Chronic pancreatitis due to EtOH use, supportive care  #Hypovolemic hyponatremia, not clinically significant  #Hypomagnesemia, replace per protocol    CHECKLIST:  Abx: Invanz  VTE: Lovenox  GI ppx: PPI  Diet: Consistent carb  Code: CPR, full  Dispo: Patient is medically stable, requires 2 weeks of IV antibiotics for ESBL E. coli bacteremia.  He is not in acute alcohol withdrawal.  He can stepdown to [facility].  Issue will be with dispo, will see if he is a [facility] candidate as he is homeless and unable to dispo home with IV antibiotics.     Christian Chavez,   [facility] Hospitalist  07/29/23  12:28 EDT

## 2023-07-30 LAB
ANION GAP SERPL CALCULATED.3IONS-SCNC: 7.7 MMOL/L (ref 5–15)
BACTERIA SPEC AEROBE CULT: ABNORMAL
BACTERIA SPEC AEROBE CULT: ABNORMAL
BASOPHILS # BLD AUTO: 0.03 10*3/MM3 (ref 0–0.2)
BASOPHILS NFR BLD AUTO: 0.8 % (ref 0–1.5)
BUN SERPL-MCNC: 10 MG/DL (ref 6–20)
BUN/CREAT SERPL: 11.5 (ref 7–25)
CALCIUM SPEC-SCNC: 8.4 MG/DL (ref 8.6–10.5)
CHLORIDE SERPL-SCNC: 103 MMOL/L (ref 98–107)
CO2 SERPL-SCNC: 27.3 MMOL/L (ref 22–29)
CREAT SERPL-MCNC: 0.87 MG/DL (ref 0.76–1.27)
DEPRECATED RDW RBC AUTO: 57.1 FL (ref 37–54)
EGFRCR SERPLBLD CKD-EPI 2021: 103.8 ML/MIN/1.73
EOSINOPHIL # BLD AUTO: 0.05 10*3/MM3 (ref 0–0.4)
EOSINOPHIL NFR BLD AUTO: 1.3 % (ref 0.3–6.2)
ERYTHROCYTE [DISTWIDTH] IN BLOOD BY AUTOMATED COUNT: 19.3 % (ref 12.3–15.4)
GLUCOSE SERPL-MCNC: 111 MG/DL (ref 65–99)
GRAM STN SPEC: ABNORMAL
HCT VFR BLD AUTO: 26.4 % (ref 37.5–51)
HGB BLD-MCNC: 8.2 G/DL (ref 13–17.7)
IMM GRANULOCYTES # BLD AUTO: 0.02 10*3/MM3 (ref 0–0.05)
IMM GRANULOCYTES NFR BLD AUTO: 0.5 % (ref 0–0.5)
ISOLATED FROM: ABNORMAL
ISOLATED FROM: ABNORMAL
LYMPHOCYTES # BLD AUTO: 0.9 10*3/MM3 (ref 0.7–3.1)
LYMPHOCYTES NFR BLD AUTO: 23.1 % (ref 19.6–45.3)
MCH RBC QN AUTO: 25.4 PG (ref 26.6–33)
MCHC RBC AUTO-ENTMCNC: 31.1 G/DL (ref 31.5–35.7)
MCV RBC AUTO: 81.7 FL (ref 79–97)
MONOCYTES # BLD AUTO: 0.55 10*3/MM3 (ref 0.1–0.9)
MONOCYTES NFR BLD AUTO: 14.1 % (ref 5–12)
NEUTROPHILS NFR BLD AUTO: 2.34 10*3/MM3 (ref 1.7–7)
NEUTROPHILS NFR BLD AUTO: 60.2 % (ref 42.7–76)
NRBC BLD AUTO-RTO: 0 /100 WBC (ref 0–0.2)
PLATELET # BLD AUTO: 100 10*3/MM3 (ref 140–450)
PMV BLD AUTO: 9.6 FL (ref 6–12)
POTASSIUM SERPL-SCNC: 4.2 MMOL/L (ref 3.5–5.2)
RBC # BLD AUTO: 3.23 10*6/MM3 (ref 4.14–5.8)
SODIUM SERPL-SCNC: 138 MMOL/L (ref 136–145)
WBC NRBC COR # BLD: 3.89 10*3/MM3 (ref 3.4–10.8)

## 2023-07-30 PROCEDURE — 99232 SBSQ HOSP IP/OBS MODERATE 35: CPT | Performed by: NURSE PRACTITIONER

## 2023-07-30 PROCEDURE — 63710000001 ONDANSETRON ODT 4 MG TABLET DISPERSIBLE: Performed by: STUDENT IN AN ORGANIZED HEALTH CARE EDUCATION/TRAINING PROGRAM

## 2023-07-30 PROCEDURE — 63710000001 PROMETHAZINE PER 25 MG: Performed by: STUDENT IN AN ORGANIZED HEALTH CARE EDUCATION/TRAINING PROGRAM

## 2023-07-30 PROCEDURE — 25010000002 ENOXAPARIN PER 10 MG: Performed by: STUDENT IN AN ORGANIZED HEALTH CARE EDUCATION/TRAINING PROGRAM

## 2023-07-30 PROCEDURE — 80048 BASIC METABOLIC PNL TOTAL CA: CPT | Performed by: STUDENT IN AN ORGANIZED HEALTH CARE EDUCATION/TRAINING PROGRAM

## 2023-07-30 PROCEDURE — 99232 SBSQ HOSP IP/OBS MODERATE 35: CPT | Performed by: STUDENT IN AN ORGANIZED HEALTH CARE EDUCATION/TRAINING PROGRAM

## 2023-07-30 PROCEDURE — 25010000002 ERTAPENEM PER 500 MG: Performed by: NURSE PRACTITIONER

## 2023-07-30 PROCEDURE — 25010000002 THIAMINE PER 100 MG: Performed by: STUDENT IN AN ORGANIZED HEALTH CARE EDUCATION/TRAINING PROGRAM

## 2023-07-30 PROCEDURE — 85025 COMPLETE CBC W/AUTO DIFF WBC: CPT | Performed by: STUDENT IN AN ORGANIZED HEALTH CARE EDUCATION/TRAINING PROGRAM

## 2023-07-30 RX ORDER — PROMETHAZINE HYDROCHLORIDE 25 MG/1
25 TABLET ORAL EVERY 6 HOURS PRN
Status: DISCONTINUED | OUTPATIENT
Start: 2023-07-30 | End: 2023-08-10 | Stop reason: HOSPADM

## 2023-07-30 RX ORDER — NIFEDIPINE 30 MG/1
30 TABLET, FILM COATED, EXTENDED RELEASE ORAL
Status: DISCONTINUED | OUTPATIENT
Start: 2023-07-30 | End: 2023-08-10 | Stop reason: HOSPADM

## 2023-07-30 RX ORDER — LACTULOSE 10 G/15ML
10 SOLUTION ORAL 2 TIMES DAILY
Status: DISCONTINUED | OUTPATIENT
Start: 2023-07-30 | End: 2023-07-30

## 2023-07-30 RX ORDER — BISACODYL 5 MG/1
5 TABLET, DELAYED RELEASE ORAL DAILY PRN
Status: DISCONTINUED | OUTPATIENT
Start: 2023-07-30 | End: 2023-08-10 | Stop reason: HOSPADM

## 2023-07-30 RX ORDER — POLYETHYLENE GLYCOL 3350 17 G/17G
17 POWDER, FOR SOLUTION ORAL DAILY PRN
Status: DISCONTINUED | OUTPATIENT
Start: 2023-07-30 | End: 2023-08-10 | Stop reason: HOSPADM

## 2023-07-30 RX ORDER — LACTULOSE 10 G/15ML
10 SOLUTION ORAL 2 TIMES DAILY PRN
Status: DISCONTINUED | OUTPATIENT
Start: 2023-07-30 | End: 2023-08-10 | Stop reason: HOSPADM

## 2023-07-30 RX ORDER — ONDANSETRON 4 MG/1
4 TABLET, ORALLY DISINTEGRATING ORAL EVERY 6 HOURS PRN
Status: DISCONTINUED | OUTPATIENT
Start: 2023-07-30 | End: 2023-08-10 | Stop reason: HOSPADM

## 2023-07-30 RX ORDER — AMOXICILLIN 250 MG
2 CAPSULE ORAL 2 TIMES DAILY PRN
Status: DISCONTINUED | OUTPATIENT
Start: 2023-07-30 | End: 2023-08-10 | Stop reason: HOSPADM

## 2023-07-30 RX ORDER — BISACODYL 10 MG
10 SUPPOSITORY, RECTAL RECTAL DAILY PRN
Status: DISCONTINUED | OUTPATIENT
Start: 2023-07-30 | End: 2023-08-10 | Stop reason: HOSPADM

## 2023-07-30 RX ADMIN — TRAMADOL HYDROCHLORIDE 50 MG: 50 TABLET, COATED ORAL at 16:00

## 2023-07-30 RX ADMIN — IBUPROFEN 800 MG: 400 TABLET, FILM COATED ORAL at 20:47

## 2023-07-30 RX ADMIN — DOCUSATE SODIUM 50 MG AND SENNOSIDES 8.6 MG 2 TABLET: 8.6; 5 TABLET, FILM COATED ORAL at 20:47

## 2023-07-30 RX ADMIN — FOLIC ACID 1 MG: 5 INJECTION, SOLUTION INTRAMUSCULAR; INTRAVENOUS; SUBCUTANEOUS at 11:10

## 2023-07-30 RX ADMIN — ACETAMINOPHEN 650 MG: 325 TABLET ORAL at 16:00

## 2023-07-30 RX ADMIN — Medication 10 ML: at 08:05

## 2023-07-30 RX ADMIN — HYDRALAZINE HYDROCHLORIDE 25 MG: 25 TABLET, FILM COATED ORAL at 16:00

## 2023-07-30 RX ADMIN — CHLORDIAZEPOXIDE HYDROCHLORIDE 25 MG: 25 CAPSULE ORAL at 08:04

## 2023-07-30 RX ADMIN — NIFEDIPINE 30 MG: 30 TABLET, EXTENDED RELEASE ORAL at 17:03

## 2023-07-30 RX ADMIN — ASPIRIN 81 MG: 81 TABLET, CHEWABLE ORAL at 08:03

## 2023-07-30 RX ADMIN — Medication 10 ML: at 20:47

## 2023-07-30 RX ADMIN — THIAMINE HYDROCHLORIDE 200 MG: 100 INJECTION, SOLUTION INTRAMUSCULAR; INTRAVENOUS at 16:00

## 2023-07-30 RX ADMIN — CARVEDILOL 6.25 MG: 6.25 TABLET, FILM COATED ORAL at 17:03

## 2023-07-30 RX ADMIN — ONDANSETRON 4 MG: 4 TABLET, ORALLY DISINTEGRATING ORAL at 17:03

## 2023-07-30 RX ADMIN — THIAMINE HYDROCHLORIDE 200 MG: 100 INJECTION, SOLUTION INTRAMUSCULAR; INTRAVENOUS at 20:48

## 2023-07-30 RX ADMIN — CHLORDIAZEPOXIDE HYDROCHLORIDE 25 MG: 25 CAPSULE ORAL at 20:50

## 2023-07-30 RX ADMIN — LOSARTAN POTASSIUM 100 MG: 50 TABLET, FILM COATED ORAL at 08:03

## 2023-07-30 RX ADMIN — TRAMADOL HYDROCHLORIDE 50 MG: 50 TABLET, COATED ORAL at 05:33

## 2023-07-30 RX ADMIN — ENOXAPARIN SODIUM 40 MG: 40 INJECTION SUBCUTANEOUS at 20:47

## 2023-07-30 RX ADMIN — ATORVASTATIN CALCIUM 40 MG: 40 TABLET, FILM COATED ORAL at 20:47

## 2023-07-30 RX ADMIN — PROMETHAZINE HYDROCHLORIDE 25 MG: 25 TABLET ORAL at 20:47

## 2023-07-30 RX ADMIN — THIAMINE HYDROCHLORIDE 200 MG: 100 INJECTION, SOLUTION INTRAMUSCULAR; INTRAVENOUS at 05:33

## 2023-07-30 RX ADMIN — CARVEDILOL 6.25 MG: 6.25 TABLET, FILM COATED ORAL at 08:04

## 2023-07-30 RX ADMIN — ERTAPENEM SODIUM 1000 MG: 1 INJECTION INTRAMUSCULAR; INTRAVENOUS at 08:04

## 2023-07-30 NOTE — PROGRESS NOTES
PROGRESS NOTE         Patient Identification:  Name:  Edvin Jiménez  Age:  52 y.o.  Sex:  male  :  1970  MRN:  [id number]  Visit Number:  [id number]  Primary Care Provider:  Provider, No Known         LOS: 3 days       ----------------------------------------------------------------------------------------------------------------------  Subjective       Chief Complaints:    Alcohol Problem        Interval History:      Patient resting comfortably in bed this morning.  No issues or complaints.  Currently on room air with no apparent distress.  Reports improvement in left-sided flank pain.  Lungs clear to auscultation bilaterally.  Abdomen soft, nontender.  WBC normal at 3.89.  Blood cultures from 2023 show no growth thus far.    Review of Systems:    Constitutional: no fever, chills and night sweats.  Generalized fatigue.  Eyes: no eye drainage, itching or redness.  HEENT: no mouth sores, dysphagia or nose bleed.  Respiratory: no for shortness of breath, cough or production of sputum.  Cardiovascular: no chest pain, no palpitations, no orthopnea.  Gastrointestinal: no nausea, vomiting or diarrhea. No abdominal pain, hematemesis or rectal bleeding.  Genitourinary: no dysuria or polyuria.  Hematologic/lymphatic: no lymph node abnormalities, no easy bruising or easy bleeding.  Musculoskeletal: no muscle or joint pain.  Skin: No rash and no itching.  Neurological: no loss of consciousness, no seizure, no headache.  Behavioral/Psych: no depression or suicidal ideation.  Endocrine: no hot flashes.  Immunologic: negative.    ----------------------------------------------------------------------------------------------------------------------      Objective       [facility] Meds:  aspirin, 81 mg, Oral, Daily  atorvastatin, 40 mg, Oral, Nightly  carvedilol, 6.25 mg, Oral, BID With Meals  [START ON 2023] chlordiazePOXIDE, 10 mg, Oral, BID  [START ON 2023] chlordiazePOXIDE, 10 mg,  Oral, Once  chlordiazePOXIDE, 25 mg, Oral, BID  enoxaparin, 40 mg, Subcutaneous, Nightly  ertapenem, 1,000 mg, Intravenous, Q24H  folic acid (FOLVITE) IVPB, 1 mg, Intravenous, Daily  losartan, 100 mg, Oral, Q24H  sodium chloride, 10 mL, Intravenous, Q12H  sodium chloride, 10 mL, Intravenous, Q12H  thiamine (B-1) IV, 200 mg, Intravenous, Q8H   Followed by  [START ON 8/1/2023] thiamine, 100 mg, Oral, Daily         ----------------------------------------------------------------------------------------------------------------------    Vital Signs:  Temp:  [97.1 øF (36.2 øC)-98.5 øF (36.9 øC)] 98 øF (36.7 øC)  Heart Rate:  [] 72  Resp:  [16-18] 18  BP: (157-185)/() 174/117  Mean Arterial Pressure (Non-Invasive) for the past 24 hrs (Last 3 readings):   Noninvasive MAP (mmHg)   07/30/23 0700 149   07/29/23 2054 140   07/29/23 1524 151       SpO2 Percentage    07/29/23 2311 07/30/23 0300 07/30/23 0700   SpO2: 91% 96% 97%     SpO2:  [91 %-99 %] 97 %  on   ;   Device (Oxygen Therapy): room air    Body mass index is 20.45 kg/mý.  Wt Readings from Last 3 Encounters:   07/30/23 62.8 kg (138 lb 8 oz)        Intake/Output Summary (Last 24 hours) at 7/30/2023 1027  Last data filed at 7/29/2023 1524  Gross per 24 hour   Intake 462 ml   Output --   Net 462 ml       Diet: Regular/House Diet; Texture: Regular Texture (IDDSI 7); Fluid Consistency: Thin (IDDSI 0)  ----------------------------------------------------------------------------------------------------------------------      Physical Exam:    Constitutional:  Well-developed and well-nourished.  -American male.  Currently on room air with no apparent distress.  HENT:  Head: Normocephalic and atraumatic.  Mouth:  Moist mucous membranes.    Eyes:  Conjunctivae and EOM are normal.  No scleral icterus.  Neck:  Neck supple.  No JVD present.    Cardiovascular:  Normal rate, regular rhythm and normal heart sounds with no murmur. No edema.  Pulmonary/Chest:  No  respiratory distress, no wheezes, no crackles, with normal breath sounds and good air movement.  Abdominal:  Soft.  Bowel sounds are normal.  No distension and no tenderness.  Improving left-sided flank pain  Musculoskeletal:  No edema, no tenderness, and no deformity.  No swelling or redness of joints.  Neurological:  Alert and oriented to person, place, and time.  No facial droop.  No slurred speech.   Skin:  Skin is warm and dry.  No rash noted.  No pallor.   Psychiatric:  Normal mood and affect.  Behavior is normal.        ----------------------------------------------------------------------------------------------------------------------  Results from last 7 days   Lab Units 07/27/23  1236 07/27/23  1108   HSTROP T ng/L 22* 24*         Results from last 7 days   Lab Units 07/27/23  1236   CHOLESTEROL mg/dL 138   TRIGLYCERIDES mg/dL 53   HDL CHOL mg/dL 110*   LDL CHOL mg/dL 16         Results from last 7 days   Lab Units 07/30/23  0405 07/29/23  0018 07/28/23  0004 07/27/23  1852 07/27/23  1530 07/27/23  1236   LACTATE mmol/L  --   --   --  1.6 3.0* 2.3*   WBC 10*3/mm3 3.89 5.93 7.12  --   --   --    HEMOGLOBIN g/dL 8.2* 8.0* 9.5*  --   --   --    HEMATOCRIT % 26.4* 25.3* 30.6*  --   --   --    MCV fL 81.7 80.6 81.2  --   --   --    MCHC g/dL 31.1* 31.6 31.0*  --   --   --    PLATELETS 10*3/mm3 100* 95* 97*  --   --   --    INR   --   --   --   --   --  1.02       Results from last 7 days   Lab Units 07/30/23  0405 07/29/23  0018 07/28/23  0004 07/27/23  1852 07/27/23  1108   SODIUM mmol/L 138 135* 133*  --  133*   POTASSIUM mmol/L 4.2 4.2 3.7  --  4.0   MAGNESIUM mg/dL  --   --  2.5 1.4* 1.2*   CHLORIDE mmol/L 103 101 96*  --  92*   CO2 mmol/L 27.3 25.0 23.9  --  29.0   BUN mg/dL 10 11 10  --  8   CREATININE mg/dL 0.87 1.02 1.01  --  0.87   CALCIUM mg/dL 8.4* 8.1* 7.8*  --  8.7   GLUCOSE mg/dL 111* 133* 173*  --  128*   ALBUMIN g/dL  --   --   --   --  3.6   BILIRUBIN mg/dL  --   --   --   --  0.8   ALK PHOS  U/L  --   --   --   --  190*   AST (SGOT) U/L  --   --   --   --  190*   ALT (SGPT) U/L  --   --   --   --  73*     Estimated Creatinine Clearance: 88.2 mL/min (by C-G formula based on SCr of 0.87 mg/dL).  No results found for: AMMONIA    Hemoglobin A1C   Date/Time Value Ref Range Status   07/27/2023 1108 5.60 4.80 - 5.60 % Final     Lab Results   Component Value Date    HGBA1C 5.60 07/27/2023     Lab Results   Component Value Date    TSH 1.120 07/27/2023       Blood Culture   Date Value Ref Range Status   07/27/2023 Gram Negative Bacilli (C)  Preliminary   07/27/2023 Gram Negative Bacilli (C)  Preliminary     Urine Culture   Date Value Ref Range Status   07/27/2023 >100,000 CFU/mL Escherichia coli ESBL (A)  Final     Comment:       Consider infectious disease consult.  Susceptibility results may not correlate to clinical outcomes.     No results found for: WOUNDCX  No results found for: STOOLCX  No results found for: RESPCX  Pain Management Panel          Latest Ref Rng & Units 7/27/2023   Pain Management Panel   Creatinine, Urine mg/dL 47.4    Amphetamine, Urine Qual Negative Negative    Barbiturates Screen, Urine Negative Negative    Benzodiazepine Screen, Urine Negative Negative    Buprenorphine, Screen, Urine Negative Negative    Cocaine Screen, Urine Negative Negative    Fentanyl, Urine Negative Negative    Methadone Screen , Urine Negative Negative    Methamphetamine, Ur Negative Negative        ----------------------------------------------------------------------------------------------------------------------  Imaging Results (Last 24 Hours)       Procedure Component Value Units Date/Time    US Liver [[id number]] Collected: 07/30/23 0753     Updated: 07/30/23 0756    Narrative:      EXAM:    US Abdomen Limited, Right Upper Quadrant     EXAM DATE:    7/28/2023 7:20 PM     CLINICAL HISTORY:    r/o cirrhosis; A41.9-Sepsis, unspecified organism; N30.90-Cystitis,  unspecified without hematuria; F10.939-Alcohol  use, unspecified with  withdrawal, unspecified     TECHNIQUE:    Real-time ultrasound of the right upper quadrant with image  documentation.     COMPARISON:    No relevant prior studies available.     FINDINGS:    Liver:  No mass.  No intrahepatic bile duct dilation.    Gallbladder:  Gallbladder not seen.    Common bile duct:  The CBD measures  .  No stones.  No dilation.   Common bile duct measures 0.2 cm in diameter.      Free fluid:  Tiny ascites surrounding the liver.       Impression:        Tiny ascites surrounding the liver.     This report was finalized on 7/30/2023 7:54 AM by Dr. Yordy Jean-Baptiste MD.               ----------------------------------------------------------------------------------------------------------------------    Pertinent Infectious Disease Results      Presented to [facility] Emergency Department on 7/27/2023 for chest pain and vomiting. Chlamydia and gonorrhea negative.  COVID-19 PCR negative.  Procalcitonin slightly elevated at 0.39.  Lactic acid elevated at 2.3 on admission.  Blood cultures from 7/27/2023 2 out of 2 sets positive for ESBL E. coli.  HIV negative.  Hepatitis C reactive.           Assessment/Plan       Assessment     Severe sepsis with lactic acid greater than 2 on admission  ESBL bacteremia  Possible cystitis        Plan      Patient resting comfortably in bed this morning.  No issues or complaints.  Currently on room air with no apparent distress.  Reports improvement in left-sided flank pain.  Lungs clear to auscultation bilaterally.  Abdomen soft, nontender.  WBC normal at 3.89.  Blood cultures from 7/28/2023 show no growth thus far.    Urine culture from 7/27/2023 finalizes greater than 100,000 colonies of E. coli ESBL.     Based on finalization of culture results patient require 2-week course of ertapenem 1 g IV every 24 hours for treatment of ESBL bacteremia with urinary source through 8/11/2023.  We will continue to follow closely and adjust antibiotic  therapy as needed.      ANTIMICROBIAL THERAPY    ertapenem (INVanz) 1000 mg in 100ml NS VTB     Code Status:   Code Status and Medical Interventions:   Ordered at: 07/27/23 1358     Code Status (Patient has no pulse and is not breathing):    CPR (Attempt to Resuscitate)     Medical Interventions (Patient has pulse or is breathing):    Full Support     Release to patient:    Routine Release       YISSEL Gómez  07/30/23  10:27 EDT

## 2023-07-30 NOTE — PLAN OF CARE
Goal Outcome Evaluation:      Patient is lying in bed with no S&S of distress. No complaints at this time. Will continue to follow plan of care.

## 2023-07-30 NOTE — PROGRESS NOTES
[facility] HOSPITALIST PROGRESS NOTE     Patient Identification:  Name:  Edvin Jiménez  Age:  52 y.o.  Sex:  male  :  1970  MRN:  [id number]  Visit Number:  [id number]  ROOM: [address] [place] [address]     Primary Care Provider:  Provider, No Known    Length of stay in inpatient status:  3    Subjective     Chief Compliant:    Chief Complaint   Patient presents with    Alcohol Problem       History of Presenting Illness:    Patient seen in follow-up for alcohol withdrawal and new ESBL E. coli bacteremia.  Patient is awake, alert and oriented x4 this morning.  He has no active signs of withdrawal.  Afebrile over the last 24 hours.  No significant issues from behavioral standpoint.  He denies any acute complaints this morning and has no questions or concerns.      Objective     Current Hospital Meds:aspirin, 81 mg, Oral, Daily  atorvastatin, 40 mg, Oral, Nightly  carvedilol, 6.25 mg, Oral, BID With Meals  [START ON 2023] chlordiazePOXIDE, 10 mg, Oral, BID  [START ON 2023] chlordiazePOXIDE, 10 mg, Oral, Once  chlordiazePOXIDE, 25 mg, Oral, BID  enoxaparin, 40 mg, Subcutaneous, Nightly  ertapenem, 1,000 mg, Intravenous, Q24H  folic acid (FOLVITE) IVPB, 1 mg, Intravenous, Daily  losartan, 100 mg, Oral, Q24H  sodium chloride, 10 mL, Intravenous, Q12H  sodium chloride, 10 mL, Intravenous, Q12H  thiamine (B-1) IV, 200 mg, Intravenous, Q8H   Followed by  [START ON 2023] thiamine, 100 mg, Oral, Daily           Current Antimicrobial Therapy:  Anti-Infectives (From admission, onward)      Ordered     Dose/Rate Route Frequency Start Stop    23 0444  ertapenem (INVanz) 1,000 mg in sodium chloride 0.9 % 100 mL IVPB-VTB        Ordering Provider: Liyah Manriquez APRN    1,000 mg  200 mL/hr over 30 Minutes Intravenous Every 24 Hours 23 0800 23 0759    23 1146  piperacillin-tazobactam (ZOSYN) IVPB 3.375 g in 100 mL NS VTB        Ordering Provider: Colby Lam DO    3.375 g  over 30  Minutes Intravenous Once 07/27/23 1202 07/27/23 1330          Current Diuretic Therapy:  Diuretics (From admission, onward)      None          ----------------------------------------------------------------------------------------------------------------------  Vital Signs:  Temp:  [97.1 øF (36.2 øC)-98.5 øF (36.9 øC)] 98 øF (36.7 øC)  Heart Rate:  [] 72  Resp:  [16-18] 18  BP: (157-185)/() 174/117  SpO2:  [91 %-99 %] 97 %  on   ;   Device (Oxygen Therapy): room air  Body mass index is 20.45 kg/mý.    Wt Readings from Last 3 Encounters:   07/30/23 62.8 kg (138 lb 8 oz)     Intake & Output (last 3 days)         07/27 0701 07/28 0700 07/28 0701 07/29 0700 07/29 0701 07/30 0700 07/30 0701 07/31 0700    P.O.  576 462     I.V. (mL/kg) 1475 (23.5) 2964.6 (47.3)      IV Piggyback 2998 50 50     Total Intake(mL/kg) 4473 (71.3) 3590.6 (57.3) 512 (8.2)     Urine (mL/kg/hr) 1850 2850 (1.9)      Stool 0       Total Output 1850 2850      Net +2623 +740.6 +512             Urine Unmeasured Occurrence  3 x 10 x     Stool Unmeasured Occurrence 1 x  3 x           Diet: Regular/House Diet; Texture: Regular Texture (IDDSI 7); Fluid Consistency: Thin (IDDSI 0)  ----------------------------------------------------------------------------------------------------------------------  Physical exam:  Constitutional: Older -American male, nontoxic, disheveled appearing, Well-developed and well-nourished, resting comfortably in bed, no acute distress.      HENT:  Head:  Normocephalic and atraumatic.  Mouth:  Moist mucous membranes.    Eyes:  Conjunctivae and EOM are normal. No scleral icterus.   Cardiovascular:  Normal rate, regular rhythm and normal heart sounds with no murmur. No JVD.   Pulmonary/Chest:  No respiratory distress, no wheezes, no crackles, with normal breath sounds and good air movement. Unlabored. No accessory muscle use.  Abdominal:  Soft. No distension and no tenderness.  Bowel sounds present. No  rebound or guarding.   Musculoskeletal:  No tenderness, and no deformity.  No red or swollen joints anywhere.    Neurological:  Alert and oriented to person, place, and time.  No cranial nerve deficit.   Nonfocal.   Skin:  Skin is warm and dry. No rash noted. No pallor.   Peripheral vascular:  No clubbing, no cyanosis, no edema. Pedal and tibial pulses 2 out of 4 bilaterally.     Unchanged from 7/29/2023  Electronically signed by Christian Chavez DO, 07/30/23, 10:51 AM EDT.      ----------------------------------------------------------------------------------------------------------------------  Results from last 7 days   Lab Units 07/30/23 0405 07/29/23  0018 07/28/23  0004 07/27/23  1852 07/27/23  1530 07/27/23  1236   LACTATE mmol/L  --   --   --  1.6 3.0* 2.3*   WBC 10*3/mm3 3.89 5.93 7.12  --   --   --    HEMOGLOBIN g/dL 8.2* 8.0* 9.5*  --   --   --    HEMATOCRIT % 26.4* 25.3* 30.6*  --   --   --    MCV fL 81.7 80.6 81.2  --   --   --    MCHC g/dL 31.1* 31.6 31.0*  --   --   --    PLATELETS 10*3/mm3 100* 95* 97*  --   --   --    INR   --   --   --   --   --  1.02         Results from last 7 days   Lab Units 07/30/23 0405 07/29/23  0018 07/28/23  0004 07/27/23  1852 07/27/23  1108   SODIUM mmol/L 138 135* 133*  --  133*   POTASSIUM mmol/L 4.2 4.2 3.7  --  4.0   MAGNESIUM mg/dL  --   --  2.5 1.4* 1.2*   CHLORIDE mmol/L 103 101 96*  --  92*   CO2 mmol/L 27.3 25.0 23.9  --  29.0   BUN mg/dL 10 11 10  --  8   CREATININE mg/dL 0.87 1.02 1.01  --  0.87   CALCIUM mg/dL 8.4* 8.1* 7.8*  --  8.7   PHOSPHORUS mg/dL  --   --  4.0 4.4  --    GLUCOSE mg/dL 111* 133* 173*  --  128*   ALBUMIN g/dL  --   --   --   --  3.6   BILIRUBIN mg/dL  --   --   --   --  0.8   ALK PHOS U/L  --   --   --   --  190*   AST (SGOT) U/L  --   --   --   --  190*   ALT (SGPT) U/L  --   --   --   --  73*   Estimated Creatinine Clearance: 88.2 mL/min (by C-G formula based on SCr of 0.87 mg/dL).  No results found for: AMMONIA  Results from last 7  days   Lab Units 07/27/23  1236 07/27/23  1108   HSTROP T ng/L 22* 24*         Results from last 7 days   Lab Units 07/27/23  1236   CHOLESTEROL mg/dL 138   TRIGLYCERIDES mg/dL 53   HDL CHOL mg/dL 110*   LDL CHOL mg/dL 16     Hemoglobin A1C   Date/Time Value Ref Range Status   07/27/2023 1108 5.60 4.80 - 5.60 % Final     Lab Results   Component Value Date    TSH 1.120 07/27/2023     No results found for: PREGTESTUR, PREGSERUM, HCG, HCGQUANT  Pain Management Panel          Latest Ref Rng & Units 7/27/2023   Pain Management Panel   Creatinine, Urine mg/dL 47.4    Amphetamine, Urine Qual Negative Negative    Barbiturates Screen, Urine Negative Negative    Benzodiazepine Screen, Urine Negative Negative    Buprenorphine, Screen, Urine Negative Negative    Cocaine Screen, Urine Negative Negative    Fentanyl, Urine Negative Negative    Methadone Screen , Urine Negative Negative    Methamphetamine, Ur Negative Negative      Brief Urine Lab Results  (Last result in the past 365 days)        Color   Clarity   Blood   Leuk Est   Nitrite   Protein   CREAT   Urine HCG        07/27/23 1124             47.4         07/27/23 1124 Red  Comment: Dipstick results may be inaccurate due to color interference.       Turbid   Large (3+)   Large (3+)   Negative   100 mg/dL (2+)                 Blood Culture   Date Value Ref Range Status   07/27/2023 Abnormal Stain (C)  Preliminary   07/27/2023 Abnormal Stain (C)  Preliminary     Urine Culture   Date Value Ref Range Status   07/27/2023 >100,000 CFU/mL Gram Negative Bacilli (A)  Preliminary     No results found for: WOUNDCX  No results found for: STOOLCX  No results found for: RESPCX  No results found for: AFBCX  Results from last 7 days   Lab Units 07/27/23  1852 07/27/23  1530 07/27/23  1236   PROCALCITONIN ng/mL  --   --  0.39*   LACTATE mmol/L 1.6 3.0* 2.3*       I have personally looked at the labs and they are summarized  above.  ----------------------------------------------------------------------------------------------------------------------  Detailed radiology reports for the last 24 hours:  Imaging Results (Last 24 Hours)       Procedure Component Value Units Date/Time    US Liver [[id number]] Collected: 07/30/23 0753     Updated: 07/30/23 0756    Narrative:      EXAM:    US Abdomen Limited, Right Upper Quadrant     EXAM DATE:    7/28/2023 7:20 PM     CLINICAL HISTORY:    r/o cirrhosis; A41.9-Sepsis, unspecified organism; N30.90-Cystitis,  unspecified without hematuria; F10.939-Alcohol use, unspecified with  withdrawal, unspecified     TECHNIQUE:    Real-time ultrasound of the right upper quadrant with image  documentation.     COMPARISON:    No relevant prior studies available.     FINDINGS:    Liver:  No mass.  No intrahepatic bile duct dilation.    Gallbladder:  Gallbladder not seen.    Common bile duct:  The CBD measures  .  No stones.  No dilation.   Common bile duct measures 0.2 cm in diameter.      Free fluid:  Tiny ascites surrounding the liver.       Impression:        Tiny ascites surrounding the liver.     This report was finalized on 7/30/2023 7:54 AM by Dr. Yordy Jean-Baptiste MD.             Assessment & Plan      Patient is a 52-year-old -American male with history significant for chronic alcohol abuse and hypertension who presented to the ER for alcohol detox.    #Sepsis due to ESBL E. coli bacteremia  #Urinary tract infection ESBL E. coli, POA  --Initial Tmax 101 in the ER, initially suspected to be withdrawal fever however cultures quickly grew out ESBL E. coli.  Tmax overnight 100.7.  --Admit labs: Lactic 2.3, Pro-Quang 0.4  --CT abdomen/pelvis noted cystitis  --UA with 3+ hematuria, 3+ leuks, +1 g protein but no bacteria?  --Urine culture ESBL E. coli  --Blood cultures 2/2 positive for ESBL E. Coli  --Repeat culture 7/28 NGTD  --midline ordered   --Continue ertapenem, will require 2-week IV course, will  "require midline placement and dispo plan given social status  --Infectious disease following, appreciate recommendations  --Given need for 2-week IV antibiotic course, will see if he is a candidate for [facility]    #Acute alcohol withdrawal, exaggerated for secondary gain  #Drug-seeking behavior  #Elevated transaminases  #Chronic pancreatitis  #Alcoholic cirrhosis, early  --Patient presented to the ER for alcohol detox.  Patient stated he usually drinks \"6 quarts\" of Melquiades Beam or \"whatever I can get \". Started drinking heavily approximately 2 years ago.  Last drink on 7/26-allegedly  --Admit serum alcohol negative  --no steroids d/t maddreys score  --Labs: AST//73, T. bili 0.8, alk phos 190, INR 1  --CT w/out evidence of cirrhosis  --Issues over the weekend -tolerating p.o. diet without any issue-despite repetitive complaints of abdominal pain requesting specifically IV morphine or Dilaudid-this happened on 7/28 after he consumed 3 turkey boxes without any nausea or vomiting-has had no issues since  --liver US tiny amount of ascites  --prn bowel regimen, prn lactulose-has been having 2-3 bms daily  --Continue p.o. Librium taper, continue thiamine, multivitamins and folate    #Microcytic anemia, suspect chronic  #Thrombocytopenia  --Hemoglobin 10.1, MCV 78, suspect chronic but no prior labs available.  Thrombocytopenia likely multifactorial due to alcohol abuse versus sepsis mediated due to ESBL bacteremia  --Hemoglobin 8.2 this a.m., only form of ABL was hematuria  --Labs stable and improving, repeat in a.m.    #Hypertensive urgency, POA  --Likely has hypertension at baseline and is untreated due to medication none compliance and nonadherence  --Continue Coreg to 6.25 twice daily + increased new losartan (proteinuria), as needed labetalol, will adjust p.o. antihypertensive regimen as needed-if he remains hypertensive, would add nifedipine next    #NSTEMI, type 2  #Atypical chest pain  --due to the above. Patient c/0 " of atypical chest pain on admission, asymptomatic since  --Initial troponin 24, repeat 22 with a -2 delta   --EKG noted NSR without acute ST or T wave changes  --Echocardiogram noted an EF of 51 to 55%  --Continue beta-blocker, aspirin/statin, ARB  --Outpatient cardiology follow-up    #Pulmonary nodule, needs outpatient follow-up in 3 months  #Proteinuria, protein creatinine ratio 1.5 g, continue new losartan  #Ascending aortic ectasia 3.9 cm noted on CT angiogram, outpatient follow-up  #HCV antibody positive, RNA quant sent  #Chronic pancreatitis due to EtOH use, supportive care  #Hypovolemic hyponatremia, not clinically significant  #Hypomagnesemia, replace per protocol    CHECKLIST:  Abx: Invanz  VTE: Lovenox  GI ppx: PPI  Diet: Consistent carb  Code: CPR, full  Dispo: Patient is medically stable, requires 2 weeks of IV antibiotics for ESBL E. coli bacteremia.  He is not in acute alcohol withdrawal. Issue will be with dispo, will see if he is a [facility] candidate as he is homeless and unable to dispo home with IV antibiotics.     Christian Chavez DO  [facility] Hospitalist  07/30/23  10:45 EDT

## 2023-07-30 NOTE — PLAN OF CARE
Problem: Adult Inpatient Plan of Care  Goal: Absence of Hospital-Acquired Illness or Injury  Intervention: Prevent Skin Injury  Recent Flowsheet Documentation  Taken 7/29/2023 1936 by Mono Sung RN  Body Position:   position changed independently   supine  Skin Protection:   adhesive use limited   incontinence pads utilized     Problem: Adult Inpatient Plan of Care  Goal: Absence of Hospital-Acquired Illness or Injury  Intervention: Prevent Infection  Recent Flowsheet Documentation  Taken 7/29/2023 2311 by Mono Sung RN  Infection Prevention:   rest/sleep promoted   environmental surveillance performed  Taken 7/29/2023 2131 by Mono Sung RN  Infection Prevention:   rest/sleep promoted   environmental surveillance performed  Taken 7/29/2023 1936 by Mono Sung RN  Infection Prevention:   rest/sleep promoted   environmental surveillance performed   Goal Outcome Evaluation:

## 2023-07-31 PROCEDURE — 97116 GAIT TRAINING THERAPY: CPT

## 2023-07-31 PROCEDURE — 05HC33Z INSERTION OF INFUSION DEVICE INTO LEFT BASILIC VEIN, PERCUTANEOUS APPROACH: ICD-10-PCS | Performed by: STUDENT IN AN ORGANIZED HEALTH CARE EDUCATION/TRAINING PROGRAM

## 2023-07-31 PROCEDURE — 25010000002 ERTAPENEM PER 500 MG: Performed by: NURSE PRACTITIONER

## 2023-07-31 PROCEDURE — 36410 VNPNXR 3YR/> PHY/QHP DX/THER: CPT

## 2023-07-31 PROCEDURE — 25010000002 THIAMINE PER 100 MG: Performed by: STUDENT IN AN ORGANIZED HEALTH CARE EDUCATION/TRAINING PROGRAM

## 2023-07-31 PROCEDURE — 25010000002 ENOXAPARIN PER 10 MG: Performed by: STUDENT IN AN ORGANIZED HEALTH CARE EDUCATION/TRAINING PROGRAM

## 2023-07-31 PROCEDURE — 99233 SBSQ HOSP IP/OBS HIGH 50: CPT | Performed by: STUDENT IN AN ORGANIZED HEALTH CARE EDUCATION/TRAINING PROGRAM

## 2023-07-31 PROCEDURE — C1751 CATH, INF, PER/CENT/MIDLINE: HCPCS

## 2023-07-31 PROCEDURE — 99232 SBSQ HOSP IP/OBS MODERATE 35: CPT | Performed by: NURSE PRACTITIONER

## 2023-07-31 RX ORDER — SODIUM CHLORIDE 9 MG/ML
40 INJECTION, SOLUTION INTRAVENOUS AS NEEDED
Status: DISCONTINUED | OUTPATIENT
Start: 2023-07-31 | End: 2023-08-10 | Stop reason: HOSPADM

## 2023-07-31 RX ORDER — SODIUM CHLORIDE 0.9 % (FLUSH) 0.9 %
10 SYRINGE (ML) INJECTION AS NEEDED
Status: DISCONTINUED | OUTPATIENT
Start: 2023-07-31 | End: 2023-08-10 | Stop reason: HOSPADM

## 2023-07-31 RX ORDER — SODIUM CHLORIDE 0.9 % (FLUSH) 0.9 %
10 SYRINGE (ML) INJECTION EVERY 12 HOURS SCHEDULED
Status: DISCONTINUED | OUTPATIENT
Start: 2023-07-31 | End: 2023-08-10 | Stop reason: HOSPADM

## 2023-07-31 RX ADMIN — Medication 10 ML: at 21:03

## 2023-07-31 RX ADMIN — ATORVASTATIN CALCIUM 40 MG: 40 TABLET, FILM COATED ORAL at 21:02

## 2023-07-31 RX ADMIN — Medication 10 ML: at 08:44

## 2023-07-31 RX ADMIN — CARVEDILOL 6.25 MG: 6.25 TABLET, FILM COATED ORAL at 08:43

## 2023-07-31 RX ADMIN — FOLIC ACID 1 MG: 5 INJECTION, SOLUTION INTRAMUSCULAR; INTRAVENOUS; SUBCUTANEOUS at 10:34

## 2023-07-31 RX ADMIN — ACETAMINOPHEN 650 MG: 325 TABLET ORAL at 17:32

## 2023-07-31 RX ADMIN — THIAMINE HYDROCHLORIDE 200 MG: 100 INJECTION, SOLUTION INTRAMUSCULAR; INTRAVENOUS at 22:00

## 2023-07-31 RX ADMIN — THIAMINE HYDROCHLORIDE 200 MG: 100 INJECTION, SOLUTION INTRAMUSCULAR; INTRAVENOUS at 05:15

## 2023-07-31 RX ADMIN — CHLORDIAZEPOXIDE HYDROCHLORIDE 10 MG: 5 CAPSULE ORAL at 21:02

## 2023-07-31 RX ADMIN — THIAMINE HYDROCHLORIDE 200 MG: 100 INJECTION, SOLUTION INTRAMUSCULAR; INTRAVENOUS at 17:28

## 2023-07-31 RX ADMIN — IBUPROFEN 800 MG: 400 TABLET, FILM COATED ORAL at 21:02

## 2023-07-31 RX ADMIN — ERTAPENEM SODIUM 1000 MG: 1 INJECTION INTRAMUSCULAR; INTRAVENOUS at 08:44

## 2023-07-31 RX ADMIN — TRAMADOL HYDROCHLORIDE 50 MG: 50 TABLET, COATED ORAL at 05:15

## 2023-07-31 RX ADMIN — LOSARTAN POTASSIUM 100 MG: 50 TABLET, FILM COATED ORAL at 08:43

## 2023-07-31 RX ADMIN — CHLORDIAZEPOXIDE HYDROCHLORIDE 10 MG: 5 CAPSULE ORAL at 08:43

## 2023-07-31 RX ADMIN — ENOXAPARIN SODIUM 40 MG: 40 INJECTION SUBCUTANEOUS at 21:02

## 2023-07-31 RX ADMIN — TRAMADOL HYDROCHLORIDE 50 MG: 50 TABLET, COATED ORAL at 17:32

## 2023-07-31 RX ADMIN — CARVEDILOL 6.25 MG: 6.25 TABLET, FILM COATED ORAL at 17:27

## 2023-07-31 RX ADMIN — ASPIRIN 81 MG: 81 TABLET, CHEWABLE ORAL at 08:43

## 2023-07-31 RX ADMIN — NIFEDIPINE 30 MG: 30 TABLET, EXTENDED RELEASE ORAL at 08:43

## 2023-07-31 NOTE — PROGRESS NOTES
PROGRESS NOTE         Patient Identification:  Name:  Edvin Jiménez  Age:  52 y.o.  Sex:  male  :  1970  MRN:  [id number]  Visit Number:  [id number]  Primary Care Provider:  Provider, No Known         LOS: 4 days       ----------------------------------------------------------------------------------------------------------------------  Subjective       Chief Complaints:    Alcohol Problem        Interval History:      Patient resting comfortably in bed this morning.  Currently on room air with no apparent distress.  Reports overall improved left upper quadrant and flank pain.  Currently on room air with no apparent distress.  Lungs clear to auscultation bilaterally.  Afebrile, denies diarrhea.  WBC normal at 3.89.  Blood cultures from 2023 show no growth thus far.    Review of Systems:    Constitutional: no fever, chills and night sweats.  Generalized fatigue.  Eyes: no eye drainage, itching or redness.  HEENT: no mouth sores, dysphagia or nose bleed.  Respiratory: no for shortness of breath, cough or production of sputum.  Cardiovascular: no chest pain, no palpitations, no orthopnea.  Gastrointestinal: no nausea, vomiting or diarrhea. No abdominal pain, hematemesis or rectal bleeding.  Genitourinary: no dysuria or polyuria.  Hematologic/lymphatic: no lymph node abnormalities, no easy bruising or easy bleeding.  Musculoskeletal: no muscle or joint pain.  Skin: No rash and no itching.  Neurological: no loss of consciousness, no seizure, no headache.  Behavioral/Psych: no depression or suicidal ideation.  Endocrine: no hot flashes.  Immunologic: negative.    ----------------------------------------------------------------------------------------------------------------------      Objective       [facility] Meds:  aspirin, 81 mg, Oral, Daily  atorvastatin, 40 mg, Oral, Nightly  carvedilol, 6.25 mg, Oral, BID With Meals  chlordiazePOXIDE, 10 mg, Oral, BID  [START ON 2023]  chlordiazePOXIDE, 10 mg, Oral, Once  enoxaparin, 40 mg, Subcutaneous, Nightly  ertapenem, 1,000 mg, Intravenous, Q24H  folic acid (FOLVITE) IVPB, 1 mg, Intravenous, Daily  losartan, 100 mg, Oral, Q24H  NIFEdipine CC, 30 mg, Oral, Q24H  sodium chloride, 10 mL, Intravenous, Q12H  sodium chloride, 10 mL, Intravenous, Q12H  thiamine (B-1) IV, 200 mg, Intravenous, Q8H   Followed by  [START ON 8/1/2023] thiamine, 100 mg, Oral, Daily         ----------------------------------------------------------------------------------------------------------------------    Vital Signs:  Temp:  [97.9 øF (36.6 øC)-98.7 øF (37.1 øC)] 98.7 øF (37.1 øC)  Heart Rate:  [73-95] 95  Resp:  [18] 18  BP: (106-210)/() 152/113  Mean Arterial Pressure (Non-Invasive) for the past 24 hrs (Last 3 readings):   Noninvasive MAP (mmHg)   07/31/23 1136 127   07/31/23 0640 107   07/31/23 0426 104       SpO2 Percentage    07/31/23 0426 07/31/23 0640 07/31/23 1136   SpO2: 94% 99% 96%     SpO2:  [93 %-99 %] 96 %  on   ;   Device (Oxygen Therapy): room air    Body mass index is 20.7 kg/mý.  Wt Readings from Last 3 Encounters:   07/31/23 63.6 kg (140 lb 3.2 oz)        Intake/Output Summary (Last 24 hours) at 7/31/2023 1158  Last data filed at 7/31/2023 1000  Gross per 24 hour   Intake 720 ml   Output --   Net 720 ml       Diet: Regular/House Diet; Texture: Regular Texture (IDDSI 7); Fluid Consistency: Thin (IDDSI 0)  ----------------------------------------------------------------------------------------------------------------------      Physical Exam:    Constitutional:  Well-developed and well-nourished.  -American male.  Currently on room air with no apparent distress.  Awake and alert this morning.  HENT:  Head: Normocephalic and atraumatic.  Mouth:  Moist mucous membranes.    Eyes:  Conjunctivae and EOM are normal.  No scleral icterus.  Neck:  Neck supple.  No JVD present.    Cardiovascular:  Normal rate, regular rhythm and normal heart sounds  with no murmur. No edema.  Pulmonary/Chest:  No respiratory distress, no wheezes, no crackles, with normal breath sounds and good air movement.  Abdominal:  Soft.  Bowel sounds are normal.  No distension and no tenderness.  Improving left-sided flank pain  Musculoskeletal:  No edema, no tenderness, and no deformity.  No swelling or redness of joints.  Neurological:  Alert and oriented to person, place, and time.  No facial droop.  No slurred speech.   Skin:  Skin is warm and dry.  No rash noted.  No pallor.   Psychiatric:  Normal mood and affect.  Behavior is normal.        ----------------------------------------------------------------------------------------------------------------------  Results from last 7 days   Lab Units 07/27/23  1236 07/27/23  1108   HSTROP T ng/L 22* 24*         Results from last 7 days   Lab Units 07/27/23  1236   CHOLESTEROL mg/dL 138   TRIGLYCERIDES mg/dL 53   HDL CHOL mg/dL 110*   LDL CHOL mg/dL 16         Results from last 7 days   Lab Units 07/30/23  0405 07/29/23  0018 07/28/23  0004 07/27/23  1852 07/27/23  1530 07/27/23  1236   LACTATE mmol/L  --   --   --  1.6 3.0* 2.3*   WBC 10*3/mm3 3.89 5.93 7.12  --   --   --    HEMOGLOBIN g/dL 8.2* 8.0* 9.5*  --   --   --    HEMATOCRIT % 26.4* 25.3* 30.6*  --   --   --    MCV fL 81.7 80.6 81.2  --   --   --    MCHC g/dL 31.1* 31.6 31.0*  --   --   --    PLATELETS 10*3/mm3 100* 95* 97*  --   --   --    INR   --   --   --   --   --  1.02       Results from last 7 days   Lab Units 07/30/23  0405 07/29/23  0018 07/28/23  0004 07/27/23  1852 07/27/23  1108   SODIUM mmol/L 138 135* 133*  --  133*   POTASSIUM mmol/L 4.2 4.2 3.7  --  4.0   MAGNESIUM mg/dL  --   --  2.5 1.4* 1.2*   CHLORIDE mmol/L 103 101 96*  --  92*   CO2 mmol/L 27.3 25.0 23.9  --  29.0   BUN mg/dL 10 11 10  --  8   CREATININE mg/dL 0.87 1.02 1.01  --  0.87   CALCIUM mg/dL 8.4* 8.1* 7.8*  --  8.7   GLUCOSE mg/dL 111* 133* 173*  --  128*   ALBUMIN g/dL  --   --   --   --  3.6    BILIRUBIN mg/dL  --   --   --   --  0.8   ALK PHOS U/L  --   --   --   --  190*   AST (SGOT) U/L  --   --   --   --  190*   ALT (SGPT) U/L  --   --   --   --  73*     Estimated Creatinine Clearance: 89.3 mL/min (by C-G formula based on SCr of 0.87 mg/dL).  No results found for: AMMONIA    No results found for: HGBA1C, POCGLU    Lab Results   Component Value Date    HGBA1C 5.60 07/27/2023     Lab Results   Component Value Date    TSH 1.120 07/27/2023       Blood Culture   Date Value Ref Range Status   07/27/2023 Gram Negative Bacilli (C)  Preliminary   07/27/2023 Gram Negative Bacilli (C)  Preliminary     Urine Culture   Date Value Ref Range Status   07/27/2023 >100,000 CFU/mL Escherichia coli ESBL (A)  Final     Comment:       Consider infectious disease consult.  Susceptibility results may not correlate to clinical outcomes.     No results found for: WOUNDCX  No results found for: STOOLCX  No results found for: RESPCX  Pain Management Panel          Latest Ref Rng & Units 7/27/2023   Pain Management Panel   Creatinine, Urine mg/dL 47.4    Amphetamine, Urine Qual Negative Negative    Barbiturates Screen, Urine Negative Negative    Benzodiazepine Screen, Urine Negative Negative    Buprenorphine, Screen, Urine Negative Negative    Cocaine Screen, Urine Negative Negative    Fentanyl, Urine Negative Negative    Methadone Screen , Urine Negative Negative    Methamphetamine, Ur Negative Negative      ----------------------------------------------------------------------------------------------------------------------  Imaging Results (Last 24 Hours)       ** No results found for the last 24 hours. **            ----------------------------------------------------------------------------------------------------------------------    Pertinent Infectious Disease Results      Presented to [facility] Emergency Department on 7/27/2023 for chest pain and vomiting. Chlamydia and gonorrhea negative.  COVID-19 PCR  negative.  Procalcitonin slightly elevated at 0.39.  Lactic acid elevated at 2.3 on admission.  Blood cultures from 7/27/2023 2 out of 2 sets positive for ESBL E. coli.  HIV negative.  Hepatitis C reactive.           Assessment/Plan       Assessment     Severe sepsis with lactic acid greater than 2 on admission  ESBL bacteremia  Possible cystitis        Plan      Patient resting comfortably in bed this morning.  Currently on room air with no apparent distress.  Reports overall improved left upper quadrant and flank pain.  Currently on room air with no apparent distress.  Lungs clear to auscultation bilaterally.  Afebrile, denies diarrhea.  WBC normal at 3.89.  Blood cultures from 7/28/2023 show no growth thus far.    Urine culture from 7/27/2023 finalizes greater than 100,000 colonies of E. coli ESBL.     Based on finalization of culture results patient require 2-week course of ertapenem 1 g IV every 24 hours for treatment of ESBL bacteremia with urinary source through 8/11/2023.  We will continue to follow closely and adjust antibiotic therapy as needed.      ANTIMICROBIAL THERAPY    ertapenem (INVanz) 1000 mg in 100ml NS VTB     Code Status:   Code Status and Medical Interventions:   Ordered at: 07/27/23 1358     Code Status (Patient has no pulse and is not breathing):    CPR (Attempt to Resuscitate)     Medical Interventions (Patient has pulse or is breathing):    Full Support     Release to patient:    Routine Release       YISSEL Gómez  07/31/23  11:58 EDT

## 2023-07-31 NOTE — PLAN OF CARE
Problem: Adult Inpatient Plan of Care  Goal: Absence of Hospital-Acquired Illness or Injury  Intervention: Prevent Skin Injury  Recent Flowsheet Documentation  Taken 7/30/2023 1940 by Mono Sung RN  Body Position:   position changed independently   supine  Skin Protection:   adhesive use limited   incontinence pads utilized     Problem: Skin Injury Risk Increased  Goal: Skin Health and Integrity  Intervention: Optimize Skin Protection  Recent Flowsheet Documentation  Taken 7/30/2023 1940 by Mono Sung RN  Pressure Reduction Techniques:   frequent weight shift encouraged   rest period provided between sit times   weight shift assistance provided  Head of Bed (HOB) Positioning: HOB elevated  Pressure Reduction Devices:   alternating pressure pump (ADD)   pressure-redistributing mattress utilized  Skin Protection:   adhesive use limited   incontinence pads utilized   Goal Outcome Evaluation:

## 2023-07-31 NOTE — CONSULTS
Assessment:  Diagnosis: Alcohol withdrawal    No Known Allergies    Order Date/Time: 7/30/2023 0032  Indications: iv abx to 8/11  LABS:  Lab Results   Component Value Date    INR 1.02 07/27/2023    PROTIME 13.9 07/27/2023     Lab Results   Component Value Date    PTT 30.9 07/27/2023     Lab Results   Component Value Date    WBC 3.89 07/30/2023    HGB 8.2 (L) 07/30/2023    HCT 26.4 (L) 07/30/2023    MCV 81.7 07/30/2023     (L) 07/30/2023     Lab Results   Component Value Date    BUN 10 07/30/2023     Lab Results   Component Value Date    CREATININE 0.87 07/30/2023     No results found for: EGFRIFNONA, EGFRIFAFRI  Labs Reviewed: all labs reviewed    Contraindications for PICC/Midline:  No contraindications noted    Recommendations:  PowerGlide Pro Midline Catheter; 18 g; 10 cm  Upper Left Basilic    Procedure Time Out:  Time out Time: 1500  Correct Patient Identity: Yes  Correct Surgical Side and Site Are Marked: Yes  Agreement on Procedure to be done: Yes  Antibiotic Given: N/A  RN: ARTIS Ritter RN    PowerGlide Pro Midline Catheter placed with ultrasound guidance and verified by blood return. Minimal blood loss noted. Catheter flushes easily. Blood return noted. Patient nurse, Shon MUNSON, made aware that midline is in upper L arm and ready for use.      Janet Ritter RN

## 2023-07-31 NOTE — PROGRESS NOTES
[facility] HOSPITALIST PROGRESS NOTE     Patient Identification:  Name:  Edvin Jiménez  Age:  52 y.o.  Sex:  male  :  1970  MRN:  [id number]  Visit Number:  [id number]  ROOM: [address] [place] [address]     Primary Care Provider:  Alexandrea Lee Known     Date of Admission: 2023    Length of stay in inpatient status:  4    Subjective     Chief Compliant:    Chief Complaint   Patient presents with    Alcohol Problem       No acute events overnight. Resting in bed this morning with IV intact without reported issues at site. Discussed dispo and patient voices desire to return to inpatient rehab in [facility], otherwise no clear dispo plan for now.         Objective     Current Hospital Meds:  aspirin, 81 mg, Oral, Daily  atorvastatin, 40 mg, Oral, Nightly  carvedilol, 6.25 mg, Oral, BID With Meals  chlordiazePOXIDE, 10 mg, Oral, BID  [START ON 2023] chlordiazePOXIDE, 10 mg, Oral, Once  enoxaparin, 40 mg, Subcutaneous, Nightly  ertapenem, 1,000 mg, Intravenous, Q24H  folic acid (FOLVITE) IVPB, 1 mg, Intravenous, Daily  losartan, 100 mg, Oral, Q24H  NIFEdipine CC, 30 mg, Oral, Q24H  sodium chloride, 10 mL, Intravenous, Q12H  sodium chloride, 10 mL, Intravenous, Q12H  thiamine (B-1) IV, 200 mg, Intravenous, Q8H   Followed by  [START ON 2023] thiamine, 100 mg, Oral, Daily       Current Antimicrobial Therapy:  Anti-Infectives (From admission, onward)      Ordered     Dose/Rate Route Frequency Start Stop    23 0444  ertapenem (INVanz) 1,000 mg in sodium chloride 0.9 % 100 mL IVPB-VTB        Ordering Provider: Liyah Manriquez APRN    1,000 mg  200 mL/hr over 30 Minutes Intravenous Every 24 Hours 23 0800 23 0759    23 1146  piperacillin-tazobactam (ZOSYN) IVPB 3.375 g in 100 mL NS VTB        Ordering Provider: Colby Lam DO    3.375 g  over 30 Minutes Intravenous Once 23 1202 23 1330          Current Diuretic Therapy:  Diuretics (From admission, onward)      None           ----------------------------------------------------------------------------------------------------------------------  Vital Signs:  Temp:  [97.9 øF (36.6 øC)-98.7 øF (37.1 øC)] 98.7 øF (37.1 øC)  Heart Rate:  [73-95] 95  Resp:  [18] 18  BP: (106-210)/() 152/113  SpO2:  [93 %-99 %] 96 %  on   ;   Device (Oxygen Therapy): room air  Body mass index is 20.7 kg/mý.    Wt Readings from Last 3 Encounters:   07/31/23 63.6 kg (140 lb 3.2 oz)     Intake & Output (last 3 days)         07/28 0701 07/29 0700 07/29 0701 07/30 0700 07/30 0701 07/31 0700 07/31 0701 08/01 0700    P.O. 576 942 600 720    I.V. (mL/kg) 2964.6 (47.3)       IV Piggyback 50 50      Total Intake(mL/kg) 3590.6 (57.3) 992 (15.8) 600 (9.4) 720 (11.3)    Urine (mL/kg/hr) 2850 (1.9)       Stool        Total Output 2850       Net +740.6 +992 +600 +720            Urine Unmeasured Occurrence 3 x 10 x 10 x     Stool Unmeasured Occurrence  3 x            Diet: Regular/House Diet; Texture: Regular Texture (IDDSI 7); Fluid Consistency: Thin (IDDSI 0)  ----------------------------------------------------------------------------------------------------------------------  Physical Exam  Vitals and nursing note reviewed.   Constitutional:       General: He is not in acute distress.  HENT:      Head: Normocephalic and atraumatic.      Mouth/Throat:      Mouth: Mucous membranes are moist.      Pharynx: Oropharynx is clear.   Eyes:      General: No scleral icterus.     Extraocular Movements: Extraocular movements intact.   Cardiovascular:      Rate and Rhythm: Normal rate.      Pulses: Normal pulses.      Heart sounds: No murmur heard.  Pulmonary:      Effort: Pulmonary effort is normal. No respiratory distress.   Abdominal:      General: Abdomen is flat.      Palpations: Abdomen is soft.   Musculoskeletal:      Right lower leg: No edema.      Left lower leg: No edema.   Skin:     General: Skin is warm and dry.      Capillary Refill: Capillary refill  takes less than 2 seconds.   Neurological:      General: No focal deficit present.      Mental Status: He is alert and oriented to person, place, and time.   Psychiatric:         Mood and Affect: Mood normal.         Behavior: Behavior normal.     ----------------------------------------------------------------------------------------------------------------------    ----------------------------------------------------------------------------------------------------------------------  LABS:    CBC and coagulation:  Results from last 7 days   Lab Units 07/30/23  0405 07/29/23  0018 07/28/23  0004 07/27/23  1852 07/27/23  1530 07/27/23  1236 07/27/23  1108   PROCALCITONIN ng/mL  --   --   --   --   --  0.39*  --    LACTATE mmol/L  --   --   --  1.6 3.0* 2.3*  --    WBC 10*3/mm3 3.89 5.93 7.12  --   --   --  6.68   HEMOGLOBIN g/dL 8.2* 8.0* 9.5*  --   --   --  10.1*   HEMATOCRIT % 26.4* 25.3* 30.6*  --   --   --  31.3*   MCV fL 81.7 80.6 81.2  --   --   --  78.3*   MCHC g/dL 31.1* 31.6 31.0*  --   --   --  32.3   PLATELETS 10*3/mm3 100* 95* 97*  --   --   --  128*   INR   --   --   --   --   --  1.02  --      Acid/base balance:      Renal and electrolytes:  Results from last 7 days   Lab Units 07/30/23  0405 07/29/23  0018 07/28/23  0004 07/27/23  1852 07/27/23  1108   SODIUM mmol/L 138 135* 133*  --  133*   POTASSIUM mmol/L 4.2 4.2 3.7  --  4.0   MAGNESIUM mg/dL  --   --  2.5 1.4* 1.2*   CHLORIDE mmol/L 103 101 96*  --  92*   CO2 mmol/L 27.3 25.0 23.9  --  29.0   BUN mg/dL 10 11 10  --  8   CREATININE mg/dL 0.87 1.02 1.01  --  0.87   CALCIUM mg/dL 8.4* 8.1* 7.8*  --  8.7   PHOSPHORUS mg/dL  --   --  4.0 4.4  --    GLUCOSE mg/dL 111* 133* 173*  --  128*     Estimated Creatinine Clearance: 89.3 mL/min (by C-G formula based on SCr of 0.87 mg/dL).    Liver and pancreatic function:  Results from last 7 days   Lab Units 07/27/23  1108   ALBUMIN g/dL 3.6   BILIRUBIN mg/dL 0.8   ALK PHOS U/L 190*   AST (SGOT) U/L 190*   ALT  (SGPT) U/L 73*   LIPASE U/L 19     Endocrine function:  Lab Results   Component Value Date    HGBA1C 5.60 07/27/2023     Point of care bedside glucose levels:      Glucose levels from the CMP:  Results from last 7 days   Lab Units 07/30/23  0405 07/29/23  0018 07/28/23  0004 07/27/23  1108   GLUCOSE mg/dL 111* 133* 173* 128*     Lab Results   Component Value Date    TSH 1.120 07/27/2023     Cardiac:  Results from last 7 days   Lab Units 07/27/23  1236 07/27/23  1108   HSTROP T ng/L 22* 24*     Results from last 7 days   Lab Units 07/27/23  1236   CHOLESTEROL mg/dL 138   TRIGLYCERIDES mg/dL 53   HDL CHOL mg/dL 110*   LDL CHOL mg/dL 16     Cultures:  Lab Results   Component Value Date    COLORU Red (A) 07/27/2023    CLARITYU Turbid (A) 07/27/2023    PHUR 7.0 07/27/2023    PROTEINUR 77.5 07/27/2023    GLUCOSEU Negative 07/27/2023    KETONESU Negative 07/27/2023    BLOODU Large (3+) (A) 07/27/2023    NITRITEU Negative 07/27/2023    LEUKOCYTESUR Large (3+) (A) 07/27/2023    BILIRUBINUR Small (1+) (A) 07/27/2023    UROBILINOGEN 1.0 E.U./dL 07/27/2023    RBCUA Too Numerous to Count (A) 07/27/2023    WBCUA Too Numerous to Count (A) 07/27/2023    BACTERIA None Seen 07/27/2023     Microbiology Results (last 10 days)       Procedure Component Value - Date/Time    Blood Culture - Blood, Wrist, Right [[id number]]  (Normal) Collected: 07/28/23 1200    Lab Status: Preliminary result Specimen: Blood from Wrist, Right Updated: 07/31/23 1300     Blood Culture No growth at 3 days    Blood Culture - Blood, Arm, Left [[id number]]  (Normal) Collected: 07/28/23 1148    Lab Status: Preliminary result Specimen: Blood from Arm, Left Updated: 07/31/23 1300     Blood Culture No growth at 3 days    Chlamydia trachomatis, Neisseria gonorrhoeae, PCR - Urine, Urine, Random Void [[id number]]  (Normal) Collected: 07/27/23 1855    Lab Status: Final result Specimen: Urine, Random Void Updated: 07/27/23 2031     Chlamydia DNA by PCR Not Detected      Neisseria gonorrhoeae by PCR Not Detected    Narrative:      PCR testing performed using target DNA sequences.    Blood Culture - Blood, Hand, Left [[id number]]  (Abnormal)  (Susceptibility) Collected: 07/27/23 1239    Lab Status: Final result Specimen: Blood from Hand, Left Updated: 07/30/23 1006     Blood Culture Escherichia coli ESBL     Comment:   Consider infectious disease consult.  Susceptibility results may not correlate to clinical outcomes.  For ESBL-producing infections in the blood, a carbapenem is recommended as first-line therapy for optimal clinical outcomes.        Isolated from Aerobic and Anaerobic Bottles     Gram Stain Anaerobic Bottle Gram negative bacilli      Aerobic Bottle Gram negative bacilli    Susceptibility        Escherichia coli ESBL      SANGEETHA      Ertapenem Susceptible      Meropenem Susceptible                       Susceptibility Comments       Escherichia coli ESBL    Cefazolin sensitivity will not be reported for Enterobacteriaceae in non-urine isolates. If cefazolin is preferred, please call the microbiology lab to request an E-test.  With the exception of urinary-sourced infections, aminoglycosides should not be used as monotherapy.               Blood Culture ID, PCR - Blood, Hand, Left [[id number]]  (Abnormal) Collected: 07/27/23 1239    Lab Status: Final result Specimen: Blood from Hand, Left Updated: 07/28/23 0423     BCID, PCR Escherichia coli. Identification by BCID2 PCR.     BCID, PCR 2 CTX-M (ESBL) detected. Identification by BCID2 PCR     BOTTLE TYPE Aerobic Bottle    Blood Culture - Blood, Arm, Left [[id number]]  (Abnormal) Collected: 07/27/23 1236    Lab Status: Final result Specimen: Blood from Arm, Left Updated: 07/30/23 1006     Blood Culture Escherichia coli ESBL     Comment:   Consider infectious disease consult.  Susceptibility results may not correlate to clinical outcomes.  For ESBL-producing infections in the blood, a carbapenem is recommended as first-line  therapy for optimal clinical outcomes.        Isolated from Aerobic and Anaerobic Bottles     Gram Stain Aerobic Bottle Gram negative bacilli      Anaerobic Bottle Gram negative bacilli    Narrative:      Refer to previous blood culture collected on 07/27/2023 1239 for MICs.    COVID PRE-OP / PRE-PROCEDURE SCREENING ORDER (NO ISOLATION) - Swab, Nasopharynx [[id number]]  (Normal) Collected: 07/27/23 1129    Lab Status: Final result Specimen: Swab from Nasopharynx Updated: 07/27/23 1250    Narrative:      The following orders were created for panel order COVID PRE-OP / PRE-PROCEDURE SCREENING ORDER (NO ISOLATION) - Swab, Nasopharynx.  Procedure                               Abnormality         Status                     ---------                               -----------         ------                     COVID-19,CEPHEID/ROSE,CO...[[id number]]  Normal              Final result                 Please view results for these tests on the individual orders.    COVID-19,CEPHEID/ROSE,COR/AVELINA/PAD/BECKY/MAD IN-HOUSE(OR EMERGENT/ADD-ON),NP SWAB IN TRANSPORT MEDIA 3-4 HR TAT, RT-PCR - Swab, Nasopharynx [[id number]]  (Normal) Collected: 07/27/23 1129    Lab Status: Final result Specimen: Swab from Nasopharynx Updated: 07/27/23 1250     COVID19 Not Detected    Narrative:      Fact sheet for providers: https://www.fda.gov/media/018246/download     Fact sheet for patients: https://www.fda.gov/media/789157/download  Fact sheet for providers: https://www.fda.gov/media/255283/download    Fact sheet for patients: https://www.fda.gov/media/646326/download    Test performed by PCR.    Urine Culture - Urine, Urine, Clean Catch [[id number]]  (Abnormal)  (Susceptibility) Collected: 07/27/23 1124    Lab Status: Final result Specimen: Urine, Clean Catch Updated: 07/29/23 0457     Urine Culture >100,000 CFU/mL Escherichia coli ESBL     Comment:   Consider infectious disease consult.  Susceptibility results may not correlate to clinical outcomes.        Narrative:      Colonization of the urinary tract without infection is common. Treatment is discouraged unless the patient is symptomatic, pregnant, or undergoing an invasive urologic procedure.  Recent outcomes data supports the use of pip/tazo in the treatment of susceptible ESBL infections for uncomplicated UTI. Consider use of pip/tazo as a carbapenem-sparing regimen in applicable patients.    Susceptibility        Escherichia coli ESBL      SANGEETHA      Amikacin Susceptible      Ertapenem Susceptible      Gentamicin Resistant      Levofloxacin Resistant      Meropenem Susceptible      Nitrofurantoin Intermediate      Piperacillin + Tazobactam Susceptible      Tobramycin Resistant      Trimethoprim + Sulfamethoxazole Resistant                                   No results found for: PREGTESTUR, PREGSERUM, HCG, HCGQUANT  Pain Management Panel          Latest Ref Rng & Units 7/27/2023   Pain Management Panel   Creatinine, Urine mg/dL 47.4    Amphetamine, Urine Qual Negative Negative    Barbiturates Screen, Urine Negative Negative    Benzodiazepine Screen, Urine Negative Negative    Buprenorphine, Screen, Urine Negative Negative    Cocaine Screen, Urine Negative Negative    Fentanyl, Urine Negative Negative    Methadone Screen , Urine Negative Negative    Methamphetamine, Ur Negative Negative        I have personally looked at the labs and they are summarized above.  ----------------------------------------------------------------------------------------------------------------------  Detailed radiology reports for the last 24 hours:    Imaging Results (Last 24 Hours)       ** No results found for the last 24 hours. **            Assessment & Plan      #Sepsis due to ESBL E. coli bacteremia  #Urinary tract infection ESBL E. coli, POA  -CT a/p w/cystitis noted. UA with hematuria, pyuria. Urine cx ESBL e/coli sensitive to ertapenem  -7/27 BCX ESBL, 7/28 bcx NGTD  -Midline ordered, ID consulted  -Cont ertapenem until  8/11, unfortunately no PO options available  -Inpatient [facility] consult placed to cont IV abx    #Acute alcohol withdrawal, exaggerated for secondary gain  #Drug-seeking behavior  #Elevated transaminases  #Chronic pancreatitis  #Alcoholic cirrhosis, early  -Librium taper completed, cont PO vitamin supplementation after completion of IV load  -Plans to try to discharge back to rehab in [place], otherwise no stable housing currently    #Microcytic anemia, suspect chronic  #Thrombocytopenia  -Stable    #Hypertensive urgency, POA   -Improved with carvedilol, increased ARB dose for proteinuria and htn    #Myocardial injury secondary to sepsis  -CP Resolved, Echo w/EF 51-55%    #Pulmonary nodule, needs outpatient follow-up in 3 months  #Proteinuria, protein creatinine ratio 1.5 g, continue new losartan  #Ascending aortic ectasia 3.9 cm noted on CT angiogram, outpatient follow-up  #HCV antibody positive, RNA quant sent and pending  #Chronic pancreatitis due to EtOH use, supportive care  #Hypovolemic hyponatremia, not clinically significant  #Hypomagnesemia, replace per protocol    VTE Prophylaxis:   Mechanical Order History:       None          Pharmalogical Order History:        Ordered     Dose Route Frequency Stop    07/27/23 1811  Enoxaparin Sodium (LOVENOX) syringe 40 mg         40 mg SC Nightly --                    Code Status and Medical Interventions:   Ordered at: 07/27/23 1358     Code Status (Patient has no pulse and is not breathing):    CPR (Attempt to Resuscitate)     Medical Interventions (Patient has pulse or is breathing):    Full Support     Release to patient:    Routine Release         Disposition: Cont inpatient IV abx, [facility] referral pend    I have reviewed any copied/forwarded text or data, verified its accuracy, and updated as necessary above.    Tavo Davis MD  [facility]  07/31/23  15:17 EDT

## 2023-07-31 NOTE — THERAPY DISCHARGE NOTE
Acute Care - Physical Therapy Treatment Note/Discharge   Jayesh     Patient Name: Edvin Jiménez  : 1970  MRN: [id number]  Today's Date: 2023   Onset of Illness/Injury or Date of Surgery: 23     Referring Physician: Scott      Admit Date: 2023    Visit Dx:    ICD-10-CM ICD-9-CM   1. Sepsis, due to unspecified organism, unspecified whether acute organ dysfunction present  A41.9 038.9     995.91   2. Cystitis  N30.90 595.9   3. Alcohol withdrawal syndrome with complication  F10.939 291.81     Patient Active Problem List   Diagnosis    Alcohol withdrawal     History reviewed. No pertinent past medical history.  History reviewed. No pertinent surgical history.    PT Assessment (last 12 hours)       PT Evaluation and Treatment       Row Name 23 1441          Physical Therapy Time and Intention    Subjective Information no complaints  -CS     Document Type discharge treatment  -CS     Mode of Treatment physical therapy  -CS     Patient Effort adequate  -CS     Comment Pt seen bedside this PM. Pt agreed to PT.  -CS       Row Name 23 1441          General Information    Patient Profile Reviewed yes  -CS       Row Name 23 1441          Cognition    Orientation Status (Cognition) oriented x 4  -CS       Row Name 23 1441          Bed Mobility    Bed Mobility bed mobility (all) activities  -CS     All Activities, [place] (Bed Mobility) independent  -CS       Row Name 23 1441          Transfers    Comment, (Transfers) Independent/SBA  -CS       Row Name 23 1441          Gait/Stairs (Locomotion)    Gait/Stairs Locomotion gait/ambulation independence  -CS     [place] Level (Gait) independent  -CS     Distance in Feet (Gait) ad maria esther  -CS     Comment, (Gait/Stairs) Normal reciprocal gait  -CS       Row Name             Wound 23 1814 Left anterior greater trochanter Blisters    Wound - Properties Group Placement Date: 23  -AW Placement Time:  1814 -AW Present on Hospital Admission: Y  -AW Side: Left  -AW Orientation: anterior  -AW Location: greater trochanter  -AW Primary Wound Type: Blisters  -AW    Retired Wound - Properties Group Placement Date: 07/27/23  -AW Placement Time: 1814 -AW Present on Hospital Admission: Y  -AW Side: Left  -AW Orientation: anterior  -AW Location: greater trochanter  -AW Primary Wound Type: Blisters  -AW    Retired Wound - Properties Group Date first assessed: 07/27/23  -AW Time first assessed: 1814 -AW Present on Hospital Admission: Y  -AW Side: Left  -AW Location: greater trochanter  -AW Primary Wound Type: Blisters  -AW      Row Name 07/31/23 1441          Plan of Care Review    Plan of Care Reviewed With patient  -CS     Progress improving  -CS     Outcome Evaluation Pt at or near his baseline for mobility. No further skilled inpatient PT needs at this time. Anticpate d/c home.  -CS       Row Name 07/31/23 1441          Positioning and Restraints    Pre-Treatment Position in bed  -CS     Post Treatment Position bed  -CS     In Bed supine;call light within reach;encouraged to call for assist  -CS       Row Name 07/31/23 1441          Therapy Assessment/Plan (PT)    Rehab Potential (PT) good, to achieve stated therapy goals  -CS     Criteria for Skilled Interventions Met (PT) yes;meets criteria;skilled treatment is necessary  -CS     Therapy Frequency (PT) 2 times/wk  2-5x/week  -CS     Problem List (PT) problems related to;balance;mobility;strength;pain  -CS     Activity Limitations Related to Problem List (PT) unable to ambulate safely;unable to transfer safely  -CS       Row Name 07/31/23 1441          Physical Therapy Goals    Transfer Goal Selection (PT) transfer, PT goal 1  -CS     Gait Training Goal Selection (PT) gait training, PT goal 1  -CS       Row Name 07/31/23 1441          Transfer Goal 1 (PT)    Activity/Assistive Device (Transfer Goal 1, PT) transfers, all  -CS     [place] Level/Cues Needed  (Transfer Goal 1, PT) standby assist  -CS     Time Frame (Transfer Goal 1, PT) long term goal (LTG);by discharge  -CS     Progress/Outcome (Transfer Goal 1, PT) goal met  -CS       Row Name 07/31/23 1441          Gait Training Goal 1 (PT)    Activity/Assistive Device (Gait Training Goal 1, PT) gait (walking locomotion)  -CS     [place] Level (Gait Training Goal 1, PT) standby assist  -CS     Distance (Gait Training Goal 1, PT) 150'  -CS     Time Frame (Gait Training Goal 1, PT) long term goal (LTG);by discharge  -CS     Progress/Outcome (Gait Training Goal 1, PT) goal met  -CS       Row Name 07/31/23 1441          Discharge Summary (PT)    Additional Documentation Discharge Summary (PT) (Group)  -CS       Row Name 07/31/23 1441          Discharge Summary (PT)    Outcomes Achieved/Progress Made Upon Discharge (PT) all goals met within established timeframes  -CS     Transfer to Another Level of Care or Facility (PT) home  -CS     Discharge Summary Statement (PT) Pt at or near his baseline for mobility. No further skilled inpatient PT needs at this time. D/C acute PT.  -CS               User Key  (r) = Recorded By, (t) = Taken By, (c) = Cosigned By      Initials Name Provider Type    CS Kulwant Cuevas, PT Physical Therapist    Inez Davalos, RN Registered Nurse                      Physical Therapy Education       Title: PT OT SLP Therapies (In Progress)       Topic: Physical Therapy (In Progress)       Point: Mobility training (In Progress)       Learning Progress Summary             Patient Acceptance, E, NR by HV at 7/28/2023 1621    Acceptance, E,TB, VU by CS at 7/28/2023 1302                         Point: Home exercise program (In Progress)       Learning Progress Summary             Patient Acceptance, E, NR by HV at 7/28/2023 1621    Acceptance, E,TB, VU by CS at 7/28/2023 1302                         Point: Body mechanics (In Progress)       Learning Progress Summary             Patient  Acceptance, E, NR by  at 7/28/2023 1621    Acceptance, E,TB, VU by  at 7/28/2023 1302                         Point: Precautions (In Progress)       Learning Progress Summary             Patient Acceptance, E, NR by  at 7/28/2023 1621    Acceptance, E,TB, VU by  at 7/28/2023 1302                                         User Key       Initials Effective Dates Name Provider Type Discipline     05/31/23 -  Kulwant Cuevas, PT Physical Therapist PT     06/12/22 -  Anuja Villalobos, JEIMY Registered Nurse Nurse                    PT Recommendation and Plan  Anticipated Discharge Disposition (PT): home  Planned Therapy Interventions (PT): balance training, bed mobility training, gait training, home exercise program, neuromuscular re-education, patient/family education, strengthening, transfer training  Therapy Frequency (PT): 2 times/wk (2-5x/week)  Plan of Care Reviewed With: patient  Progress: improving  Outcome Evaluation: Pt at or near his baseline for mobility. No further skilled inpatient PT needs at this time. Anticpate d/c home.         Time Calculation:    PT Charges       Row Name 07/31/23 1445             Time Calculation    Start Time 1400  -CS      PT Received On 07/31/23  -CS         Timed Charges    28301 - Gait Training Minutes  23  -CS         Total Minutes    Timed Charges Total Minutes 23  -CS       Total Minutes 23  -CS                User Key  (r) = Recorded By, (t) = Taken By, (c) = Cosigned By      Initials Name Provider Type     Kulwant Cuevas, PT Physical Therapist                  Therapy Charges for Today       Code Description Service Date Service Provider Modifiers Qty    75525964656 HC GAIT TRAINING EA 15 MIN 7/31/2023 Kulwant Cuevas, PT GP 2            PT G-Codes  AM-PAC 6 Clicks Score (PT): 22    PT Discharge Summary  Anticipated Discharge Disposition (PT): home    Kulwant Cuevas PT  7/31/2023
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
## 2023-07-31 NOTE — CASE MANAGEMENT/SOCIAL WORK
Discharge Planning Assessment   Jayesh     Patient Name: Edvin Jiménez  MRN: [id number]  Today's Date: 7/31/2023    Admit Date: 7/27/2023       Discharge Plan       Row Name 07/31/23 1802       Plan    Plan SS spoke with Pt at bedside on this date. Pt states he came from [facility] in [facility] and plans on returning there at discharge. Pt does not utilize home health or [facility] services. SS noted Pt will need 2 weeks of IV antibiotics. SS contacted [employer] ext [phone] per [place] who states Pt does not qualify for [place] Medicaid. SS attempted to give Pt number to apply for [facility] [phone] but Pt states he applied for medicaid at the detox center and did not want to call or re-apply. Pt states he would only want to complete the IV anbx here or at [facility]. SS to attempt to call [facility] on 08/01/23 to determine if Pt can return to facility and if IV antibiotics can be given at facility. SS to follow.                  Rosana Cordon, EVELYNW

## 2023-08-01 PROCEDURE — 99232 SBSQ HOSP IP/OBS MODERATE 35: CPT | Performed by: NURSE PRACTITIONER

## 2023-08-01 PROCEDURE — 25010000002 ENOXAPARIN PER 10 MG: Performed by: STUDENT IN AN ORGANIZED HEALTH CARE EDUCATION/TRAINING PROGRAM

## 2023-08-01 PROCEDURE — 99232 SBSQ HOSP IP/OBS MODERATE 35: CPT | Performed by: STUDENT IN AN ORGANIZED HEALTH CARE EDUCATION/TRAINING PROGRAM

## 2023-08-01 PROCEDURE — 25010000002 ERTAPENEM PER 500 MG: Performed by: NURSE PRACTITIONER

## 2023-08-01 RX ADMIN — ENOXAPARIN SODIUM 40 MG: 40 INJECTION SUBCUTANEOUS at 21:23

## 2023-08-01 RX ADMIN — FOLIC ACID 1 MG: 5 INJECTION, SOLUTION INTRAMUSCULAR; INTRAVENOUS; SUBCUTANEOUS at 10:06

## 2023-08-01 RX ADMIN — ASPIRIN 81 MG: 81 TABLET, CHEWABLE ORAL at 08:15

## 2023-08-01 RX ADMIN — ATORVASTATIN CALCIUM 40 MG: 40 TABLET, FILM COATED ORAL at 21:23

## 2023-08-01 RX ADMIN — LOSARTAN POTASSIUM 100 MG: 50 TABLET, FILM COATED ORAL at 08:15

## 2023-08-01 RX ADMIN — CARVEDILOL 6.25 MG: 6.25 TABLET, FILM COATED ORAL at 17:08

## 2023-08-01 RX ADMIN — Medication 10 ML: at 21:24

## 2023-08-01 RX ADMIN — Medication 10 ML: at 08:15

## 2023-08-01 RX ADMIN — Medication 10 ML: at 08:16

## 2023-08-01 RX ADMIN — CARVEDILOL 6.25 MG: 6.25 TABLET, FILM COATED ORAL at 08:15

## 2023-08-01 RX ADMIN — TRAMADOL HYDROCHLORIDE 50 MG: 50 TABLET, COATED ORAL at 17:10

## 2023-08-01 RX ADMIN — CHLORDIAZEPOXIDE HYDROCHLORIDE 10 MG: 5 CAPSULE ORAL at 08:15

## 2023-08-01 RX ADMIN — ACETAMINOPHEN 650 MG: 325 TABLET ORAL at 17:57

## 2023-08-01 RX ADMIN — ERTAPENEM SODIUM 1000 MG: 1 INJECTION INTRAMUSCULAR; INTRAVENOUS at 08:15

## 2023-08-01 RX ADMIN — NIFEDIPINE 30 MG: 30 TABLET, EXTENDED RELEASE ORAL at 08:15

## 2023-08-01 NOTE — CASE MANAGEMENT/SOCIAL WORK
Discharge Planning Assessment  LIGIA Mcconnell     Patient Name: Edvin Jiménez  MRN: [id number]  Today's Date: 8/1/2023    Admit Date: 7/27/2023            Discharge Plan       Row Name 08/01/23 1410       Plan    Plan Pt discussed in rounds on this date. Physician considering [facility]. SS spoke with [facility] per [place] who states facility does not accept self pay patients. SS to follow.                 TRAVIS Benedr

## 2023-08-01 NOTE — CASE MANAGEMENT/SOCIAL WORK
Discharge Planning Assessment   Jayesh     Patient Name: Edvin Jiménez  MRN: [id number]  Today's Date: 8/1/2023    Admit Date: 7/27/2023            Discharge Plan       Row Name 08/01/23 1339       Plan    Plan SS attempted to call [facility] [phone] but was not successful. SS contacted [facility] [phone] per [place] states Pt does not have a [facility] plan but suggested SS speak with [facility] care instead. SS to follow.                    EVELYN BenderW

## 2023-08-01 NOTE — PROGRESS NOTES
"    [facility] HOSPITALIST PROGRESS NOTE     Patient Identification:  Name:  Edvin Jiménez  Age:  52 y.o.  Sex:  male  :  1970  MRN:  [id number]  Visit Number:  [id number]  ROOM: [address] [place] [address]     Primary Care Provider:  Provider, No Known     Date of Admission: 2023    Length of stay in inpatient status:  5    Subjective     Chief Compliant:    Chief Complaint   Patient presents with    Alcohol Problem       Patient again having episodes of being verbally abusive toward staff making claims that his thermostat was intentionally set unreasonably high in attempt to try and \"suffocate him\". Yesterday threw his drink across room while MA was present. Otherwise remains compliant with current abx plan with no new complaints. Midline placed yesterday.        Objective     Current Hospital Meds:  aspirin, 81 mg, Oral, Daily  atorvastatin, 40 mg, Oral, Nightly  carvedilol, 6.25 mg, Oral, BID With Meals  enoxaparin, 40 mg, Subcutaneous, Nightly  ertapenem, 1,000 mg, Intravenous, Q24H  losartan, 100 mg, Oral, Q24H  NIFEdipine CC, 30 mg, Oral, Q24H  sodium chloride, 10 mL, Intravenous, Q12H  sodium chloride, 10 mL, Intravenous, Q12H  sodium chloride, 10 mL, Intravenous, Q12H       Current Antimicrobial Therapy:  Anti-Infectives (From admission, onward)      Ordered     Dose/Rate Route Frequency Start Stop    23 0444  ertapenem (INVanz) 1,000 mg in sodium chloride 0.9 % 100 mL IVPB-VTB        Ordering Provider: Liyah Manriquez APRN    1,000 mg  200 mL/hr over 30 Minutes Intravenous Every 24 Hours 23 0800 23 0759    23 1146  piperacillin-tazobactam (ZOSYN) IVPB 3.375 g in 100 mL NS VTB        Ordering Provider: Colby Lam DO    3.375 g  over 30 Minutes Intravenous Once 23 1202 23 1330          Current Diuretic Therapy:  Diuretics (From admission, onward)      None      "     ----------------------------------------------------------------------------------------------------------------------  Vital Signs:  Temp:  [97.6 øF (36.4 øC)-98.8 øF (37.1 øC)] 98 øF (36.7 øC)  Heart Rate:  [67-86] 76  Resp:  [18] 18  BP: (114-155)/(78-96) 155/96  SpO2:  [96 %-99 %] 96 %  on   ;   Device (Oxygen Therapy): room air  Body mass index is 20.85 kg/mý.    Wt Readings from Last 3 Encounters:   08/01/23 64 kg (141 lb 3.2 oz)     Intake & Output (last 3 days)         07/29 0701 07/30 0700 07/30 0701 07/31 0700 07/31 0701 08/01 0700 08/01 0701 08/02 0700    P.O.  720    I.V. (mL/kg)        IV Piggyback 50       Total Intake(mL/kg) 992 (15.8) 600 (9.4) 1070 (16.7) 720 (11.3)    Urine (mL/kg/hr)        Total Output        Net +992 +600 +1070 +720            Urine Unmeasured Occurrence 10 x 10 x 3 x     Stool Unmeasured Occurrence 3 x  0 x           Diet: Regular/House Diet; Texture: Regular Texture (IDDSI 7); Fluid Consistency: Thin (IDDSI 0)  ----------------------------------------------------------------------------------------------------------------------  Physical Exam  Vitals and nursing note reviewed.   Constitutional:       General: He is not in acute distress.  HENT:      Head: Normocephalic and atraumatic.      Mouth/Throat:      Mouth: Mucous membranes are moist.      Pharynx: Oropharynx is clear.   Eyes:      General: No scleral icterus.     Extraocular Movements: Extraocular movements intact.   Cardiovascular:      Rate and Rhythm: Normal rate.      Pulses: Normal pulses.      Heart sounds: No murmur heard.  Pulmonary:      Effort: Pulmonary effort is normal. No respiratory distress.   Abdominal:      General: Abdomen is flat.      Palpations: Abdomen is soft.   Musculoskeletal:      Right lower leg: No edema.      Left lower leg: No edema.   Skin:     General: Skin is warm and dry.      Capillary Refill: Capillary refill takes less than 2 seconds.   Neurological:       General: No focal deficit present.      Mental Status: He is alert and oriented to person, place, and time.   Psychiatric:         Mood and Affect: Mood normal.         Behavior: Behavior normal.     ----------------------------------------------------------------------------------------------------------------------    ----------------------------------------------------------------------------------------------------------------------  LABS:    CBC and coagulation:  Results from last 7 days   Lab Units 07/30/23  0405 07/29/23  0018 07/28/23  0004 07/27/23  1852 07/27/23  1530 07/27/23  1236 07/27/23  1108   PROCALCITONIN ng/mL  --   --   --   --   --  0.39*  --    LACTATE mmol/L  --   --   --  1.6 3.0* 2.3*  --    WBC 10*3/mm3 3.89 5.93 7.12  --   --   --  6.68   HEMOGLOBIN g/dL 8.2* 8.0* 9.5*  --   --   --  10.1*   HEMATOCRIT % 26.4* 25.3* 30.6*  --   --   --  31.3*   MCV fL 81.7 80.6 81.2  --   --   --  78.3*   MCHC g/dL 31.1* 31.6 31.0*  --   --   --  32.3   PLATELETS 10*3/mm3 100* 95* 97*  --   --   --  128*   INR   --   --   --   --   --  1.02  --        Acid/base balance:      Renal and electrolytes:  Results from last 7 days   Lab Units 07/30/23  0405 07/29/23  0018 07/28/23  0004 07/27/23  1852 07/27/23  1108   SODIUM mmol/L 138 135* 133*  --  133*   POTASSIUM mmol/L 4.2 4.2 3.7  --  4.0   MAGNESIUM mg/dL  --   --  2.5 1.4* 1.2*   CHLORIDE mmol/L 103 101 96*  --  92*   CO2 mmol/L 27.3 25.0 23.9  --  29.0   BUN mg/dL 10 11 10  --  8   CREATININE mg/dL 0.87 1.02 1.01  --  0.87   CALCIUM mg/dL 8.4* 8.1* 7.8*  --  8.7   PHOSPHORUS mg/dL  --   --  4.0 4.4  --    GLUCOSE mg/dL 111* 133* 173*  --  128*       Estimated Creatinine Clearance: 89.9 mL/min (by C-G formula based on SCr of 0.87 mg/dL).    Liver and pancreatic function:  Results from last 7 days   Lab Units 07/27/23  1108   ALBUMIN g/dL 3.6   BILIRUBIN mg/dL 0.8   ALK PHOS U/L 190*   AST (SGOT) U/L 190*   ALT (SGPT) U/L 73*   LIPASE U/L 19        Endocrine function:  Lab Results   Component Value Date    HGBA1C 5.60 07/27/2023     Point of care bedside glucose levels:      Glucose levels from the CMP:  Results from last 7 days   Lab Units 07/30/23  0405 07/29/23  0018 07/28/23  0004 07/27/23  1108   GLUCOSE mg/dL 111* 133* 173* 128*       Lab Results   Component Value Date    TSH 1.120 07/27/2023     Cardiac:  Results from last 7 days   Lab Units 07/27/23  1236 07/27/23  1108   HSTROP T ng/L 22* 24*       Results from last 7 days   Lab Units 07/27/23  1236   CHOLESTEROL mg/dL 138   TRIGLYCERIDES mg/dL 53   HDL CHOL mg/dL 110*   LDL CHOL mg/dL 16       Cultures:  Lab Results   Component Value Date    COLORU Red (A) 07/27/2023    CLARITYU Turbid (A) 07/27/2023    PHUR 7.0 07/27/2023    PROTEINUR 77.5 07/27/2023    GLUCOSEU Negative 07/27/2023    KETONESU Negative 07/27/2023    BLOODU Large (3+) (A) 07/27/2023    NITRITEU Negative 07/27/2023    LEUKOCYTESUR Large (3+) (A) 07/27/2023    BILIRUBINUR Small (1+) (A) 07/27/2023    UROBILINOGEN 1.0 E.U./dL 07/27/2023    RBCUA Too Numerous to Count (A) 07/27/2023    WBCUA Too Numerous to Count (A) 07/27/2023    BACTERIA None Seen 07/27/2023     Microbiology Results (last 10 days)       Procedure Component Value - Date/Time    Blood Culture - Blood, Wrist, Right [[id number]]  (Normal) Collected: 07/28/23 1200    Lab Status: Preliminary result Specimen: Blood from Wrist, Right Updated: 08/01/23 1300     Blood Culture No growth at 4 days    Blood Culture - Blood, Arm, Left [[id number]]  (Normal) Collected: 07/28/23 1148    Lab Status: Preliminary result Specimen: Blood from Arm, Left Updated: 08/01/23 1300     Blood Culture No growth at 4 days    Chlamydia trachomatis, Neisseria gonorrhoeae, PCR - Urine, Urine, Random Void [[id number]]  (Normal) Collected: 07/27/23 1855    Lab Status: Final result Specimen: Urine, Random Void Updated: 07/27/23 2031     Chlamydia DNA by PCR Not Detected     Neisseria gonorrhoeae by PCR  Not Detected    Narrative:      PCR testing performed using target DNA sequences.    Blood Culture - Blood, Hand, Left [[id number]]  (Abnormal)  (Susceptibility) Collected: 07/27/23 1239    Lab Status: Final result Specimen: Blood from Hand, Left Updated: 07/30/23 1006     Blood Culture Escherichia coli ESBL     Comment:   Consider infectious disease consult.  Susceptibility results may not correlate to clinical outcomes.  For ESBL-producing infections in the blood, a carbapenem is recommended as first-line therapy for optimal clinical outcomes.        Isolated from Aerobic and Anaerobic Bottles     Gram Stain Anaerobic Bottle Gram negative bacilli      Aerobic Bottle Gram negative bacilli    Susceptibility        Escherichia coli ESBL      SANGEETHA      Ertapenem Susceptible      Meropenem Susceptible                       Susceptibility Comments       Escherichia coli ESBL    Cefazolin sensitivity will not be reported for Enterobacteriaceae in non-urine isolates. If cefazolin is preferred, please call the microbiology lab to request an E-test.  With the exception of urinary-sourced infections, aminoglycosides should not be used as monotherapy.               Blood Culture ID, PCR - Blood, Hand, Left [[id number]]  (Abnormal) Collected: 07/27/23 1239    Lab Status: Final result Specimen: Blood from Hand, Left Updated: 07/28/23 0423     BCID, PCR Escherichia coli. Identification by BCID2 PCR.     BCID, PCR 2 CTX-M (ESBL) detected. Identification by BCID2 PCR     BOTTLE TYPE Aerobic Bottle    Blood Culture - Blood, Arm, Left [[id number]]  (Abnormal) Collected: 07/27/23 1236    Lab Status: Final result Specimen: Blood from Arm, Left Updated: 07/30/23 1006     Blood Culture Escherichia coli ESBL     Comment:   Consider infectious disease consult.  Susceptibility results may not correlate to clinical outcomes.  For ESBL-producing infections in the blood, a carbapenem is recommended as first-line therapy for optimal clinical  outcomes.        Isolated from Aerobic and Anaerobic Bottles     Gram Stain Aerobic Bottle Gram negative bacilli      Anaerobic Bottle Gram negative bacilli    Narrative:      Refer to previous blood culture collected on 07/27/2023 1239 for MICs.    COVID PRE-OP / PRE-PROCEDURE SCREENING ORDER (NO ISOLATION) - Swab, Nasopharynx [[id number]]  (Normal) Collected: 07/27/23 1129    Lab Status: Final result Specimen: Swab from Nasopharynx Updated: 07/27/23 1250    Narrative:      The following orders were created for panel order COVID PRE-OP / PRE-PROCEDURE SCREENING ORDER (NO ISOLATION) - Swab, Nasopharynx.  Procedure                               Abnormality         Status                     ---------                               -----------         ------                     COVID-19,CEPHEID/ROSE,CO...[[id number]]  Normal              Final result                 Please view results for these tests on the individual orders.    COVID-19,CEPHEID/ROSE,COR/AVELINA/PAD/BECKY/MAD IN-HOUSE(OR EMERGENT/ADD-ON),NP SWAB IN TRANSPORT MEDIA 3-4 HR TAT, RT-PCR - Swab, Nasopharynx [[id number]]  (Normal) Collected: 07/27/23 1129    Lab Status: Final result Specimen: Swab from Nasopharynx Updated: 07/27/23 1250     COVID19 Not Detected    Narrative:      Fact sheet for providers: https://www.fda.gov/media/052231/download     Fact sheet for patients: https://www.fda.gov/media/599791/download  Fact sheet for providers: https://www.fda.gov/media/785630/download    Fact sheet for patients: https://www.fda.gov/media/198876/download    Test performed by PCR.    Urine Culture - Urine, Urine, Clean Catch [[id number]]  (Abnormal)  (Susceptibility) Collected: 07/27/23 1124    Lab Status: Final result Specimen: Urine, Clean Catch Updated: 07/29/23 0457     Urine Culture >100,000 CFU/mL Escherichia coli ESBL     Comment:   Consider infectious disease consult.  Susceptibility results may not correlate to clinical outcomes.       Narrative:       Colonization of the urinary tract without infection is common. Treatment is discouraged unless the patient is symptomatic, pregnant, or undergoing an invasive urologic procedure.  Recent outcomes data supports the use of pip/tazo in the treatment of susceptible ESBL infections for uncomplicated UTI. Consider use of pip/tazo as a carbapenem-sparing regimen in applicable patients.    Susceptibility        Escherichia coli ESBL      SANGEETHA      Amikacin Susceptible      Ertapenem Susceptible      Gentamicin Resistant      Levofloxacin Resistant      Meropenem Susceptible      Nitrofurantoin Intermediate      Piperacillin + Tazobactam Susceptible      Tobramycin Resistant      Trimethoprim + Sulfamethoxazole Resistant                                   No results found for: PREGTESTUR, PREGSERUM, HCG, HCGQUANT  Pain Management Panel          Latest Ref Rng & Units 7/27/2023   Pain Management Panel   Creatinine, Urine mg/dL 47.4    Amphetamine, Urine Qual Negative Negative    Barbiturates Screen, Urine Negative Negative    Benzodiazepine Screen, Urine Negative Negative    Buprenorphine, Screen, Urine Negative Negative    Cocaine Screen, Urine Negative Negative    Fentanyl, Urine Negative Negative    Methadone Screen , Urine Negative Negative    Methamphetamine, Ur Negative Negative      I have personally looked at the labs and they are summarized above.  ----------------------------------------------------------------------------------------------------------------------  Detailed radiology reports for the last 24 hours:    Imaging Results (Last 24 Hours)       ** No results found for the last 24 hours. **            Assessment & Plan      #Sepsis due to ESBL E. coli bacteremia  #Urinary tract infection ESBL E. coli, POA  -CT a/p w/cystitis noted. UA with hematuria, pyuria. Urine cx ESBL e/coli sensitive to ertapenem  -7/27 BCX ESBL, 7/28 bcx NGTD  -ID following, midline placed 7/31  -Cont ertapenem until 8/11, unfortunately  no PO options available  -Inpatient [facility] consult placed to cont IV abx, not accepting self pay patients. Will likely have to remain inpatient for duration of treatment.     #Acute alcohol withdrawal, exaggerated for secondary gain  #Drug-seeking behavior  #Elevated transaminases  #Chronic pancreatitis  #Alcoholic cirrhosis, early  -Librium taper completed, cont PO vitamin supplementation after completion of IV load  -Plans to try to discharge back to rehab in [place], otherwise no stable housing currently    #Microcytic anemia, suspect chronic  #Thrombocytopenia  -Stable    #Hypertensive urgency, POA   -Improved with carvedilol, increased ARB dose for proteinuria and htn    #Myocardial injury secondary to sepsis  -CP Resolved, Echo w/EF 51-55%    #Pulmonary nodule, needs outpatient follow-up in 3 months  #Proteinuria, protein creatinine ratio 1.5 g, continue new losartan dose  #Ascending aortic ectasia 3.9 cm noted on CT angiogram, outpatient follow-up  #HCV antibody positive, RNA quant sent and pending  #Chronic pancreatitis due to EtOH use, supportive care  #Hypovolemic hyponatremia, not clinically significant  #Hypomagnesemia, replace per protocol    VTE Prophylaxis:   Mechanical Order History:       None          Pharmalogical Order History:        Ordered     Dose Route Frequency Stop    07/27/23 1811  Enoxaparin Sodium (LOVENOX) syringe 40 mg         40 mg SC Nightly --                    Code Status and Medical Interventions:   Ordered at: 07/27/23 1358     Code Status (Patient has no pulse and is not breathing):    CPR (Attempt to Resuscitate)     Medical Interventions (Patient has pulse or is breathing):    Full Support     Release to patient:    Routine Release         Disposition: Cont inpatient IV abx    I have reviewed any copied/forwarded text or data, verified its accuracy, and updated as necessary above.    Tavo Davis MD  [facility] Hospitalist  08/01/23  14:55 EDT

## 2023-08-01 NOTE — CASE MANAGEMENT/SOCIAL WORK
Discharge Planning Assessment   Jayesh     Patient Name: Edvin Jiménez  MRN: [id number]  Today's Date: 8/1/2023    Admit Date: 7/27/2023     Discharge Plan       Row Name 08/01/23 1805       Plan    Plan SS per Kana visited pt at bedside. Pt voices that he was transported here by [facility] in [place], [place] (approximately 100 miles away) after being accepted there for substance abuse treatment. Pt voices that he applied for [place] Medicaid after admission at [facility]. Pt states he was only at [facility] for approximately 1-2 days and prior to had been homeless in the [place], [place] area for 4-5 years. Pt informed SS that he has family in the [place], [place], however has been estranged from them for several years. Pt voices wanting to return to [facility]. Pt was screaming out for the RN to bring pain medication. RN and lead RN was notified. SS contacted [facility] intake at [phone] and was unable to verify if pt had came from there, nor if pt could return. SS recommends a psych consult. SS discussed pt with Dr. Davis and psych consult has been ordered. SS will follow up with [facility] ([place] Medicaid) and [facility] on 8/2/23. SS to follow.               Expected Discharge Date and Time       Expected Discharge Date Expected Discharge Time    Aug 4, 2023      DIPTI Bailon

## 2023-08-01 NOTE — PROGRESS NOTES
Antimicrobial Length of Therapy:    Day 5 of 14 Ertapenem     Jarad Gallego, KristopherD  08/01/23  08:34 EDT

## 2023-08-01 NOTE — PROGRESS NOTES
PROGRESS NOTE         Patient Identification:  Name:  Edvin Jiménez  Age:  52 y.o.  Sex:  male  :  1970  MRN:  [id number]  Visit Number:  [id number]  Primary Care Provider:  Provider, No Known         LOS: 5 days       ----------------------------------------------------------------------------------------------------------------------  Subjective       Chief Complaints:    Alcohol Problem        Interval History:      Patient resting comfortably in bed this morning.  Overall feels better today.  Flank pain and left upper quadrant abdominal pain have resolved.  Currently on room air with no apparent distress.  Lungs clear to auscultation bilaterally.  Abdomen soft, nontender.  Blood cultures from 2023 show no growth thus far.    Review of Systems:    Constitutional: no fever, chills and night sweats.    Eyes: no eye drainage, itching or redness.  HEENT: no mouth sores, dysphagia or nose bleed.  Respiratory: no for shortness of breath, cough or production of sputum.  Cardiovascular: no chest pain, no palpitations, no orthopnea.  Gastrointestinal: no nausea, vomiting or diarrhea. No abdominal pain, hematemesis or rectal bleeding.  Genitourinary: no dysuria or polyuria.  Hematologic/lymphatic: no lymph node abnormalities, no easy bruising or easy bleeding.  Musculoskeletal: no muscle or joint pain.  Skin: No rash and no itching.  Neurological: no loss of consciousness, no seizure, no headache.  Behavioral/Psych: no depression or suicidal ideation.  Endocrine: no hot flashes.  Immunologic: negative.    ----------------------------------------------------------------------------------------------------------------------      Objective       [facility] Meds:  aspirin, 81 mg, Oral, Daily  atorvastatin, 40 mg, Oral, Nightly  carvedilol, 6.25 mg, Oral, BID With Meals  enoxaparin, 40 mg, Subcutaneous, Nightly  ertapenem, 1,000 mg, Intravenous, Q24H  losartan, 100 mg, Oral, Q24H  NIFEdipine  CC, 30 mg, Oral, Q24H  sodium chloride, 10 mL, Intravenous, Q12H  sodium chloride, 10 mL, Intravenous, Q12H  sodium chloride, 10 mL, Intravenous, Q12H         ----------------------------------------------------------------------------------------------------------------------    Vital Signs:  Temp:  [97.6 øF (36.4 øC)-98.8 øF (37.1 øC)] 98 øF (36.7 øC)  Heart Rate:  [67-95] 76  Resp:  [18] 18  BP: (114-155)/() 155/96  Mean Arterial Pressure (Non-Invasive) for the past 24 hrs (Last 3 readings):   Noninvasive MAP (mmHg)   08/01/23 0654 116   08/01/23 0349 93   07/31/23 2329 104       SpO2 Percentage    07/31/23 2329 08/01/23 0349 08/01/23 0654   SpO2: 97% 97% 96%     SpO2:  [96 %-99 %] 96 %  on   ;   Device (Oxygen Therapy): room air    Body mass index is 20.85 kg/mý.  Wt Readings from Last 3 Encounters:   08/01/23 64 kg (141 lb 3.2 oz)        Intake/Output Summary (Last 24 hours) at 8/1/2023 1110  Last data filed at 8/1/2023 0800  Gross per 24 hour   Intake 1070 ml   Output --   Net 1070 ml       Diet: Regular/House Diet; Texture: Regular Texture (IDDSI 7); Fluid Consistency: Thin (IDDSI 0)  ----------------------------------------------------------------------------------------------------------------------      Physical Exam:    Constitutional:  Well-developed and well-nourished.  -American male.  Currently on room air with no apparent distress.  Awake and alert this morning.  HENT:  Head: Normocephalic and atraumatic.  Mouth:  Moist mucous membranes.    Eyes:  Conjunctivae and EOM are normal.  No scleral icterus.  Neck:  Neck supple.  No JVD present.    Cardiovascular:  Normal rate, regular rhythm and normal heart sounds with no murmur. No edema.  Pulmonary/Chest:  No respiratory distress, no wheezes, no crackles, with normal breath sounds and good air movement.  Lungs clear to auscultation bilaterally.  Abdominal:  Soft.  Bowel sounds are normal.  No distension and no tenderness.   Musculoskeletal:   SIUH No edema, no tenderness, and no deformity.  No swelling or redness of joints.  Neurological:  Alert and oriented to person, place, and time.  No facial droop.  No slurred speech.   Skin:  Skin is warm and dry.  No rash noted.  No pallor.   Psychiatric:  Normal mood and affect.  Behavior is normal.        ----------------------------------------------------------------------------------------------------------------------  Results from last 7 days   Lab Units 07/27/23  1236 07/27/23  1108   HSTROP T ng/L 22* 24*         Results from last 7 days   Lab Units 07/27/23  1236   CHOLESTEROL mg/dL 138   TRIGLYCERIDES mg/dL 53   HDL CHOL mg/dL 110*   LDL CHOL mg/dL 16         Results from last 7 days   Lab Units 07/30/23  0405 07/29/23  0018 07/28/23  0004 07/27/23 1852 07/27/23  1530 07/27/23  1236   LACTATE mmol/L  --   --   --  1.6 3.0* 2.3*   WBC 10*3/mm3 3.89 5.93 7.12  --   --   --    HEMOGLOBIN g/dL 8.2* 8.0* 9.5*  --   --   --    HEMATOCRIT % 26.4* 25.3* 30.6*  --   --   --    MCV fL 81.7 80.6 81.2  --   --   --    MCHC g/dL 31.1* 31.6 31.0*  --   --   --    PLATELETS 10*3/mm3 100* 95* 97*  --   --   --    INR   --   --   --   --   --  1.02       Results from last 7 days   Lab Units 07/30/23  0405 07/29/23  0018 07/28/23  0004 07/27/23  1852 07/27/23  1108   SODIUM mmol/L 138 135* 133*  --  133*   POTASSIUM mmol/L 4.2 4.2 3.7  --  4.0   MAGNESIUM mg/dL  --   --  2.5 1.4* 1.2*   CHLORIDE mmol/L 103 101 96*  --  92*   CO2 mmol/L 27.3 25.0 23.9  --  29.0   BUN mg/dL 10 11 10  --  8   CREATININE mg/dL 0.87 1.02 1.01  --  0.87   CALCIUM mg/dL 8.4* 8.1* 7.8*  --  8.7   GLUCOSE mg/dL 111* 133* 173*  --  128*   ALBUMIN g/dL  --   --   --   --  3.6   BILIRUBIN mg/dL  --   --   --   --  0.8   ALK PHOS U/L  --   --   --   --  190*   AST (SGOT) U/L  --   --   --   --  190*   ALT (SGPT) U/L  --   --   --   --  73*     Estimated Creatinine Clearance: 89.9 mL/min (by C-G formula based on SCr of 0.87 mg/dL).  No results found for:  AMMONIA    No results found for: HGBA1C, POCGLU    Lab Results   Component Value Date    HGBA1C 5.60 07/27/2023     Lab Results   Component Value Date    TSH 1.120 07/27/2023       Blood Culture   Date Value Ref Range Status   07/27/2023 Gram Negative Bacilli (C)  Preliminary   07/27/2023 Gram Negative Bacilli (C)  Preliminary     Urine Culture   Date Value Ref Range Status   07/27/2023 >100,000 CFU/mL Escherichia coli ESBL (A)  Final     Comment:       Consider infectious disease consult.  Susceptibility results may not correlate to clinical outcomes.     No results found for: WOUNDCX  No results found for: STOOLCX  No results found for: RESPCX  Pain Management Panel          Latest Ref Rng & Units 7/27/2023   Pain Management Panel   Creatinine, Urine mg/dL 47.4    Amphetamine, Urine Qual Negative Negative    Barbiturates Screen, Urine Negative Negative    Benzodiazepine Screen, Urine Negative Negative    Buprenorphine, Screen, Urine Negative Negative    Cocaine Screen, Urine Negative Negative    Fentanyl, Urine Negative Negative    Methadone Screen , Urine Negative Negative    Methamphetamine, Ur Negative Negative    ----------------------------------------------------------------------------------------------------------------------  Imaging Results (Last 24 Hours)       ** No results found for the last 24 hours. **            ----------------------------------------------------------------------------------------------------------------------    Pertinent Infectious Disease Results      Presented to [facility] Emergency Department on 7/27/2023 for chest pain and vomiting. Chlamydia and gonorrhea negative.  COVID-19 PCR negative.  Procalcitonin slightly elevated at 0.39.  Lactic acid elevated at 2.3 on admission.  Blood cultures from 7/27/2023 2 out of 2 sets positive for ESBL E. coli.  HIV negative.  Hepatitis C reactive.           Assessment/Plan       Assessment     Severe sepsis with lactic acid  greater than 2 on admission  ESBL bacteremia  Possible cystitis        Plan      Patient resting comfortably in bed this morning.  Overall feels better today.  Flank pain and left upper quadrant abdominal pain have resolved.  Currently on room air with no apparent distress.  Lungs clear to auscultation bilaterally.  Abdomen soft, nontender.  Blood cultures from 7/28/2023 show no growth thus far.    Urine culture from 7/27/2023 finalizes greater than 100,000 colonies of E. coli ESBL.     Based on finalization of culture results patient require 2-week course of ertapenem 1 g IV every 24 hours for treatment of ESBL bacteremia with urinary source through 8/11/2023.  We will continue to follow closely and adjust antibiotic therapy as needed.      ANTIMICROBIAL THERAPY    ertapenem (INVanz) 1000 mg in 100ml NS VTB     Code Status:   Code Status and Medical Interventions:   Ordered at: 07/27/23 1358     Code Status (Patient has no pulse and is not breathing):    CPR (Attempt to Resuscitate)     Medical Interventions (Patient has pulse or is breathing):    Full Support     Release to patient:    Routine Release       YISSEL Gómez  08/01/23  11:10 EDT

## 2023-08-01 NOTE — PLAN OF CARE
Goal Outcome Evaluation:              Outcome Evaluation: Pt is a transfer from  this shift. Pt has been verbally assaultive and demanding to staff. Pt is complaining of pain, PRN medication given per MAR and Dr. Susan pérez. Will continue plan of care.

## 2023-08-01 NOTE — PLAN OF CARE
Goal Outcome Evaluation:  Patient is resting in bed watching television. Patient A&Ox4. Patient is currently on room air. No signs of acute distress noted. No complaints or concerns at this time. Will continue to follow plan of care.

## 2023-08-02 LAB
ALBUMIN SERPL-MCNC: 2.9 G/DL (ref 3.5–5.2)
ALBUMIN/GLOB SERPL: 0.7 G/DL
ALP SERPL-CCNC: 154 U/L (ref 39–117)
ALT SERPL W P-5'-P-CCNC: 33 U/L (ref 1–41)
ANION GAP SERPL CALCULATED.3IONS-SCNC: 10 MMOL/L (ref 5–15)
AST SERPL-CCNC: 57 U/L (ref 1–40)
BACTERIA SPEC AEROBE CULT: NORMAL
BACTERIA SPEC AEROBE CULT: NORMAL
BASOPHILS # BLD AUTO: 0.05 10*3/MM3 (ref 0–0.2)
BASOPHILS NFR BLD AUTO: 1.1 % (ref 0–1.5)
BILIRUB SERPL-MCNC: 0.3 MG/DL (ref 0–1.2)
BUN SERPL-MCNC: 11 MG/DL (ref 6–20)
BUN/CREAT SERPL: 10.8 (ref 7–25)
CALCIUM SPEC-SCNC: 8.7 MG/DL (ref 8.6–10.5)
CHLORIDE SERPL-SCNC: 99 MMOL/L (ref 98–107)
CO2 SERPL-SCNC: 27 MMOL/L (ref 22–29)
CREAT SERPL-MCNC: 1.02 MG/DL (ref 0.76–1.27)
DEPRECATED RDW RBC AUTO: 58.4 FL (ref 37–54)
DIAGNOSTIC IMP SPEC-IMP: NORMAL
EGFRCR SERPLBLD CKD-EPI 2021: 88.4 ML/MIN/1.73
EOSINOPHIL # BLD AUTO: 0.03 10*3/MM3 (ref 0–0.4)
EOSINOPHIL NFR BLD AUTO: 0.6 % (ref 0.3–6.2)
ERYTHROCYTE [DISTWIDTH] IN BLOOD BY AUTOMATED COUNT: 19.9 % (ref 12.3–15.4)
GLOBULIN UR ELPH-MCNC: 4.3 GM/DL
GLUCOSE BLDC GLUCOMTR-MCNC: 112 MG/DL (ref 70–130)
GLUCOSE SERPL-MCNC: 117 MG/DL (ref 65–99)
HCT VFR BLD AUTO: 27.8 % (ref 37.5–51)
HCV IGG SERPL QL IA: REACTIVE
HCV RNA SERPL NAA+PROBE-ACNC: NORMAL IU/ML
HCV RNA SERPL NAA+PROBE-LOG IU: 5.21 LOG10 IU/ML
HGB BLD-MCNC: 8.7 G/DL (ref 13–17.7)
IMM GRANULOCYTES # BLD AUTO: 0.05 10*3/MM3 (ref 0–0.05)
IMM GRANULOCYTES NFR BLD AUTO: 1.1 % (ref 0–0.5)
LIPASE SERPL-CCNC: 18 U/L (ref 13–60)
LYMPHOCYTES # BLD AUTO: 1.42 10*3/MM3 (ref 0.7–3.1)
LYMPHOCYTES NFR BLD AUTO: 30.5 % (ref 19.6–45.3)
MCH RBC QN AUTO: 25.6 PG (ref 26.6–33)
MCHC RBC AUTO-ENTMCNC: 31.3 G/DL (ref 31.5–35.7)
MCV RBC AUTO: 81.8 FL (ref 79–97)
MONOCYTES # BLD AUTO: 0.81 10*3/MM3 (ref 0.1–0.9)
MONOCYTES NFR BLD AUTO: 17.4 % (ref 5–12)
NEUTROPHILS NFR BLD AUTO: 2.3 10*3/MM3 (ref 1.7–7)
NEUTROPHILS NFR BLD AUTO: 49.3 % (ref 42.7–76)
NRBC BLD AUTO-RTO: 0 /100 WBC (ref 0–0.2)
PLATELET # BLD AUTO: 180 10*3/MM3 (ref 140–450)
PMV BLD AUTO: 9.9 FL (ref 6–12)
POTASSIUM SERPL-SCNC: 4.2 MMOL/L (ref 3.5–5.2)
PROT SERPL-MCNC: 7.2 G/DL (ref 6–8.5)
RBC # BLD AUTO: 3.4 10*6/MM3 (ref 4.14–5.8)
REF LAB TEST REF RANGE: NORMAL
SODIUM SERPL-SCNC: 136 MMOL/L (ref 136–145)
WBC NRBC COR # BLD: 4.66 10*3/MM3 (ref 3.4–10.8)

## 2023-08-02 PROCEDURE — 94799 UNLISTED PULMONARY SVC/PX: CPT

## 2023-08-02 PROCEDURE — 83690 ASSAY OF LIPASE: CPT | Performed by: STUDENT IN AN ORGANIZED HEALTH CARE EDUCATION/TRAINING PROGRAM

## 2023-08-02 PROCEDURE — 25010000002 ENOXAPARIN PER 10 MG: Performed by: STUDENT IN AN ORGANIZED HEALTH CARE EDUCATION/TRAINING PROGRAM

## 2023-08-02 PROCEDURE — 80053 COMPREHEN METABOLIC PANEL: CPT | Performed by: STUDENT IN AN ORGANIZED HEALTH CARE EDUCATION/TRAINING PROGRAM

## 2023-08-02 PROCEDURE — 25010000002 ERTAPENEM PER 500 MG: Performed by: NURSE PRACTITIONER

## 2023-08-02 PROCEDURE — 82948 REAGENT STRIP/BLOOD GLUCOSE: CPT

## 2023-08-02 PROCEDURE — 99231 SBSQ HOSP IP/OBS SF/LOW 25: CPT | Performed by: STUDENT IN AN ORGANIZED HEALTH CARE EDUCATION/TRAINING PROGRAM

## 2023-08-02 PROCEDURE — 99232 SBSQ HOSP IP/OBS MODERATE 35: CPT | Performed by: NURSE PRACTITIONER

## 2023-08-02 PROCEDURE — 85025 COMPLETE CBC W/AUTO DIFF WBC: CPT | Performed by: STUDENT IN AN ORGANIZED HEALTH CARE EDUCATION/TRAINING PROGRAM

## 2023-08-02 PROCEDURE — 99253 IP/OBS CNSLTJ NEW/EST LOW 45: CPT | Performed by: PSYCHIATRY & NEUROLOGY

## 2023-08-02 RX ORDER — QUETIAPINE FUMARATE 25 MG/1
50 TABLET, FILM COATED ORAL EVERY 12 HOURS PRN
Status: DISCONTINUED | OUTPATIENT
Start: 2023-08-02 | End: 2023-08-10 | Stop reason: HOSPADM

## 2023-08-02 RX ADMIN — CARVEDILOL 6.25 MG: 6.25 TABLET, FILM COATED ORAL at 17:19

## 2023-08-02 RX ADMIN — TRAMADOL HYDROCHLORIDE 50 MG: 50 TABLET, COATED ORAL at 14:50

## 2023-08-02 RX ADMIN — ATORVASTATIN CALCIUM 40 MG: 40 TABLET, FILM COATED ORAL at 20:50

## 2023-08-02 RX ADMIN — ERTAPENEM SODIUM 1000 MG: 1 INJECTION INTRAMUSCULAR; INTRAVENOUS at 08:45

## 2023-08-02 RX ADMIN — CARVEDILOL 6.25 MG: 6.25 TABLET, FILM COATED ORAL at 08:43

## 2023-08-02 RX ADMIN — NIFEDIPINE 30 MG: 30 TABLET, EXTENDED RELEASE ORAL at 08:43

## 2023-08-02 RX ADMIN — QUETIAPINE FUMARATE 50 MG: 25 TABLET, FILM COATED ORAL at 17:19

## 2023-08-02 RX ADMIN — Medication 10 ML: at 10:18

## 2023-08-02 RX ADMIN — IBUPROFEN 800 MG: 400 TABLET, FILM COATED ORAL at 21:03

## 2023-08-02 RX ADMIN — ENOXAPARIN SODIUM 40 MG: 40 INJECTION SUBCUTANEOUS at 20:50

## 2023-08-02 RX ADMIN — LOSARTAN POTASSIUM 100 MG: 50 TABLET, FILM COATED ORAL at 08:43

## 2023-08-02 RX ADMIN — ASPIRIN 81 MG: 81 TABLET, CHEWABLE ORAL at 08:43

## 2023-08-02 RX ADMIN — Medication 10 ML: at 21:11

## 2023-08-02 RX ADMIN — TRAMADOL HYDROCHLORIDE 50 MG: 50 TABLET, COATED ORAL at 08:43

## 2023-08-02 NOTE — NURSING NOTE
Patient has refused a bath/shower. Patient has been educated on importance of daily bathing to prevent infections.

## 2023-08-02 NOTE — CASE MANAGEMENT/SOCIAL WORK
Discharge Planning Assessment   Jayesh     Patient Name: Edvin Jiménez  MRN: [id number]  Today's Date: 8/2/2023    Admit Date: 7/27/2023     Discharge Plan       Row Name 08/02/23 1015       Plan    Plan SS contacted [facility] ([place]) [phone] per Damir who states there is no application pending at all for [place] Medicaid. SS contacted [facility] intake [phone] per Jackelyn who states she is unable to verify if pt was the facility. SS voiced if a new referral could be sent to facility per Jackelyn who states she would have to speak with pt to have questions completed. SS to follow.    13:00: SS and CM director spoke with pt at bedside on this date. Pt states he came from [facility] and wants to return back to facility. SS contacted [facility] [phone] per Jackelyn who states they have no record on file of pt being at the [facility] and [facility] cannot accept pt's on IV abx. SS and CM working help pt complete [place] Medicaid application. SS attempted to contact the [facility] [phone]. SS left voicemail to return call. Pt states he is agreeable for other detox facilities in the [place] area. SS to follow.     16:14: CM help pt complete [facility] Application and SS faxed application to [phone]. SS to follow up on 8/3/23.                TRAVIS Jauregui

## 2023-08-02 NOTE — PLAN OF CARE
Goal Outcome Evaluation:           Progress: improving  Outcome Evaluation: Pt. restless and agitated this shift. Pt verbally abusive to staff, security called to bedside. pt is now resting in bed at this time. VSS, no acute changes at this time, will continue with plan of care.

## 2023-08-02 NOTE — PROGRESS NOTES
PROGRESS NOTE         Patient Identification:  Name:  Edvin Jiménez  Age:  52 y.o.  Sex:  male  :  1970  MRN:  [id number]  Visit Number:  [id number]  Primary Care Provider:  Provider, No Known         LOS: 6 days       ----------------------------------------------------------------------------------------------------------------------  Subjective       Chief Complaints:    Alcohol Problem        Interval History:      Patient resting comfortably in bed this morning.  Reports improved abdominal pain today.  Currently on room air with no apparent distress.  Reports increased appetite.  Lungs clear to auscultation bilaterally.  Abdomen soft, nontender.  WBC normal at 4.66.    Review of Systems:    Constitutional: no fever, chills and night sweats.    Eyes: no eye drainage, itching or redness.  HEENT: no mouth sores, dysphagia or nose bleed.  Respiratory: no for shortness of breath, cough or production of sputum.  Cardiovascular: no chest pain, no palpitations, no orthopnea.  Gastrointestinal: no nausea, vomiting or diarrhea. No abdominal pain, hematemesis or rectal bleeding.  Genitourinary: no dysuria or polyuria.  Hematologic/lymphatic: no lymph node abnormalities, no easy bruising or easy bleeding.  Musculoskeletal: no muscle or joint pain.  Skin: No rash and no itching.  Neurological: no loss of consciousness, no seizure, no headache.  Behavioral/Psych: no depression or suicidal ideation.  Endocrine: no hot flashes.  Immunologic: negative.    ----------------------------------------------------------------------------------------------------------------------      Objective       Current [facility] Meds:  aspirin, 81 mg, Oral, Daily  atorvastatin, 40 mg, Oral, Nightly  carvedilol, 6.25 mg, Oral, BID With Meals  enoxaparin, 40 mg, Subcutaneous, Nightly  ertapenem, 1,000 mg, Intravenous, Q24H  losartan, 100 mg, Oral, Q24H  NIFEdipine CC, 30 mg, Oral, Q24H  sodium chloride, 10 mL, Intravenous,  Q12H  sodium chloride, 10 mL, Intravenous, Q12H  sodium chloride, 10 mL, Intravenous, Q12H         ----------------------------------------------------------------------------------------------------------------------    Vital Signs:  Temp:  [98.2 øF (36.8 øC)-98.6 øF (37 øC)] 98.2 øF (36.8 øC)  Heart Rate:  [82-84] 84  Resp:  [18] 18  BP: (156-180)/(108-120) 156/115  No data found.    SpO2 Percentage    08/01/23 0654 08/02/23 0735 08/02/23 0841   SpO2: 96% 96% 98%     SpO2:  [96 %-98 %] 98 %  on   ;   Device (Oxygen Therapy): room air    Body mass index is 20.82 kg/mý.  Wt Readings from Last 3 Encounters:   08/01/23 64 kg (141 lb)        Intake/Output Summary (Last 24 hours) at 8/2/2023 1117  Last data filed at 8/2/2023 0900  Gross per 24 hour   Intake 840 ml   Output --   Net 840 ml       Diet: Regular/House Diet; Texture: Regular Texture (IDDSI 7); Fluid Consistency: Thin (IDDSI 0)  ----------------------------------------------------------------------------------------------------------------------      Physical Exam:    Constitutional:  Well-developed and well-nourished.  -American male.  Currently on room air with no apparent distress.  Eating breakfast this morning.  HENT:  Head: Normocephalic and atraumatic.  Mouth:  Moist mucous membranes.    Eyes:  Conjunctivae and EOM are normal.  No scleral icterus.  Neck:  Neck supple.  No JVD present.    Cardiovascular:  Normal rate, regular rhythm and normal heart sounds with no murmur. No edema.  Pulmonary/Chest:  No respiratory distress, no wheezes, no crackles, with normal breath sounds and good air movement.  Lungs clear to auscultation bilaterally.  Abdominal:  Soft.  Bowel sounds are normal.  No distension and no tenderness.   Musculoskeletal:  No edema, no tenderness, and no deformity.  No swelling or redness of joints.  Neurological:  Alert and oriented to person, place, and time.  No facial droop.  No slurred speech.   Skin:  Skin is warm and dry.  No  rash noted.  No pallor.   Psychiatric:  Normal mood and affect.  Behavior is normal.        ----------------------------------------------------------------------------------------------------------------------  Results from last 7 days   Lab Units 07/27/23  1236 07/27/23  1108   HSTROP T ng/L 22* 24*         Results from last 7 days   Lab Units 07/27/23  1236   CHOLESTEROL mg/dL 138   TRIGLYCERIDES mg/dL 53   HDL CHOL mg/dL 110*   LDL CHOL mg/dL 16         Results from last 7 days   Lab Units 08/02/23 0014 07/30/23 0405 07/29/23 0018 07/28/23  0004 07/27/23 1852 07/27/23  1530 07/27/23  1236   LACTATE mmol/L  --   --   --   --  1.6 3.0* 2.3*   WBC 10*3/mm3 4.66 3.89 5.93   < >  --   --   --    HEMOGLOBIN g/dL 8.7* 8.2* 8.0*   < >  --   --   --    HEMATOCRIT % 27.8* 26.4* 25.3*   < >  --   --   --    MCV fL 81.8 81.7 80.6   < >  --   --   --    MCHC g/dL 31.3* 31.1* 31.6   < >  --   --   --    PLATELETS 10*3/mm3 180 100* 95*   < >  --   --   --    INR   --   --   --   --   --   --  1.02    < > = values in this interval not displayed.       Results from last 7 days   Lab Units 08/02/23 0014 07/30/23 0405 07/29/23 0018 07/28/23  0004 07/27/23 1852 07/27/23  1108   SODIUM mmol/L 136 138 135* 133*  --  133*   POTASSIUM mmol/L 4.2 4.2 4.2 3.7  --  4.0   MAGNESIUM mg/dL  --   --   --  2.5 1.4* 1.2*   CHLORIDE mmol/L 99 103 101 96*  --  92*   CO2 mmol/L 27.0 27.3 25.0 23.9  --  29.0   BUN mg/dL 11 10 11 10  --  8   CREATININE mg/dL 1.02 0.87 1.02 1.01  --  0.87   CALCIUM mg/dL 8.7 8.4* 8.1* 7.8*  --  8.7   GLUCOSE mg/dL 117* 111* 133* 173*  --  128*   ALBUMIN g/dL 2.9*  --   --   --   --  3.6   BILIRUBIN mg/dL 0.3  --   --   --   --  0.8   ALK PHOS U/L 154*  --   --   --   --  190*   AST (SGOT) U/L 57*  --   --   --   --  190*   ALT (SGPT) U/L 33  --   --   --   --  73*     Estimated Creatinine Clearance: 76.7 mL/min (by C-G formula based on SCr of 1.02 mg/dL).  No results found for: AMMONIA    Glucose   Date/Time  Value Ref Range Status   08/02/2023 0731 112 70 - 130 mg/dL Final     Comment:     Meter: [id number] : [id number] DENNIS ASENCIO       Lab Results   Component Value Date    HGBA1C 5.60 07/27/2023     Lab Results   Component Value Date    TSH 1.120 07/27/2023       Blood Culture   Date Value Ref Range Status   07/27/2023 Gram Negative Bacilli (C)  Preliminary   07/27/2023 Gram Negative Bacilli (C)  Preliminary     Urine Culture   Date Value Ref Range Status   07/27/2023 >100,000 CFU/mL Escherichia coli ESBL (A)  Final     Comment:       Consider infectious disease consult.  Susceptibility results may not correlate to clinical outcomes.     No results found for: WOUNDCX  No results found for: STOOLCX  No results found for: RESPCX  Pain Management Panel          Latest Ref Rng & Units 7/27/2023   Pain Management Panel   Creatinine, Urine mg/dL 47.4    Amphetamine, Urine Qual Negative Negative    Barbiturates Screen, Urine Negative Negative    Benzodiazepine Screen, Urine Negative Negative    Buprenorphine, Screen, Urine Negative Negative    Cocaine Screen, Urine Negative Negative    Fentanyl, Urine Negative Negative    Methadone Screen , Urine Negative Negative    Methamphetamine, Ur Negative Negative    ----------------------------------------------------------------------------------------------------------------------  Imaging Results (Last 24 Hours)       ** No results found for the last 24 hours. **            ----------------------------------------------------------------------------------------------------------------------    Pertinent Infectious Disease Results      Presented to [facility] Emergency Department on 7/27/2023 for chest pain and vomiting. Chlamydia and gonorrhea negative.  COVID-19 PCR negative.  Procalcitonin slightly elevated at 0.39.  Lactic acid elevated at 2.3 on admission.  Blood cultures from 7/27/2023 2 out of 2 sets positive for ESBL E. coli.  HIV negative.  Hepatitis C  reactive.           Assessment/Plan       Assessment     Severe sepsis with lactic acid greater than 2 on admission  ESBL bacteremia  Possible cystitis        Plan      Patient resting comfortably in bed this morning.  Reports improved abdominal pain today.  Currently on room air with no apparent distress.  Reports increased appetite.  Lungs clear to auscultation bilaterally.  Abdomen soft, nontender.  WBC normal at 4.66.    Blood cultures from 7/28/2023 show no growth thus far.    Urine culture from 7/27/2023 finalizes greater than 100,000 colonies of E. coli ESBL.     Based on finalization of culture results patient require 2-week course of ertapenem 1 g IV every 24 hours for treatment of ESBL bacteremia with urinary source through 8/11/2023.  We will continue to follow closely and adjust antibiotic therapy as needed.      ANTIMICROBIAL THERAPY    ertapenem (INVanz) 1000 mg in 100ml NS VTB     Code Status:   Code Status and Medical Interventions:   Ordered at: 07/27/23 1358     Code Status (Patient has no pulse and is not breathing):    CPR (Attempt to Resuscitate)     Medical Interventions (Patient has pulse or is breathing):    Full Support     Release to patient:    Routine Release       YISSEL Gómez  08/02/23  11:17 EDT

## 2023-08-02 NOTE — CONSULTS
Referring Provider: Dr. Davis  Reason for Consultation: agitation      Chief complaint/Focus of Exam: agitation    Subjective .     History of present illness:  Edvin Jiménez is a 52 y.o. male who was admitted on 7/27/2023 with complaints of chest pain and vomiting. He was sent to the hospital by a alcohol and drug rehab facility in [place]. The patient was found to have sepsis due to ESBL E.coli bacteremia. The patient reports a long history of alcohol use and was drinking six quarts of liquor daily prior to coming to the hospital. He endorses tolerance and withdrawals and ongoing use despite negative consequences and is interested in stopping and that is why he went to rehab treatment. His last use was more than five days ago and currently reports no active withdrawals. The patient has exhibited angry and agitated behaviors and there is also concern about symptoms exaggeration subjectively. The patient was seen in his room with staff present. He was resting comfortably and was calm and cooperative. He is alert and oriented to person, place and situation. He denies feeling depressed, anxious, hopeless, or suicidal. No AVH reported. He denies thoughts of harm to anyone else. He admits he tends to get angry when he feels the other person is not paying attention or listening to him. He acknowledges that anger doesn't solve any problems and agreed to avoid getting angry at the staff.     Past Psych History: None reported.     Substance Use History: Alcohol use as in HPI.     Review of Systems  Gen:No fever, chills  Resp: No soa  GI: No nvd    History  History reviewed. No pertinent past medical history., History reviewed. No pertinent surgical history., History reviewed. No pertinent family history.,   Social History     Socioeconomic History    Marital status: Single   Tobacco Use    Smoking status: Some Days     Types: Cigarettes     Passive exposure: Current    Smokeless tobacco: Former     Types: Chew   Vaping  Use    Vaping Use: Never used   Substance and Sexual Activity    Alcohol use: Not Currently     Comment: Pt reports drinking 2-3 pints per day    Drug use: Not Currently     Types: Marijuana    Sexual activity: Yes     E-cigarette/Vaping    E-cigarette/Vaping Use Never User      E-cigarette/Vaping Substances    Nicotine No     THC No     CBD No     Flavoring No      E-cigarette/Vaping Devices    Disposable No     Pre-filled or Refillable Cartridge No     Refillable Tank No     Pre-filled Pod No          ,   No medications prior to admission.   , Scheduled Meds:  aspirin, 81 mg, Oral, Daily  atorvastatin, 40 mg, Oral, Nightly  carvedilol, 6.25 mg, Oral, BID With Meals  enoxaparin, 40 mg, Subcutaneous, Nightly  ertapenem, 1,000 mg, Intravenous, Q24H  losartan, 100 mg, Oral, Q24H  NIFEdipine CC, 30 mg, Oral, Q24H  sodium chloride, 10 mL, Intravenous, Q12H  sodium chloride, 10 mL, Intravenous, Q12H  sodium chloride, 10 mL, Intravenous, Q12H   , Continuous Infusions:   , PRN Meds:    acetaminophen    senna-docusate sodium **AND** polyethylene glycol **AND** bisacodyl **AND** bisacodyl    hydrALAZINE    ibuprofen    lactulose    nitroglycerin    ondansetron ODT    promethazine    sodium chloride    sodium chloride    sodium chloride    sodium chloride    sodium chloride    sodium chloride    sodium chloride    traMADol, and Allergies:  Patient has no known allergies.    Objective     Vital Signs   Temp:  [98.2 øF (36.8 øC)-98.6 øF (37 øC)] 98.2 øF (36.8 øC)  Heart Rate:  [82-84] 84  Resp:  [18] 18  BP: (156-180)/(108-120) 156/115    Mental Status Exam:   Mental Status Exam:    Hygiene:   fair  Cooperation:  Cooperative  Eye Contact:  Fair  Psychomotor Behavior:  Appropriate  Affect:  Appropriate  Hopelessness: Denies  Speech:  Normal  Thought Progress:  Goal directed  Thought Content:  Normal  Suicidal:  None  Homicidal:  None  Hallucinations:  None  Delusion:  None  Memory:  Intact  Orientation:  Person, Place, and  Situation  Reliability:  fair  Insight:  Fair  Judgement:  Fair  Impulse Control:  Poor    Results Review:   I reviewed the patient's new clinical results.  Lab Results (last 24 hours)       Procedure Component Value Units Date/Time    POC Glucose Once [[id number]]  (Normal) Collected: 08/02/23 0731    Specimen: Blood Updated: 08/02/23 0739     Glucose 112 mg/dL      Comment: Meter: [id number] : [id number] DENNIS ASENCIO       Comprehensive Metabolic Panel [[id number]]  (Abnormal) Collected: 08/02/23 0014    Specimen: Blood Updated: 08/02/23 0056     Glucose 117 mg/dL      BUN 11 mg/dL      Creatinine 1.02 mg/dL      Sodium 136 mmol/L      Potassium 4.2 mmol/L      Chloride 99 mmol/L      CO2 27.0 mmol/L      Calcium 8.7 mg/dL      Total Protein 7.2 g/dL      Albumin 2.9 g/dL      ALT (SGPT) 33 U/L      AST (SGOT) 57 U/L      Alkaline Phosphatase 154 U/L      Total Bilirubin 0.3 mg/dL      Globulin 4.3 gm/dL      A/G Ratio 0.7 g/dL      BUN/Creatinine Ratio 10.8     Anion Gap 10.0 mmol/L      eGFR 88.4 mL/min/1.73     Narrative:      GFR Normal >60  Chronic Kidney Disease <60  Kidney Failure <15      Lipase [[id number]]  (Normal) Collected: 08/02/23 0014    Specimen: Blood Updated: 08/02/23 0056     Lipase 18 U/L     CBC & Differential [[id number]]  (Abnormal) Collected: 08/02/23 0014    Specimen: Blood Updated: 08/02/23 0030    Narrative:      The following orders were created for panel order CBC & Differential.  Procedure                               Abnormality         Status                     ---------                               -----------         ------                     CBC Auto Differential[[id number]]        Abnormal            Final result                 Please view results for these tests on the individual orders.    CBC Auto Differential [[id number]]  (Abnormal) Collected: 08/02/23 0014    Specimen: Blood Updated: 08/02/23 0030     WBC 4.66 10*3/mm3      RBC 3.40 10*6/mm3      Hemoglobin 8.7 g/dL       Hematocrit 27.8 %      MCV 81.8 fL      MCH 25.6 pg      MCHC 31.3 g/dL      RDW 19.9 %      RDW-SD 58.4 fl      MPV 9.9 fL      Platelets 180 10*3/mm3      Neutrophil % 49.3 %      Lymphocyte % 30.5 %      Monocyte % 17.4 %      Eosinophil % 0.6 %      Basophil % 1.1 %      Immature Grans % 1.1 %      Neutrophils, Absolute 2.30 10*3/mm3      Lymphocytes, Absolute 1.42 10*3/mm3      Monocytes, Absolute 0.81 10*3/mm3      Eosinophils, Absolute 0.03 10*3/mm3      Basophils, Absolute 0.05 10*3/mm3      Immature Grans, Absolute 0.05 10*3/mm3      nRBC 0.0 /100 WBC     Blood Culture - Blood, Wrist, Right [[id number]]  (Normal) Collected: 07/28/23 1200    Specimen: Blood from Wrist, Right Updated: 08/01/23 1300     Blood Culture No growth at 4 days    Blood Culture - Blood, Arm, Left [[id number]]  (Normal) Collected: 07/28/23 1148    Specimen: Blood from Arm, Left Updated: 08/01/23 1300     Blood Culture No growth at 4 days          Imaging Results (Last 24 Hours)       ** No results found for the last 24 hours. **              Assessment & Plan     Principal Problem:    Alcohol withdrawal     Alcohol use disorder severe  -Patient is not in active withdrawals and last use was five or more days ago and will not need any further detox medications. Avoid benzodiazepines.    Agitation  -Appears to be situational and a behavioral problem and patient is not in delirium or psychosis. Consitent rules and consequences will help.   -May consider quetiapine 50 mg bid for persistent agitation.     I discussed the patient's findings and my recommendations with patient and nursing staff    Margaux Frost MD  08/02/23  10:20 EDT

## 2023-08-02 NOTE — CONSULTS
Awaiting computer/iPad for consult.    Per chart review, symptoms appear more behavioral in nature. Would limit benzos & opiates unless they are absolutely necessary. Could offer quetiapine 25-50mg TID PRN for anxiety/agitation/irritability.

## 2023-08-02 NOTE — PROGRESS NOTES
[facility] HOSPITALIST PROGRESS NOTE     Patient Identification:  Name:  Edvin Jiménez  Age:  52 y.o.  Sex:  male  :  1970  MRN:  [id number]  Visit Number:  [id number]  ROOM: [address] [place] [address]     Primary Care Provider:  Alexandrea Lee Known     Date of Admission: 2023    Length of stay in inpatient status:  6    Subjective     Chief Compliant:    Chief Complaint   Patient presents with    Alcohol Problem       Patient confrontational and disruptive yesterday afternoon prompting psych consult for eval regarding any treatable mood disorder. This amis more pleasant now that he moved rooms with better AC and has been pleasant with all staff interactions. IV abx infusions continuing via midline with PIVs removed yesterday. BP elevated during periods of agitation. No abd pain this am        Objective     Current Hospital Meds:  aspirin, 81 mg, Oral, Daily  atorvastatin, 40 mg, Oral, Nightly  carvedilol, 6.25 mg, Oral, BID With Meals  enoxaparin, 40 mg, Subcutaneous, Nightly  ertapenem, 1,000 mg, Intravenous, Q24H  losartan, 100 mg, Oral, Q24H  NIFEdipine CC, 30 mg, Oral, Q24H  sodium chloride, 10 mL, Intravenous, Q12H  sodium chloride, 10 mL, Intravenous, Q12H  sodium chloride, 10 mL, Intravenous, Q12H       Current Antimicrobial Therapy:  Anti-Infectives (From admission, onward)      Ordered     Dose/Rate Route Frequency Start Stop    23 0444  ertapenem (INVanz) 1,000 mg in sodium chloride 0.9 % 100 mL IVPB-VTB        Ordering Provider: Liyah Manriquez APRN    1,000 mg  200 mL/hr over 30 Minutes Intravenous Every 24 Hours 23 0800 23 0759    23 1146  piperacillin-tazobactam (ZOSYN) IVPB 3.375 g in 100 mL NS VTB        Ordering Provider: Colby Lam DO    3.375 g  over 30 Minutes Intravenous Once 23 1202 23 1330          Current Diuretic Therapy:  Diuretics (From admission, onward)      None           ----------------------------------------------------------------------------------------------------------------------  Vital Signs:  Temp:  [98.2 øF (36.8 øC)-98.6 øF (37 øC)] 98.2 øF (36.8 øC)  Heart Rate:  [82-84] 84  Resp:  [18] 18  BP: (156-180)/(108-120) 156/115  SpO2:  [96 %-98 %] 98 %  on   ;   Device (Oxygen Therapy): room air  Body mass index is 20.82 kg/mý.    Wt Readings from Last 3 Encounters:   08/01/23 64 kg (141 lb)     Intake & Output (last 3 days)         07/30 0701 07/31 0700 07/31 0701 08/01 0700 08/01 0701 08/02 0700 08/02 0701  08/03 0700    P.O. 600 1070 720 480    IV Piggyback        Total Intake(mL/kg) 600 (9.4) 1070 (16.7) 720 (11.3) 480 (7.5)    Net +600 +1070 +720 +480            Urine Unmeasured Occurrence 10 x 3 x  1 x    Stool Unmeasured Occurrence  0 x            Diet: Regular/House Diet; Texture: Regular Texture (IDDSI 7); Fluid Consistency: Thin (IDDSI 0)  ----------------------------------------------------------------------------------------------------------------------  Physical Exam  Vitals and nursing note reviewed.   Constitutional:       General: He is not in acute distress.  HENT:      Head: Normocephalic and atraumatic.      Mouth/Throat:      Mouth: Mucous membranes are moist.      Pharynx: Oropharynx is clear.   Eyes:      General: No scleral icterus.     Extraocular Movements: Extraocular movements intact.   Cardiovascular:      Rate and Rhythm: Normal rate.      Pulses: Normal pulses.      Heart sounds: No murmur heard.  Pulmonary:      Effort: Pulmonary effort is normal. No respiratory distress.   Abdominal:      General: Abdomen is flat.      Palpations: Abdomen is soft.   Musculoskeletal:      Right lower leg: No edema.      Left lower leg: No edema.   Skin:     General: Skin is warm and dry.      Capillary Refill: Capillary refill takes less than 2 seconds.   Neurological:      General: No focal deficit present.      Mental Status: He is alert and oriented  to person, place, and time.   Psychiatric:         Mood and Affect: Mood normal.         Behavior: Behavior normal.     ----------------------------------------------------------------------------------------------------------------------    ----------------------------------------------------------------------------------------------------------------------  LABS:    CBC and coagulation:  Results from last 7 days   Lab Units 08/02/23  0014 07/30/23  0405 07/29/23  0018 07/28/23  0004 07/27/23  1852 07/27/23  1530 07/27/23  1236 07/27/23  1108   PROCALCITONIN ng/mL  --   --   --   --   --   --  0.39*  --    LACTATE mmol/L  --   --   --   --  1.6 3.0* 2.3*  --    WBC 10*3/mm3 4.66 3.89 5.93 7.12  --   --   --  6.68   HEMOGLOBIN g/dL 8.7* 8.2* 8.0* 9.5*  --   --   --  10.1*   HEMATOCRIT % 27.8* 26.4* 25.3* 30.6*  --   --   --  31.3*   MCV fL 81.8 81.7 80.6 81.2  --   --   --  78.3*   MCHC g/dL 31.3* 31.1* 31.6 31.0*  --   --   --  32.3   PLATELETS 10*3/mm3 180 100* 95* 97*  --   --   --  128*   INR   --   --   --   --   --   --  1.02  --        Acid/base balance:      Renal and electrolytes:  Results from last 7 days   Lab Units 08/02/23  0014 07/30/23  0405 07/29/23  0018 07/28/23  0004 07/27/23  1852 07/27/23  1108   SODIUM mmol/L 136 138 135* 133*  --  133*   POTASSIUM mmol/L 4.2 4.2 4.2 3.7  --  4.0   MAGNESIUM mg/dL  --   --   --  2.5 1.4* 1.2*   CHLORIDE mmol/L 99 103 101 96*  --  92*   CO2 mmol/L 27.0 27.3 25.0 23.9  --  29.0   BUN mg/dL 11 10 11 10  --  8   CREATININE mg/dL 1.02 0.87 1.02 1.01  --  0.87   CALCIUM mg/dL 8.7 8.4* 8.1* 7.8*  --  8.7   PHOSPHORUS mg/dL  --   --   --  4.0 4.4  --    GLUCOSE mg/dL 117* 111* 133* 173*  --  128*       Estimated Creatinine Clearance: 76.7 mL/min (by C-G formula based on SCr of 1.02 mg/dL).    Liver and pancreatic function:  Results from last 7 days   Lab Units 08/02/23  0014 07/27/23  1108   ALBUMIN g/dL 2.9* 3.6   BILIRUBIN mg/dL 0.3 0.8   ALK PHOS U/L 154* 190*   AST  (SGOT) U/L 57* 190*   ALT (SGPT) U/L 33 73*   LIPASE U/L 18 19       Endocrine function:  Lab Results   Component Value Date    HGBA1C 5.60 07/27/2023     Point of care bedside glucose levels:  Results from last 7 days   Lab Units 08/02/23  0731   GLUCOSE mg/dL 112     Glucose levels from the CMP:  Results from last 7 days   Lab Units 08/02/23  0014 07/30/23  0405 07/29/23  0018 07/28/23  0004 07/27/23  1108   GLUCOSE mg/dL 117* 111* 133* 173* 128*       Lab Results   Component Value Date    TSH 1.120 07/27/2023     Cardiac:  Results from last 7 days   Lab Units 07/27/23  1236 07/27/23  1108   HSTROP T ng/L 22* 24*       Results from last 7 days   Lab Units 07/27/23  1236   CHOLESTEROL mg/dL 138   TRIGLYCERIDES mg/dL 53   HDL CHOL mg/dL 110*   LDL CHOL mg/dL 16       Cultures:  Lab Results   Component Value Date    COLORU Red (A) 07/27/2023    CLARITYU Turbid (A) 07/27/2023    PHUR 7.0 07/27/2023    PROTEINUR 77.5 07/27/2023    GLUCOSEU Negative 07/27/2023    KETONESU Negative 07/27/2023    BLOODU Large (3+) (A) 07/27/2023    NITRITEU Negative 07/27/2023    LEUKOCYTESUR Large (3+) (A) 07/27/2023    BILIRUBINUR Small (1+) (A) 07/27/2023    UROBILINOGEN 1.0 E.U./dL 07/27/2023    RBCUA Too Numerous to Count (A) 07/27/2023    WBCUA Too Numerous to Count (A) 07/27/2023    BACTERIA None Seen 07/27/2023     Microbiology Results (last 10 days)       Procedure Component Value - Date/Time    Blood Culture - Blood, Wrist, Right [[id number]]  (Normal) Collected: 07/28/23 1200    Lab Status: Preliminary result Specimen: Blood from Wrist, Right Updated: 08/01/23 1300     Blood Culture No growth at 4 days    Blood Culture - Blood, Arm, Left [[id number]]  (Normal) Collected: 07/28/23 1148    Lab Status: Preliminary result Specimen: Blood from Arm, Left Updated: 08/01/23 1300     Blood Culture No growth at 4 days    Chlamydia trachomatis, Neisseria gonorrhoeae, PCR - Urine, Urine, Random Void [[id number]]  (Normal) Collected: 07/27/23  1855    Lab Status: Final result Specimen: Urine, Random Void Updated: 07/27/23 2031     Chlamydia DNA by PCR Not Detected     Neisseria gonorrhoeae by PCR Not Detected    Narrative:      PCR testing performed using target DNA sequences.    Blood Culture - Blood, Hand, Left [[id number]]  (Abnormal)  (Susceptibility) Collected: 07/27/23 1239    Lab Status: Final result Specimen: Blood from Hand, Left Updated: 07/30/23 1006     Blood Culture Escherichia coli ESBL     Comment:   Consider infectious disease consult.  Susceptibility results may not correlate to clinical outcomes.  For ESBL-producing infections in the blood, a carbapenem is recommended as first-line therapy for optimal clinical outcomes.        Isolated from Aerobic and Anaerobic Bottles     Gram Stain Anaerobic Bottle Gram negative bacilli      Aerobic Bottle Gram negative bacilli    Susceptibility        Escherichia coli ESBL      SANGEETHA      Ertapenem Susceptible      Meropenem Susceptible                       Susceptibility Comments       Escherichia coli ESBL    Cefazolin sensitivity will not be reported for Enterobacteriaceae in non-urine isolates. If cefazolin is preferred, please call the microbiology lab to request an E-test.  With the exception of urinary-sourced infections, aminoglycosides should not be used as monotherapy.               Blood Culture ID, PCR - Blood, Hand, Left [[id number]]  (Abnormal) Collected: 07/27/23 1239    Lab Status: Final result Specimen: Blood from Hand, Left Updated: 07/28/23 0423     BCID, PCR Escherichia coli. Identification by BCID2 PCR.     BCID, PCR 2 CTX-M (ESBL) detected. Identification by BCID2 PCR     BOTTLE TYPE Aerobic Bottle    Blood Culture - Blood, Arm, Left [[id number]]  (Abnormal) Collected: 07/27/23 1236    Lab Status: Final result Specimen: Blood from Arm, Left Updated: 07/30/23 1006     Blood Culture Escherichia coli ESBL     Comment:   Consider infectious disease consult.  Susceptibility results may  not correlate to clinical outcomes.  For ESBL-producing infections in the blood, a carbapenem is recommended as first-line therapy for optimal clinical outcomes.        Isolated from Aerobic and Anaerobic Bottles     Gram Stain Aerobic Bottle Gram negative bacilli      Anaerobic Bottle Gram negative bacilli    Narrative:      Refer to previous blood culture collected on 07/27/2023 1239 for MICs.    COVID PRE-OP / PRE-PROCEDURE SCREENING ORDER (NO ISOLATION) - Swab, Nasopharynx [[id number]]  (Normal) Collected: 07/27/23 1129    Lab Status: Final result Specimen: Swab from Nasopharynx Updated: 07/27/23 1250    Narrative:      The following orders were created for panel order COVID PRE-OP / PRE-PROCEDURE SCREENING ORDER (NO ISOLATION) - Swab, Nasopharynx.  Procedure                               Abnormality         Status                     ---------                               -----------         ------                     COVID-19,CEPHEID/ROSE,CO...[[id number]]  Normal              Final result                 Please view results for these tests on the individual orders.    COVID-19,CEPHEID/ROSE,COR/AVELINA/PAD/BECKY/MAD IN-HOUSE(OR EMERGENT/ADD-ON),NP SWAB IN TRANSPORT MEDIA 3-4 HR TAT, RT-PCR - Swab, Nasopharynx [[id number]]  (Normal) Collected: 07/27/23 1129    Lab Status: Final result Specimen: Swab from Nasopharynx Updated: 07/27/23 1250     COVID19 Not Detected    Narrative:      Fact sheet for providers: https://www.fda.gov/media/907325/download     Fact sheet for patients: https://www.fda.gov/media/467432/download  Fact sheet for providers: https://www.fda.gov/media/224670/download    Fact sheet for patients: https://www.fda.gov/media/285531/download    Test performed by PCR.    Urine Culture - Urine, Urine, Clean Catch [[id number]]  (Abnormal)  (Susceptibility) Collected: 07/27/23 1124    Lab Status: Final result Specimen: Urine, Clean Catch Updated: 07/29/23 0457     Urine Culture >100,000 CFU/mL Escherichia coli  ESBL     Comment:   Consider infectious disease consult.  Susceptibility results may not correlate to clinical outcomes.       Narrative:      Colonization of the urinary tract without infection is common. Treatment is discouraged unless the patient is symptomatic, pregnant, or undergoing an invasive urologic procedure.  Recent outcomes data supports the use of pip/tazo in the treatment of susceptible ESBL infections for uncomplicated UTI. Consider use of pip/tazo as a carbapenem-sparing regimen in applicable patients.    Susceptibility        Escherichia coli ESBL      SANGEETHA      Amikacin Susceptible      Ertapenem Susceptible      Gentamicin Resistant      Levofloxacin Resistant      Meropenem Susceptible      Nitrofurantoin Intermediate      Piperacillin + Tazobactam Susceptible      Tobramycin Resistant      Trimethoprim + Sulfamethoxazole Resistant                                   No results found for: PREGTESTUR, PREGSERUM, HCG, HCGQUANT  Pain Management Panel          Latest Ref Rng & Units 7/27/2023   Pain Management Panel   Creatinine, Urine mg/dL 47.4    Amphetamine, Urine Qual Negative Negative    Barbiturates Screen, Urine Negative Negative    Benzodiazepine Screen, Urine Negative Negative    Buprenorphine, Screen, Urine Negative Negative    Cocaine Screen, Urine Negative Negative    Fentanyl, Urine Negative Negative    Methadone Screen , Urine Negative Negative    Methamphetamine, Ur Negative Negative    I have personally looked at the labs and they are summarized above.  ----------------------------------------------------------------------------------------------------------------------  Detailed radiology reports for the last 24 hours:    Imaging Results (Last 24 Hours)       ** No results found for the last 24 hours. **            Assessment & Plan      #Sepsis due to ESBL E. coli bacteremia  #Urinary tract infection ESBL E. coli, POA  -CT a/p w/cystitis noted. UA with hematuria, pyuria. Urine cx  ESBL e/coli sensitive to ertapenem  -7/27 BCX ESBL, 7/28 bcx NGTD  -ID following, midline placed 7/31  -Cont ertapenem until 8/11, unfortunately no PO options available  -Inpatient [facility] consult placed to cont IV abx, not accepting self pay patients. Will likely have to remain inpatient for duration of treatment.     #Acute alcohol withdrawal, exaggerated for secondary gain  #Drug-seeking behavior  #Elevated transaminases  #Chronic pancreatitis  #Alcoholic cirrhosis, early  -Librium taper completed, cont PO vitamin supplementation after completion of IV load  -Plans to try to discharge back to rehab in [place], otherwise no stable housing currently  -Did c/o abd pain 8/1. Repeat lipase wnl and pain resolved this am    #Microcytic anemia, suspect chronic  #Thrombocytopenia  -Stable    #Hypertensive urgency, POA   -Improved with carvedilol, increased ARB dose for proteinuria and htn    #Myocardial injury secondary to sepsis  -CP Resolved, Echo w/EF 51-55%    #Pulmonary nodule, needs outpatient follow-up in 3 months  #Proteinuria, protein creatinine ratio 1.5 g, continue new losartan dose  #Ascending aortic ectasia 3.9 cm noted on CT angiogram, outpatient follow-up  #HCV antibody positive, RNA quant sent and pending  #Chronic pancreatitis due to EtOH use, supportive care  #Hypovolemic hyponatremia, not clinically significant  #Hypomagnesemia, replace per protocol    VTE Prophylaxis:   Mechanical Order History:       None          Pharmalogical Order History:        Ordered     Dose Route Frequency Stop    07/27/23 1811  Enoxaparin Sodium (LOVENOX) syringe 40 mg         40 mg SC Nightly --                    Code Status and Medical Interventions:   Ordered at: 07/27/23 1358     Code Status (Patient has no pulse and is not breathing):    CPR (Attempt to Resuscitate)     Medical Interventions (Patient has pulse or is breathing):    Full Support     Release to patient:    Routine Release         Disposition: Cont inpatient IV  abx, given difficult dispo patient may unfortunately discharge to street following completion     I have reviewed any copied/forwarded text or data, verified its accuracy, and updated as necessary above.    Tavo Davis MD  [facility] Hospitalist  08/02/23  12:29 EDT

## 2023-08-03 PROCEDURE — 25010000002 ERTAPENEM PER 500 MG: Performed by: NURSE PRACTITIONER

## 2023-08-03 PROCEDURE — 99232 SBSQ HOSP IP/OBS MODERATE 35: CPT | Performed by: NURSE PRACTITIONER

## 2023-08-03 PROCEDURE — 63710000001 ONDANSETRON ODT 4 MG TABLET DISPERSIBLE: Performed by: STUDENT IN AN ORGANIZED HEALTH CARE EDUCATION/TRAINING PROGRAM

## 2023-08-03 PROCEDURE — 25010000002 ENOXAPARIN PER 10 MG: Performed by: STUDENT IN AN ORGANIZED HEALTH CARE EDUCATION/TRAINING PROGRAM

## 2023-08-03 PROCEDURE — 99231 SBSQ HOSP IP/OBS SF/LOW 25: CPT | Performed by: STUDENT IN AN ORGANIZED HEALTH CARE EDUCATION/TRAINING PROGRAM

## 2023-08-03 RX ADMIN — LOSARTAN POTASSIUM 100 MG: 50 TABLET, FILM COATED ORAL at 09:16

## 2023-08-03 RX ADMIN — ATORVASTATIN CALCIUM 40 MG: 40 TABLET, FILM COATED ORAL at 20:31

## 2023-08-03 RX ADMIN — CARVEDILOL 6.25 MG: 6.25 TABLET, FILM COATED ORAL at 09:16

## 2023-08-03 RX ADMIN — TRAMADOL HYDROCHLORIDE 50 MG: 50 TABLET, COATED ORAL at 20:33

## 2023-08-03 RX ADMIN — CARVEDILOL 6.25 MG: 6.25 TABLET, FILM COATED ORAL at 17:27

## 2023-08-03 RX ADMIN — Medication 10 ML: at 20:31

## 2023-08-03 RX ADMIN — Medication 10 ML: at 09:17

## 2023-08-03 RX ADMIN — ONDANSETRON 4 MG: 4 TABLET, ORALLY DISINTEGRATING ORAL at 22:03

## 2023-08-03 RX ADMIN — QUETIAPINE FUMARATE 50 MG: 25 TABLET, FILM COATED ORAL at 13:13

## 2023-08-03 RX ADMIN — NIFEDIPINE 30 MG: 30 TABLET, EXTENDED RELEASE ORAL at 09:16

## 2023-08-03 RX ADMIN — ERTAPENEM SODIUM 1000 MG: 1 INJECTION INTRAMUSCULAR; INTRAVENOUS at 09:16

## 2023-08-03 RX ADMIN — ACETAMINOPHEN 650 MG: 325 TABLET ORAL at 13:13

## 2023-08-03 RX ADMIN — ENOXAPARIN SODIUM 40 MG: 40 INJECTION SUBCUTANEOUS at 20:31

## 2023-08-03 RX ADMIN — ASPIRIN 81 MG: 81 TABLET, CHEWABLE ORAL at 09:16

## 2023-08-03 NOTE — PROGRESS NOTES
[facility] HOSPITALIST PROGRESS NOTE     Patient Identification:  Name:  Edvin Jiménez  Age:  52 y.o.  Sex:  male  :  1970  MRN:  [id number]  Visit Number:  [id number]  ROOM: [address] [place] [address]     Primary Care Provider:  Rosa, Alexandrea Known     Date of Admission: 2023    Length of stay in inpatient status:  7    Subjective     Chief Compliant:    Chief Complaint   Patient presents with    Alcohol Problem       Patient with isolated outburst this am where he pulled code blue alarm and was verbally confrontational to nursing staff. House supervisor contacted and patient confronted, by the time I was rounding patient had returned to being calm and cooperative with abx treatment plan.        Objective     Current Hospital Meds:  aspirin, 81 mg, Oral, Daily  atorvastatin, 40 mg, Oral, Nightly  carvedilol, 6.25 mg, Oral, BID With Meals  enoxaparin, 40 mg, Subcutaneous, Nightly  ertapenem, 1,000 mg, Intravenous, Q24H  losartan, 100 mg, Oral, Q24H  NIFEdipine CC, 30 mg, Oral, Q24H  sodium chloride, 10 mL, Intravenous, Q12H  sodium chloride, 10 mL, Intravenous, Q12H  sodium chloride, 10 mL, Intravenous, Q12H       Current Antimicrobial Therapy:  Anti-Infectives (From admission, onward)      Ordered     Dose/Rate Route Frequency Start Stop    23 0444  ertapenem (INVanz) 1,000 mg in sodium chloride 0.9 % 100 mL IVPB-VTB        Ordering Provider: Liyah Manriquez APRN    1,000 mg  200 mL/hr over 30 Minutes Intravenous Every 24 Hours 23 0800 23 0759    23 1146  piperacillin-tazobactam (ZOSYN) IVPB 3.375 g in 100 mL NS VTB        Ordering Provider: Colby Lam DO    3.375 g  over 30 Minutes Intravenous Once 23 1202 23 1330          Current Diuretic Therapy:  Diuretics (From admission, onward)      None          ----------------------------------------------------------------------------------------------------------------------  Vital Signs:  Temp:  [98 øF (36.7 øC)]  98 øF (36.7 øC)  Heart Rate:  [87] 87  Resp:  [20] 20  BP: (122)/(87) 122/87  SpO2:  [97 %] 97 %  on   ;   Device (Oxygen Therapy): room air  Body mass index is 20.82 kg/mý.    Wt Readings from Last 3 Encounters:   08/01/23 64 kg (141 lb)     Intake & Output (last 3 days)         07/31 0701 08/01 0700 08/01 0701 08/02 0700 08/02 0701 08/03 0700 08/03 0701  08/04 0700    P.O. 4403 566 5045 480    Total Intake(mL/kg) 1070 (16.7) 720 (11.3) 1320 (20.6) 480 (7.5)    Net +1070 +720 +1320 +480            Urine Unmeasured Occurrence 3 x  7 x     Stool Unmeasured Occurrence 0 x             Diet: Regular/House Diet; Texture: Regular Texture (IDDSI 7); Fluid Consistency: Thin (IDDSI 0)  ----------------------------------------------------------------------------------------------------------------------  Physical Exam  Vitals and nursing note reviewed.   Constitutional:       General: He is not in acute distress.  HENT:      Head: Normocephalic and atraumatic.      Mouth/Throat:      Mouth: Mucous membranes are moist.      Pharynx: Oropharynx is clear.   Eyes:      General: No scleral icterus.     Extraocular Movements: Extraocular movements intact.   Cardiovascular:      Rate and Rhythm: Normal rate.      Pulses: Normal pulses.      Heart sounds: No murmur heard.  Pulmonary:      Effort: Pulmonary effort is normal. No respiratory distress.   Abdominal:      General: Abdomen is flat.      Palpations: Abdomen is soft.   Musculoskeletal:      Right lower leg: No edema.      Left lower leg: No edema.   Skin:     General: Skin is warm and dry.      Capillary Refill: Capillary refill takes less than 2 seconds.   Neurological:      General: No focal deficit present.      Mental Status: He is alert and oriented to person, place, and time.   Psychiatric:         Mood and Affect: Mood normal.         Behavior: Behavior normal.      ----------------------------------------------------------------------------------------------------------------------    ----------------------------------------------------------------------------------------------------------------------  LABS:    CBC and coagulation:  Results from last 7 days   Lab Units 08/02/23  0014 07/30/23  0405 07/29/23  0018 07/28/23  0004 07/27/23  1852 07/27/23  1530 07/27/23  1236   PROCALCITONIN ng/mL  --   --   --   --   --   --  0.39*   LACTATE mmol/L  --   --   --   --  1.6 3.0* 2.3*   WBC 10*3/mm3 4.66 3.89 5.93 7.12  --   --   --    HEMOGLOBIN g/dL 8.7* 8.2* 8.0* 9.5*  --   --   --    HEMATOCRIT % 27.8* 26.4* 25.3* 30.6*  --   --   --    MCV fL 81.8 81.7 80.6 81.2  --   --   --    MCHC g/dL 31.3* 31.1* 31.6 31.0*  --   --   --    PLATELETS 10*3/mm3 180 100* 95* 97*  --   --   --    INR   --   --   --   --   --   --  1.02       Acid/base balance:      Renal and electrolytes:  Results from last 7 days   Lab Units 08/02/23  0014 07/30/23  0405 07/29/23  0018 07/28/23  0004 07/27/23  1852   SODIUM mmol/L 136 138 135* 133*  --    POTASSIUM mmol/L 4.2 4.2 4.2 3.7  --    MAGNESIUM mg/dL  --   --   --  2.5 1.4*   CHLORIDE mmol/L 99 103 101 96*  --    CO2 mmol/L 27.0 27.3 25.0 23.9  --    BUN mg/dL 11 10 11 10  --    CREATININE mg/dL 1.02 0.87 1.02 1.01  --    CALCIUM mg/dL 8.7 8.4* 8.1* 7.8*  --    PHOSPHORUS mg/dL  --   --   --  4.0 4.4   GLUCOSE mg/dL 117* 111* 133* 173*  --        Estimated Creatinine Clearance: 76.7 mL/min (by C-G formula based on SCr of 1.02 mg/dL).    Liver and pancreatic function:  Results from last 7 days   Lab Units 08/02/23  0014   ALBUMIN g/dL 2.9*   BILIRUBIN mg/dL 0.3   ALK PHOS U/L 154*   AST (SGOT) U/L 57*   ALT (SGPT) U/L 33   LIPASE U/L 18       Endocrine function:  Lab Results   Component Value Date    HGBA1C 5.60 07/27/2023     Point of care bedside glucose levels:  Results from last 7 days   Lab Units 08/02/23  0731   GLUCOSE mg/dL 112        Glucose levels from the CMP:  Results from last 7 days   Lab Units 08/02/23  0014 07/30/23  0405 07/29/23  0018 07/28/23  0004   GLUCOSE mg/dL 117* 111* 133* 173*       Lab Results   Component Value Date    TSH 1.120 07/27/2023     Cardiac:  Results from last 7 days   Lab Units 07/27/23  1236   HSTROP T ng/L 22*       Results from last 7 days   Lab Units 07/27/23  1236   CHOLESTEROL mg/dL 138   TRIGLYCERIDES mg/dL 53   HDL CHOL mg/dL 110*   LDL CHOL mg/dL 16       Cultures:  Lab Results   Component Value Date    COLORU Red (A) 07/27/2023    CLARITYU Turbid (A) 07/27/2023    PHUR 7.0 07/27/2023    PROTEINUR 77.5 07/27/2023    GLUCOSEU Negative 07/27/2023    KETONESU Negative 07/27/2023    BLOODU Large (3+) (A) 07/27/2023    NITRITEU Negative 07/27/2023    LEUKOCYTESUR Large (3+) (A) 07/27/2023    BILIRUBINUR Small (1+) (A) 07/27/2023    UROBILINOGEN 1.0 E.U./dL 07/27/2023    RBCUA Too Numerous to Count (A) 07/27/2023    WBCUA Too Numerous to Count (A) 07/27/2023    BACTERIA None Seen 07/27/2023     Microbiology Results (last 10 days)       Procedure Component Value - Date/Time    Blood Culture - Blood, Wrist, Right [[id number]]  (Normal) Collected: 07/28/23 1200    Lab Status: Final result Specimen: Blood from Wrist, Right Updated: 08/02/23 1300     Blood Culture No growth at 5 days    Blood Culture - Blood, Arm, Left [[id number]]  (Normal) Collected: 07/28/23 1148    Lab Status: Final result Specimen: Blood from Arm, Left Updated: 08/02/23 1300     Blood Culture No growth at 5 days    Chlamydia trachomatis, Neisseria gonorrhoeae, PCR - Urine, Urine, Random Void [[id number]]  (Normal) Collected: 07/27/23 1855    Lab Status: Final result Specimen: Urine, Random Void Updated: 07/27/23 2031     Chlamydia DNA by PCR Not Detected     Neisseria gonorrhoeae by PCR Not Detected    Narrative:      PCR testing performed using target DNA sequences.    Blood Culture - Blood, Hand, Left [[id number]]  (Abnormal)   (Susceptibility) Collected: 07/27/23 1239    Lab Status: Final result Specimen: Blood from Hand, Left Updated: 07/30/23 1006     Blood Culture Escherichia coli ESBL     Comment:   Consider infectious disease consult.  Susceptibility results may not correlate to clinical outcomes.  For ESBL-producing infections in the blood, a carbapenem is recommended as first-line therapy for optimal clinical outcomes.        Isolated from Aerobic and Anaerobic Bottles     Gram Stain Anaerobic Bottle Gram negative bacilli      Aerobic Bottle Gram negative bacilli    Susceptibility        Escherichia coli ESBL      SANGEETHA      Ertapenem Susceptible      Meropenem Susceptible                       Susceptibility Comments       Escherichia coli ESBL    Cefazolin sensitivity will not be reported for Enterobacteriaceae in non-urine isolates. If cefazolin is preferred, please call the microbiology lab to request an E-test.  With the exception of urinary-sourced infections, aminoglycosides should not be used as monotherapy.               Blood Culture ID, PCR - Blood, Hand, Left [[id number]]  (Abnormal) Collected: 07/27/23 1239    Lab Status: Final result Specimen: Blood from Hand, Left Updated: 07/28/23 0423     BCID, PCR Escherichia coli. Identification by BCID2 PCR.     BCID, PCR 2 CTX-M (ESBL) detected. Identification by BCID2 PCR     BOTTLE TYPE Aerobic Bottle    Blood Culture - Blood, Arm, Left [[id number]]  (Abnormal) Collected: 07/27/23 1236    Lab Status: Final result Specimen: Blood from Arm, Left Updated: 07/30/23 1006     Blood Culture Escherichia coli ESBL     Comment:   Consider infectious disease consult.  Susceptibility results may not correlate to clinical outcomes.  For ESBL-producing infections in the blood, a carbapenem is recommended as first-line therapy for optimal clinical outcomes.        Isolated from Aerobic and Anaerobic Bottles     Gram Stain Aerobic Bottle Gram negative bacilli      Anaerobic Bottle Gram negative  bacilli    Narrative:      Refer to previous blood culture collected on 07/27/2023 1239 for MICs.    COVID PRE-OP / PRE-PROCEDURE SCREENING ORDER (NO ISOLATION) - Swab, Nasopharynx [[id number]]  (Normal) Collected: 07/27/23 1129    Lab Status: Final result Specimen: Swab from Nasopharynx Updated: 07/27/23 1250    Narrative:      The following orders were created for panel order COVID PRE-OP / PRE-PROCEDURE SCREENING ORDER (NO ISOLATION) - Swab, Nasopharynx.  Procedure                               Abnormality         Status                     ---------                               -----------         ------                     COVID-19,CEPHEID/ROSE,CO...[[id number]]  Normal              Final result                 Please view results for these tests on the individual orders.    COVID-19,CEPHEID/ROSE,COR/AVELINA/PAD/BEKCY/MAD IN-HOUSE(OR EMERGENT/ADD-ON),NP SWAB IN TRANSPORT MEDIA 3-4 HR TAT, RT-PCR - Swab, Nasopharynx [[id number]]  (Normal) Collected: 07/27/23 1129    Lab Status: Final result Specimen: Swab from Nasopharynx Updated: 07/27/23 1250     COVID19 Not Detected    Narrative:      Fact sheet for providers: https://www.fda.gov/media/067810/download     Fact sheet for patients: https://www.fda.gov/media/864494/download  Fact sheet for providers: https://www.fda.gov/media/803929/download    Fact sheet for patients: https://www.fda.gov/media/430204/download    Test performed by PCR.    Urine Culture - Urine, Urine, Clean Catch [[id number]]  (Abnormal)  (Susceptibility) Collected: 07/27/23 1124    Lab Status: Final result Specimen: Urine, Clean Catch Updated: 07/29/23 0457     Urine Culture >100,000 CFU/mL Escherichia coli ESBL     Comment:   Consider infectious disease consult.  Susceptibility results may not correlate to clinical outcomes.       Narrative:      Colonization of the urinary tract without infection is common. Treatment is discouraged unless the patient is symptomatic, pregnant, or undergoing an invasive  urologic procedure.  Recent outcomes data supports the use of pip/tazo in the treatment of susceptible ESBL infections for uncomplicated UTI. Consider use of pip/tazo as a carbapenem-sparing regimen in applicable patients.    Susceptibility        Escherichia coli ESBL      SANGEETHA      Amikacin Susceptible      Ertapenem Susceptible      Gentamicin Resistant      Levofloxacin Resistant      Meropenem Susceptible      Nitrofurantoin Intermediate      Piperacillin + Tazobactam Susceptible      Tobramycin Resistant      Trimethoprim + Sulfamethoxazole Resistant                                   No results found for: PREGTESTUR, PREGSERUM, HCG, HCGQUANT  Pain Management Panel          Latest Ref Rng & Units 7/27/2023   Pain Management Panel   Creatinine, Urine mg/dL 47.4    Amphetamine, Urine Qual Negative Negative    Barbiturates Screen, Urine Negative Negative    Benzodiazepine Screen, Urine Negative Negative    Buprenorphine, Screen, Urine Negative Negative    Cocaine Screen, Urine Negative Negative    Fentanyl, Urine Negative Negative    Methadone Screen , Urine Negative Negative    Methamphetamine, Ur Negative Negative    I have personally looked at the labs and they are summarized above.  ----------------------------------------------------------------------------------------------------------------------  Detailed radiology reports for the last 24 hours:    Imaging Results (Last 24 Hours)       ** No results found for the last 24 hours. **            Assessment & Plan      #Sepsis due to ESBL E. coli bacteremia  #Urinary tract infection ESBL E. coli, POA  -CT a/p w/cystitis noted. UA with hematuria, pyuria. Urine cx ESBL e/coli sensitive to ertapenem  -7/27 BCX ESBL, 7/28 bcx NGTD  -ID following, midline placed 7/31  -Cont ertapenem until 8/11, unfortunately no PO options available  -Inpatient [facility] consult placed to cont IV abx, not accepting self pay patients. Will likely have to remain inpatient for duration of  treatment.     #Acute alcohol withdrawal, exaggerated for secondary gain  #Drug-seeking behavior  #Elevated transaminases  #Chronic pancreatitis  #Alcoholic cirrhosis, early  -Librium taper completed, cont PO vitamin supplementation after completion of IV load  -Plans to try to discharge back to rehab in [place], otherwise no stable housing currently  -Did c/o abd pain 8/1. Repeat lipase wnl and pain resolved without intervention or prn opioids    #Microcytic anemia, suspect chronic  #Thrombocytopenia  -Stable    #Hypertensive urgency, POA   -Improved with carvedilol, increased ARB dose for proteinuria and htn    #Myocardial injury secondary to sepsis  -CP Resolved, Echo w/EF 51-55%    #Pulmonary nodule, needs outpatient follow-up in 3 months  #Proteinuria, protein creatinine ratio 1.5 g, continue new losartan dose  #Ascending aortic ectasia 3.9 cm noted on CT angiogram, outpatient follow-up  #HCV antibody positive, RNA quant sent and pending  #Chronic pancreatitis due to EtOH use, supportive care  #Hypovolemic hyponatremia, not clinically significant  #Hypomagnesemia, replace per protocol    VTE Prophylaxis:   Mechanical Order History:       None          Pharmalogical Order History:        Ordered     Dose Route Frequency Stop    07/27/23 1811  Enoxaparin Sodium (LOVENOX) syringe 40 mg         40 mg SC Nightly --                    Code Status and Medical Interventions:   Ordered at: 07/27/23 1358     Code Status (Patient has no pulse and is not breathing):    CPR (Attempt to Resuscitate)     Medical Interventions (Patient has pulse or is breathing):    Full Support     Release to patient:    Routine Release         Disposition: Cont inpatient IV abx, given difficult dispo patient may unfortunately discharge to street following completion     I have reviewed any copied/forwarded text or data, verified its accuracy, and updated as necessary above.    Tavo Davis MD  [facility]  Hospitalist  08/03/23  12:00 EDT

## 2023-08-03 NOTE — CASE MANAGEMENT/SOCIAL WORK
Discharge Planning Assessment  LIGIA Mcconnell     Patient Name: Edvin Jiménez  MRN: [id number]  Today's Date: 8/3/2023    Admit Date: 7/27/2023     Discharge Plan       Row Name 08/03/23 1320       Plan    Plan SS [facility] [facility] [phone] per Elsa who states there is no active application that is pending at this time and the process of a new application could take up to 45 days to be completed. SS to follow and assist with discharge planning.             EVELYN JaureguiW

## 2023-08-03 NOTE — PROGRESS NOTES
"Enter Query Response Below      Query Response: NSTEMI type 2 ruled out              If applicable, please update the problem list.       Patient: Edvin Jiménez        : 1970  Account: [id number]           Admit Date:         How to Respond to this query:       a. Click New Note     b. Answer query within the yellow box.                c. Update the Problem List, if applicable.      If you have any questions about this query contact me at: Sincerely,    Edouard Cross RN, BSN  Clinical Documentation Integrity  [facility]  [email or web address]       ,     52 year old male with history of alcohol abuse presented with fever and was found to have sepsis due to ESBL E. coli bacteremia, UTI and \"acute alcohol withdrawal, exaggerated for secondary gain\" after study.      -Progress note  mentions \"NSTEMI type 2, atypical chest pain,\" with troponin level 24, repeated 22 with a -2 delta, EKG noted NSR without acute ST or T wave changes.\"      -Progress note  states \"Myocardial injury secondary to sepsis-CP Resolved, Echo w/EF 51-55%.\"      -Treatments: Placed on Losartan, Coreg, Lipitor.    After study, please clarify the following:    NSTEMI type 2 ruled in and due to _________(please specify etiology)  NSTEMI type 2 ruled out  Other- specify______  Unable to determine      By submitting this query, we are merely seeking further clarification of documentation to accurately reflect all conditions that you are monitoring, evaluating, treating or that extend the hospitalization or utilize additional resources of care. Please utilize your independent clinical judgment when addressing the question(s) above.     This query and your response, once completed, will be entered into the legal medical record.    Sincerely,  Edouard Cross  Clinical Documentation Integrity Program     "

## 2023-08-03 NOTE — PROGRESS NOTES
PROGRESS NOTE         Patient Identification:  Name:  Edvin Jiménez  Age:  52 y.o.  Sex:  male  :  1970  MRN:  [id number]  Visit Number:  [id number]  Primary Care Provider:  Provider, No Known         LOS: 7 days       ----------------------------------------------------------------------------------------------------------------------  Subjective       Chief Complaints:    Alcohol Problem        Interval History:      The patient is awake and alert, sitting up comfortably on the edge of the bed.  On room air with no apparent distress.  Denies any abdominal pain or urinary symptoms.  Lung exam clear to auscultation bilaterally.  Abdomen soft and nontender.  Afebrile.  Denies diarrhea.      Review of Systems:    Constitutional: no fever, chills and night sweats.    Eyes: no eye drainage, itching or redness.  HEENT: no mouth sores, dysphagia or nose bleed.  Respiratory: no for shortness of breath, cough or production of sputum.  Cardiovascular: no chest pain, no palpitations, no orthopnea.  Gastrointestinal: no nausea, vomiting or diarrhea. No abdominal pain, hematemesis or rectal bleeding.  Genitourinary: no dysuria or polyuria.  Hematologic/lymphatic: no lymph node abnormalities, no easy bruising or easy bleeding.  Musculoskeletal: no muscle or joint pain.  Skin: No rash and no itching.  Neurological: no loss of consciousness, no seizure, no headache.  Behavioral/Psych: no depression or suicidal ideation.  Endocrine: no hot flashes.  Immunologic: negative.    ----------------------------------------------------------------------------------------------------------------------      Objective       Current [facility] Meds:  aspirin, 81 mg, Oral, Daily  atorvastatin, 40 mg, Oral, Nightly  carvedilol, 6.25 mg, Oral, BID With Meals  enoxaparin, 40 mg, Subcutaneous, Nightly  ertapenem, 1,000 mg, Intravenous, Q24H  losartan, 100 mg, Oral, Q24H  NIFEdipine CC, 30 mg, Oral, Q24H  sodium chloride, 10  mL, Intravenous, Q12H  sodium chloride, 10 mL, Intravenous, Q12H  sodium chloride, 10 mL, Intravenous, Q12H         ----------------------------------------------------------------------------------------------------------------------    Vital Signs:  Temp:  [98 øF (36.7 øC)] 98 øF (36.7 øC)  Heart Rate:  [87] 87  Resp:  [20] 20  BP: (122)/(87) 122/87  No data found.    SpO2 Percentage    08/02/23 0735 08/02/23 0841 08/02/23 1900   SpO2: 96% 98% 97%     SpO2:  [97 %] 97 %  on   ;   Device (Oxygen Therapy): room air    Body mass index is 20.82 kg/mý.  Wt Readings from Last 3 Encounters:   08/01/23 64 kg (141 lb)        Intake/Output Summary (Last 24 hours) at 8/3/2023 1255  Last data filed at 8/3/2023 1100  Gross per 24 hour   Intake 1320 ml   Output --   Net 1320 ml       Diet: Regular/House Diet; Texture: Regular Texture (IDDSI 7); Fluid Consistency: Thin (IDDSI 0)  ----------------------------------------------------------------------------------------------------------------------      Physical Exam:    Constitutional:  Well-developed and well-nourished.  -American male.  Sitting up at the edge of the bed, eating breakfast.  Denies any complaints.  HENT:  Head: Normocephalic and atraumatic.  Mouth:  Moist mucous membranes.    Eyes:  Conjunctivae and EOM are normal.  No scleral icterus.  Neck:  Neck supple.  No JVD present.    Cardiovascular:  Normal rate, regular rhythm and normal heart sounds with no murmur. No edema.  Pulmonary/Chest:  No respiratory distress, no wheezes, no crackles, with normal breath sounds and good air movement.  Lungs clear to auscultation bilaterally.  Abdominal:  Soft.  Bowel sounds are normal.  No distension and no tenderness.   Musculoskeletal:  No edema, no tenderness, and no deformity.  No swelling or redness of joints.  Neurological:  Alert and oriented to person, place, and time.  No facial droop.  No slurred speech.   Skin:  Skin is warm and dry.  No rash noted.  No pallor.    Psychiatric:  Normal mood and affect.  Behavior is normal.        ----------------------------------------------------------------------------------------------------------------------                  Results from last 7 days   Lab Units 08/02/23 0014 07/30/23 0405 07/29/23 0018 07/28/23 0004 07/27/23  1852 07/27/23  1530   LACTATE mmol/L  --   --   --   --  1.6 3.0*   WBC 10*3/mm3 4.66 3.89 5.93   < >  --   --    HEMOGLOBIN g/dL 8.7* 8.2* 8.0*   < >  --   --    HEMATOCRIT % 27.8* 26.4* 25.3*   < >  --   --    MCV fL 81.8 81.7 80.6   < >  --   --    MCHC g/dL 31.3* 31.1* 31.6   < >  --   --    PLATELETS 10*3/mm3 180 100* 95*   < >  --   --     < > = values in this interval not displayed.       Results from last 7 days   Lab Units 08/02/23 0014 07/30/23 0405 07/29/23 0018 07/28/23 0004 07/28/23 0004 07/27/23  1852   SODIUM mmol/L 136 138 135*   < > 133*  --    POTASSIUM mmol/L 4.2 4.2 4.2   < > 3.7  --    MAGNESIUM mg/dL  --   --   --   --  2.5 1.4*   CHLORIDE mmol/L 99 103 101   < > 96*  --    CO2 mmol/L 27.0 27.3 25.0   < > 23.9  --    BUN mg/dL 11 10 11   < > 10  --    CREATININE mg/dL 1.02 0.87 1.02   < > 1.01  --    CALCIUM mg/dL 8.7 8.4* 8.1*   < > 7.8*  --    GLUCOSE mg/dL 117* 111* 133*   < > 173*  --    ALBUMIN g/dL 2.9*  --   --   --   --   --    BILIRUBIN mg/dL 0.3  --   --   --   --   --    ALK PHOS U/L 154*  --   --   --   --   --    AST (SGOT) U/L 57*  --   --   --   --   --    ALT (SGPT) U/L 33  --   --   --   --   --     < > = values in this interval not displayed.     Estimated Creatinine Clearance: 76.7 mL/min (by C-G formula based on SCr of 1.02 mg/dL).  No results found for: AMMONIA    Glucose   Date/Time Value Ref Range Status   08/02/2023 0731 112 70 - 130 mg/dL Final     Comment:     Meter: [id number] : [id number] DENNIS ASENCIO       Lab Results   Component Value Date    HGBA1C 5.60 07/27/2023     Lab Results   Component Value Date    TSH 1.120 07/27/2023       Blood Culture    Date Value Ref Range Status   07/27/2023 Gram Negative Bacilli (C)  Preliminary   07/27/2023 Gram Negative Bacilli (C)  Preliminary     Urine Culture   Date Value Ref Range Status   07/27/2023 >100,000 CFU/mL Escherichia coli ESBL (A)  Final     Comment:       Consider infectious disease consult.  Susceptibility results may not correlate to clinical outcomes.     No results found for: WOUNDCX  No results found for: STOOLCX  No results found for: RESPCX  Pain Management Panel          Latest Ref Rng & Units 7/27/2023   Pain Management Panel   Creatinine, Urine mg/dL 47.4    Amphetamine, Urine Qual Negative Negative    Barbiturates Screen, Urine Negative Negative    Benzodiazepine Screen, Urine Negative Negative    Buprenorphine, Screen, Urine Negative Negative    Cocaine Screen, Urine Negative Negative    Fentanyl, Urine Negative Negative    Methadone Screen , Urine Negative Negative    Methamphetamine, Ur Negative Negative    ----------------------------------------------------------------------------------------------------------------------  Imaging Results (Last 24 Hours)       ** No results found for the last 24 hours. **            ----------------------------------------------------------------------------------------------------------------------    Pertinent Infectious Disease Results      Presented to [facility] Emergency Department on 7/27/2023 for chest pain and vomiting. Chlamydia and gonorrhea negative.  COVID-19 PCR negative.  Procalcitonin slightly elevated at 0.39.  Lactic acid elevated at 2.3 on admission.  Blood cultures from 7/27/2023 2 out of 2 sets positive for ESBL E. coli.  HIV negative.  Hepatitis C reactive.           Assessment/Plan       Assessment     Severe sepsis with lactic acid greater than 2 on admission  ESBL bacteremia  Possible cystitis        Plan      The patient is awake and alert, sitting up comfortably on the edge of the bed.  On room air with no apparent  distress.  Denies any abdominal pain or urinary symptoms.  Lung exam clear to auscultation bilaterally.  Abdomen soft and nontender.  Afebrile.  Denies diarrhea.    Based on finalization of culture results, continue with ertapenem 1 g IV every 24 hours for treatment of ESBL bacteremia with urinary source through 8/11/2023.  We will continue to monitor closely and adjust antibiotic coverage as appropriate.    ANTIMICROBIAL THERAPY    ertapenem (INVanz) 1000 mg in 100ml NS VTB     Code Status:   Code Status and Medical Interventions:   Ordered at: 07/27/23 1358     Code Status (Patient has no pulse and is not breathing):    CPR (Attempt to Resuscitate)     Medical Interventions (Patient has pulse or is breathing):    Full Support     Release to patient:    Routine Release       YISSEL Angel  08/03/23  12:55 EDT    Electronically signed by YISSEL Angel, 08/03/23, 12:58 PM EDT.

## 2023-08-03 NOTE — PLAN OF CARE
Goal Outcome Evaluation:           Progress: no change  Outcome Evaluation: Pt resting in bed at this time. Complaints of pain, PRN pain medications given per MAR. Will continue with plan of care.

## 2023-08-03 NOTE — PLAN OF CARE
Goal Outcome Evaluation:           Progress: no change  Outcome Evaluation: Pt resting in bed throughout shift. Had mood swings this morning that were brought to the attention of the House Supervisor who came and talked to the patient. The patient has been calm through the shift since. Pt has had complaints of pain and was given PRN pain medications. No acute changes at this time. Will continue with plan of care.

## 2023-08-04 PROCEDURE — 99232 SBSQ HOSP IP/OBS MODERATE 35: CPT | Performed by: NURSE PRACTITIONER

## 2023-08-04 PROCEDURE — 99231 SBSQ HOSP IP/OBS SF/LOW 25: CPT | Performed by: STUDENT IN AN ORGANIZED HEALTH CARE EDUCATION/TRAINING PROGRAM

## 2023-08-04 PROCEDURE — 25010000002 ERTAPENEM PER 500 MG: Performed by: NURSE PRACTITIONER

## 2023-08-04 PROCEDURE — 25010000002 ENOXAPARIN PER 10 MG: Performed by: STUDENT IN AN ORGANIZED HEALTH CARE EDUCATION/TRAINING PROGRAM

## 2023-08-04 PROCEDURE — 63710000001 ONDANSETRON ODT 4 MG TABLET DISPERSIBLE: Performed by: STUDENT IN AN ORGANIZED HEALTH CARE EDUCATION/TRAINING PROGRAM

## 2023-08-04 RX ADMIN — NIFEDIPINE 30 MG: 30 TABLET, EXTENDED RELEASE ORAL at 08:08

## 2023-08-04 RX ADMIN — ONDANSETRON 4 MG: 4 TABLET, ORALLY DISINTEGRATING ORAL at 18:33

## 2023-08-04 RX ADMIN — TRAMADOL HYDROCHLORIDE 50 MG: 50 TABLET, COATED ORAL at 16:45

## 2023-08-04 RX ADMIN — QUETIAPINE FUMARATE 50 MG: 25 TABLET, FILM COATED ORAL at 19:58

## 2023-08-04 RX ADMIN — Medication 10 ML: at 20:09

## 2023-08-04 RX ADMIN — Medication 10 ML: at 08:09

## 2023-08-04 RX ADMIN — ERTAPENEM SODIUM 1000 MG: 1 INJECTION INTRAMUSCULAR; INTRAVENOUS at 08:08

## 2023-08-04 RX ADMIN — LOSARTAN POTASSIUM 100 MG: 50 TABLET, FILM COATED ORAL at 08:08

## 2023-08-04 RX ADMIN — CARVEDILOL 6.25 MG: 6.25 TABLET, FILM COATED ORAL at 17:48

## 2023-08-04 RX ADMIN — ASPIRIN 81 MG: 81 TABLET, CHEWABLE ORAL at 08:08

## 2023-08-04 RX ADMIN — ATORVASTATIN CALCIUM 40 MG: 40 TABLET, FILM COATED ORAL at 20:09

## 2023-08-04 RX ADMIN — ENOXAPARIN SODIUM 40 MG: 40 INJECTION SUBCUTANEOUS at 20:09

## 2023-08-04 RX ADMIN — CARVEDILOL 6.25 MG: 6.25 TABLET, FILM COATED ORAL at 08:08

## 2023-08-04 NOTE — PLAN OF CARE
"Goal Outcome Evaluation:  Plan of Care Reviewed With: patient           Outcome Evaluation: Patient is resting in bed, c/o pain. See MAR. patient agitated at lunch time this shift. Patient frequently seeking out RN. RN enters room to answer call light, patient states \"I dont need anything.\" While holding button on remote. RN educated patient on call light purpose, pt continues to frequently seek out RN with no specific requests. No acute changes, will continue POC         "

## 2023-08-04 NOTE — PROGRESS NOTES
Adult Nutrition  Assessment/PES    Patient Name:  Edvin Jiménez  YOB: 1970  MRN: [id number]  Admit Date:  7/27/2023    Assessment Date:  8/4/2023    Comments:  No new recommendations, continue with current diet and encourage intakes.     Reason for Assessment       Row Name 08/04/23 1431          Reason for Assessment    Reason For Assessment follow-up protocol     Diagnosis other (see comments)  alchol withdrawal     Identified At Risk by Screening Criteria MST SCORE 2+                      Labs/Tests/Procedures/Meds       Row Name 08/04/23 1435          Labs/Procedures/Meds    Lab Results Reviewed reviewed        Medications    Pertinent Medications Reviewed reviewed                        Nutrition Prescription Ordered       Row Name 08/04/23 1435          Nutrition Prescription PO    Current PO Diet Regular     Fluid Consistency Thin                    Evaluation of Received Nutrient/Fluid Intake       Row Name 08/04/23 1436          Intake Assessment    Fluid Requirement Assessment meeting needs        Fluid Intake Evaluation    Oral Fluid (mL) 1200     Other Fluid (mL) 102  IV        PO Evaluation    Number of Meals 7     % PO Intake ~100                       Problem/Interventions:   Problem 1       Row Name 08/04/23 1437          Nutrition Diagnoses Problem 1    Problem 1 Nutrition Appropriate for Condition at this Time     Etiology (related to) Medical Diagnosis     Substance Use ETOH     Signs/Symptoms (evidenced by) Report/Observation     Reported/Observed By RN;MD                          Intervention Goal       Row Name 08/04/23 1437          Intervention Goal    PO Maintain intake                    Nutrition Intervention       Row Name 08/04/23 1437          Nutrition Intervention    RD/Tech Action Follow Tx progress                      Education/Evaluation       Row Name 08/04/23 1438          Education    Education No discharge needs identified at this time         Monitor/Evaluation    Monitor Per protocol;PO intake;Pertinent labs;Weight                     Electronically signed by:  Rosalba Munoz RD  08/04/23 14:38 EDT

## 2023-08-04 NOTE — PLAN OF CARE
Goal Outcome Evaluation:  Plan of Care Reviewed With: patient        Progress: no change  Outcome Evaluation: Patient has been resting in bed this shift. Pt complains of nausea and vomiting, See MAR. no acute changes at this time. VSS. Plan of care ongoing.

## 2023-08-04 NOTE — PROGRESS NOTES
[facility] HOSPITALIST PROGRESS NOTE     Patient Identification:  Name:  Edvin Jiménez  Age:  52 y.o.  Sex:  male  :  1970  MRN:  [id number]  Visit Number:  [id number]  ROOM: [address] [place] [address]     Primary Care Provider:  Alexandrea Lee Known     Date of Admission: 2023    Length of stay in inpatient status:  8    Subjective     Chief Compliant:    Chief Complaint   Patient presents with    Alcohol Problem       Patient was throwing food outside room yesterday afternoon prompting security to be called and patient re-educated regarding hospital behavior policies. No outbursts this morning and has been more pleasant and cooperative.        Objective     Current Hospital Meds:  aspirin, 81 mg, Oral, Daily  atorvastatin, 40 mg, Oral, Nightly  carvedilol, 6.25 mg, Oral, BID With Meals  enoxaparin, 40 mg, Subcutaneous, Nightly  ertapenem, 1,000 mg, Intravenous, Q24H  losartan, 100 mg, Oral, Q24H  NIFEdipine CC, 30 mg, Oral, Q24H  sodium chloride, 10 mL, Intravenous, Q12H  sodium chloride, 10 mL, Intravenous, Q12H  sodium chloride, 10 mL, Intravenous, Q12H       Current Antimicrobial Therapy:  Anti-Infectives (From admission, onward)      Ordered     Dose/Rate Route Frequency Start Stop    23 0444  ertapenem (INVanz) 1,000 mg in sodium chloride 0.9 % 100 mL IVPB-VTB        Ordering Provider: Liyah Manriquez APRN    1,000 mg  200 mL/hr over 30 Minutes Intravenous Every 24 Hours 23 0800 23 0759    23 1146  piperacillin-tazobactam (ZOSYN) IVPB 3.375 g in 100 mL NS VTB        Ordering Provider: Colby Lam DO    3.375 g  over 30 Minutes Intravenous Once 23 1202 23 1330          Current Diuretic Therapy:  Diuretics (From admission, onward)      None          ----------------------------------------------------------------------------------------------------------------------  Vital Signs:  Temp:  [98.2 øF (36.8 øC)] 98.2 øF (36.8 øC)  Heart Rate:  [80] 80  Resp:   [18] 18  BP: (126)/(90) 126/90  SpO2:  [97 %] 97 %  on   ;   Device (Oxygen Therapy): room air  Body mass index is 20.82 kg/mý.    Wt Readings from Last 3 Encounters:   08/01/23 64 kg (141 lb)     Intake & Output (last 3 days)         08/01 0701  08/02 0700 08/02 0701  08/03 0700 08/03 0701  08/04 0700 08/04 0701 08/05 0700    P.O. 720 1320 1200 600    I.V. (mL/kg)   102 (1.6)     Total Intake(mL/kg) 720 (11.3) 1320 (20.6) 1302 (20.3) 600 (9.4)    Net +720 +1320 +1302 +600            Urine Unmeasured Occurrence  7 x 3 x 1 x    Stool Unmeasured Occurrence   0 x           Diet: Regular/House Diet; Texture: Regular Texture (IDDSI 7); Fluid Consistency: Thin (IDDSI 0)  ----------------------------------------------------------------------------------------------------------------------  Physical Exam  Vitals and nursing note reviewed.   Constitutional:       General: He is not in acute distress.  HENT:      Head: Normocephalic and atraumatic.      Mouth/Throat:      Mouth: Mucous membranes are moist.      Pharynx: Oropharynx is clear.   Eyes:      General: No scleral icterus.     Extraocular Movements: Extraocular movements intact.   Cardiovascular:      Rate and Rhythm: Normal rate.      Pulses: Normal pulses.      Heart sounds: No murmur heard.  Pulmonary:      Effort: Pulmonary effort is normal. No respiratory distress.   Abdominal:      General: Abdomen is flat.      Palpations: Abdomen is soft.   Musculoskeletal:      Right lower leg: No edema.      Left lower leg: No edema.   Skin:     General: Skin is warm and dry.      Capillary Refill: Capillary refill takes less than 2 seconds.   Neurological:      General: No focal deficit present.      Mental Status: He is alert and oriented to person, place, and time.   Psychiatric:         Mood and Affect: Mood normal.         Behavior: Behavior normal.      ----------------------------------------------------------------------------------------------------------------------    ----------------------------------------------------------------------------------------------------------------------  LABS:    CBC and coagulation:  Results from last 7 days   Lab Units 08/02/23 0014 07/30/23 0405 07/29/23 0018   WBC 10*3/mm3 4.66 3.89 5.93   HEMOGLOBIN g/dL 8.7* 8.2* 8.0*   HEMATOCRIT % 27.8* 26.4* 25.3*   MCV fL 81.8 81.7 80.6   MCHC g/dL 31.3* 31.1* 31.6   PLATELETS 10*3/mm3 180 100* 95*       Acid/base balance:      Renal and electrolytes:  Results from last 7 days   Lab Units 08/02/23 0014 07/30/23 0405 07/29/23 0018   SODIUM mmol/L 136 138 135*   POTASSIUM mmol/L 4.2 4.2 4.2   CHLORIDE mmol/L 99 103 101   CO2 mmol/L 27.0 27.3 25.0   BUN mg/dL 11 10 11   CREATININE mg/dL 1.02 0.87 1.02   CALCIUM mg/dL 8.7 8.4* 8.1*   GLUCOSE mg/dL 117* 111* 133*       Estimated Creatinine Clearance: 76.7 mL/min (by C-G formula based on SCr of 1.02 mg/dL).    Liver and pancreatic function:  Results from last 7 days   Lab Units 08/02/23  0014   ALBUMIN g/dL 2.9*   BILIRUBIN mg/dL 0.3   ALK PHOS U/L 154*   AST (SGOT) U/L 57*   ALT (SGPT) U/L 33   LIPASE U/L 18       Endocrine function:  Lab Results   Component Value Date    HGBA1C 5.60 07/27/2023     Point of care bedside glucose levels:  Results from last 7 days   Lab Units 08/02/23  0731   GLUCOSE mg/dL 112       Glucose levels from the CMP:  Results from last 7 days   Lab Units 08/02/23  0014 07/30/23  0405 07/29/23  0018   GLUCOSE mg/dL 117* 111* 133*       Lab Results   Component Value Date    TSH 1.120 07/27/2023     Cardiac:              Cultures:  Lab Results   Component Value Date    COLORU Red (A) 07/27/2023    CLARITYU Turbid (A) 07/27/2023    PHUR 7.0 07/27/2023    PROTEINUR 77.5 07/27/2023    GLUCOSEU Negative 07/27/2023    KETONESU Negative 07/27/2023    BLOODU Large (3+) (A) 07/27/2023    NITRITEU Negative 07/27/2023     LEUKOCYTESUR Large (3+) (A) 07/27/2023    BILIRUBINUR Small (1+) (A) 07/27/2023    UROBILINOGEN 1.0 E.U./dL 07/27/2023    RBCUA Too Numerous to Count (A) 07/27/2023    WBCUA Too Numerous to Count (A) 07/27/2023    BACTERIA None Seen 07/27/2023     Microbiology Results (last 10 days)       Procedure Component Value - Date/Time    Blood Culture - Blood, Wrist, Right [[id number]]  (Normal) Collected: 07/28/23 1200    Lab Status: Final result Specimen: Blood from Wrist, Right Updated: 08/02/23 1300     Blood Culture No growth at 5 days    Blood Culture - Blood, Arm, Left [[id number]]  (Normal) Collected: 07/28/23 1148    Lab Status: Final result Specimen: Blood from Arm, Left Updated: 08/02/23 1300     Blood Culture No growth at 5 days    Chlamydia trachomatis, Neisseria gonorrhoeae, PCR - Urine, Urine, Random Void [[id number]]  (Normal) Collected: 07/27/23 1855    Lab Status: Final result Specimen: Urine, Random Void Updated: 07/27/23 2031     Chlamydia DNA by PCR Not Detected     Neisseria gonorrhoeae by PCR Not Detected    Narrative:      PCR testing performed using target DNA sequences.    Blood Culture - Blood, Hand, Left [[id number]]  (Abnormal)  (Susceptibility) Collected: 07/27/23 1239    Lab Status: Final result Specimen: Blood from Hand, Left Updated: 07/30/23 1006     Blood Culture Escherichia coli ESBL     Comment:   Consider infectious disease consult.  Susceptibility results may not correlate to clinical outcomes.  For ESBL-producing infections in the blood, a carbapenem is recommended as first-line therapy for optimal clinical outcomes.        Isolated from Aerobic and Anaerobic Bottles     Gram Stain Anaerobic Bottle Gram negative bacilli      Aerobic Bottle Gram negative bacilli    Susceptibility        Escherichia coli ESBL      SANGEETHA      Ertapenem Susceptible      Meropenem Susceptible                       Susceptibility Comments       Escherichia coli ESBL    Cefazolin sensitivity will not be reported  for Enterobacteriaceae in non-urine isolates. If cefazolin is preferred, please call the microbiology lab to request an E-test.  With the exception of urinary-sourced infections, aminoglycosides should not be used as monotherapy.               Blood Culture ID, PCR - Blood, Hand, Left [[id number]]  (Abnormal) Collected: 07/27/23 1239    Lab Status: Final result Specimen: Blood from Hand, Left Updated: 07/28/23 0423     BCID, PCR Escherichia coli. Identification by BCID2 PCR.     BCID, PCR 2 CTX-M (ESBL) detected. Identification by BCID2 PCR     BOTTLE TYPE Aerobic Bottle    Blood Culture - Blood, Arm, Left [[id number]]  (Abnormal) Collected: 07/27/23 1236    Lab Status: Final result Specimen: Blood from Arm, Left Updated: 07/30/23 1006     Blood Culture Escherichia coli ESBL     Comment:   Consider infectious disease consult.  Susceptibility results may not correlate to clinical outcomes.  For ESBL-producing infections in the blood, a carbapenem is recommended as first-line therapy for optimal clinical outcomes.        Isolated from Aerobic and Anaerobic Bottles     Gram Stain Aerobic Bottle Gram negative bacilli      Anaerobic Bottle Gram negative bacilli    Narrative:      Refer to previous blood culture collected on 07/27/2023 1239 for MICs.    COVID PRE-OP / PRE-PROCEDURE SCREENING ORDER (NO ISOLATION) - Swab, Nasopharynx [[id number]]  (Normal) Collected: 07/27/23 1129    Lab Status: Final result Specimen: Swab from Nasopharynx Updated: 07/27/23 1250    Narrative:      The following orders were created for panel order COVID PRE-OP / PRE-PROCEDURE SCREENING ORDER (NO ISOLATION) - Swab, Nasopharynx.  Procedure                               Abnormality         Status                     ---------                               -----------         ------                     COVID-19,CEPHEID/ROSE,CO...[[id number]]  Normal              Final result                 Please view results for these tests on the individual  orders.    COVID-19,CEPHEID/ROSE,COR/AVELINA/PAD/BECKY/MAD IN-HOUSE(OR EMERGENT/ADD-ON),NP SWAB IN TRANSPORT MEDIA 3-4 HR TAT, RT-PCR - Swab, Nasopharynx [[id number]]  (Normal) Collected: 07/27/23 1129    Lab Status: Final result Specimen: Swab from Nasopharynx Updated: 07/27/23 1250     COVID19 Not Detected    Narrative:      Fact sheet for providers: https://www.fda.gov/media/625097/download     Fact sheet for patients: https://www.fda.gov/media/350538/download  Fact sheet for providers: https://www.fda.gov/media/978772/download    Fact sheet for patients: https://www.fda.gov/media/558939/download    Test performed by PCR.    Urine Culture - Urine, Urine, Clean Catch [[id number]]  (Abnormal)  (Susceptibility) Collected: 07/27/23 1124    Lab Status: Final result Specimen: Urine, Clean Catch Updated: 07/29/23 0457     Urine Culture >100,000 CFU/mL Escherichia coli ESBL     Comment:   Consider infectious disease consult.  Susceptibility results may not correlate to clinical outcomes.       Narrative:      Colonization of the urinary tract without infection is common. Treatment is discouraged unless the patient is symptomatic, pregnant, or undergoing an invasive urologic procedure.  Recent outcomes data supports the use of pip/tazo in the treatment of susceptible ESBL infections for uncomplicated UTI. Consider use of pip/tazo as a carbapenem-sparing regimen in applicable patients.    Susceptibility        Escherichia coli ESBL      SANGEETHA      Amikacin Susceptible      Ertapenem Susceptible      Gentamicin Resistant      Levofloxacin Resistant      Meropenem Susceptible      Nitrofurantoin Intermediate      Piperacillin + Tazobactam Susceptible      Tobramycin Resistant      Trimethoprim + Sulfamethoxazole Resistant                                   No results found for: PREGTESTUR, PREGSERUM, HCG, HCGQUANT  Pain Management Panel          Latest Ref Rng & Units 7/27/2023   Pain Management Panel   Creatinine, Urine mg/dL 47.4     Amphetamine, Urine Qual Negative Negative    Barbiturates Screen, Urine Negative Negative    Benzodiazepine Screen, Urine Negative Negative    Buprenorphine, Screen, Urine Negative Negative    Cocaine Screen, Urine Negative Negative    Fentanyl, Urine Negative Negative    Methadone Screen , Urine Negative Negative    Methamphetamine, Ur Negative Negative    I have personally looked at the labs and they are summarized above.  ----------------------------------------------------------------------------------------------------------------------  Detailed radiology reports for the last 24 hours:    Imaging Results (Last 24 Hours)       ** No results found for the last 24 hours. **            Assessment & Plan      #Sepsis due to ESBL E. coli bacteremia  #Urinary tract infection ESBL E. coli, POA  -CT a/p w/cystitis noted. UA with hematuria, pyuria. Urine cx ESBL e/coli sensitive to ertapenem  -7/27 BCX ESBL, 7/28 bcx NGTD  -ID following, midline placed 7/31  -Cont ertapenem until 8/11, unfortunately no PO options available  -Inpatient [facility] consult placed to cont IV abx, not accepting self pay patients. Will likely have to remain inpatient for duration of treatment.     #Acute alcohol withdrawal, exaggerated for secondary gain  #Drug-seeking behavior  #Elevated transaminases  #Chronic pancreatitis  #Alcoholic cirrhosis, early  -Librium taper completed, cont PO vitamin supplementation after completion of IV load  -Plans to try to discharge back to rehab in [place], otherwise no stable housing currently  -Did c/o abd pain 8/1. Repeat lipase wnl and pain resolved without intervention or prn opioids    #Microcytic anemia, suspect chronic  #Thrombocytopenia  -Stable    #Hypertensive urgency, POA   -Improved with carvedilol, increased ARB dose for proteinuria and htn    #Myocardial injury secondary to sepsis  -CP Resolved, Echo w/EF 51-55%    #Pulmonary nodule, needs outpatient follow-up in 3 months  #Proteinuria, protein  creatinine ratio 1.5 g, continue new losartan dose  #Ascending aortic ectasia 3.9 cm noted on CT angiogram, outpatient follow-up  #HCV antibody positive, RNA quant sent and pending  #Chronic pancreatitis due to EtOH use, supportive care  #Hypovolemic hyponatremia, not clinically significant  #Hypomagnesemia, replace per protocol    VTE Prophylaxis:   Mechanical Order History:       None          Pharmalogical Order History:        Ordered     Dose Route Frequency Stop    07/27/23 1811  Enoxaparin Sodium (LOVENOX) syringe 40 mg         40 mg SC Nightly --                    Code Status and Medical Interventions:   Ordered at: 07/27/23 1358     Code Status (Patient has no pulse and is not breathing):    CPR (Attempt to Resuscitate)     Medical Interventions (Patient has pulse or is breathing):    Full Support     Release to patient:    Routine Release         Disposition: Cont inpatient IV abx, given difficult dispo patient may unfortunately discharge to street following completion. SW trying to arrange ride back to [facility] where patient was residing before transport to [place]     I have reviewed any copied/forwarded text or data, verified its accuracy, and updated as necessary above.    Tavo Davis MD  [facility] Hospitalist  08/04/23  12:28 EDT

## 2023-08-04 NOTE — CASE MANAGEMENT/SOCIAL WORK
Discharge Planning Assessment  LIGIA Mcconnell     Patient Name: Edvin Jiménez  MRN: [id number]  Today's Date: 8/4/2023    Admit Date: 7/27/2023     Discharge Plan       Row Name 08/04/23 1112       Plan    Plan CM director contacted [facility] who states they have no pending application for pt and it would take up to 48 hrs before they would receive application if one was filed. [facility] states they are unable to expedite application until if goes over 45 days. SS to follow up on Monday 8/7 with [facility].               EVELYN JaureguiW

## 2023-08-04 NOTE — PROGRESS NOTES
PROGRESS NOTE         Patient Identification:  Name:  Edvin Jiménez  Age:  52 y.o.  Sex:  male  :  1970  MRN:  [id number]  Visit Number:  [id number]  Primary Care Provider:  Provider, No Known         LOS: 8 days       ----------------------------------------------------------------------------------------------------------------------  Subjective       Chief Complaints:    Alcohol Problem        Interval History:      The patient is awake and alert, sitting up comfortably in bed.  On room air with no apparent distress.  Denies any abdominal pain or urinary symptoms.  Denies any diarrhea.  Afebrile.  Lung exam is clear to auscultation bilaterally.  Abdomen is soft nontender with normoactive bowel sounds.      Review of Systems:    Constitutional: no fever, chills and night sweats.    Eyes: no eye drainage, itching or redness.  HEENT: no mouth sores, dysphagia or nose bleed.  Respiratory: no for shortness of breath, cough or production of sputum.  Cardiovascular: no chest pain, no palpitations, no orthopnea.  Gastrointestinal: no nausea, vomiting or diarrhea. No abdominal pain, hematemesis or rectal bleeding.  Genitourinary: no dysuria or polyuria.  Hematologic/lymphatic: no lymph node abnormalities, no easy bruising or easy bleeding.  Musculoskeletal: no muscle or joint pain.  Skin: No rash and no itching.  Neurological: no loss of consciousness, no seizure, no headache.  Behavioral/Psych: no depression or suicidal ideation.  Endocrine: no hot flashes.  Immunologic: negative.    ----------------------------------------------------------------------------------------------------------------------      Objective       \[facility] [place] [facility]\"" Meds:  aspirin, 81 mg, Oral, Daily  atorvastatin, 40 mg, Oral, Nightly  carvedilol, 6.25 mg, Oral, BID With Meals  enoxaparin, 40 mg, Subcutaneous, Nightly  ertapenem, 1,000 mg, Intravenous, Q24H  losartan, 100 mg, Oral, Q24H  NIFEdipine CC, 30 mg, Oral,  Q24H  sodium chloride, 10 mL, Intravenous, Q12H  sodium chloride, 10 mL, Intravenous, Q12H  sodium chloride, 10 mL, Intravenous, Q12H         ----------------------------------------------------------------------------------------------------------------------    Vital Signs:  Temp:  [98.2 øF (36.8 øC)] 98.2 øF (36.8 øC)  Heart Rate:  [80] 80  Resp:  [18] 18  BP: (126)/(90) 126/90  No data found.    SpO2 Percentage    08/02/23 0841 08/02/23 1900 08/03/23 1828   SpO2: 98% 97% 97%     SpO2:  [97 %] 97 %  on   ;   Device (Oxygen Therapy): room air    Body mass index is 20.82 kg/mý.  Wt Readings from Last 3 Encounters:   08/01/23 64 kg (141 lb)        Intake/Output Summary (Last 24 hours) at 8/4/2023 1249  Last data filed at 8/4/2023 0800  Gross per 24 hour   Intake 1422 ml   Output --   Net 1422 ml       Diet: Regular/House Diet; Texture: Regular Texture (IDDSI 7); Fluid Consistency: Thin (IDDSI 0)  ----------------------------------------------------------------------------------------------------------------------      Physical Exam:    Constitutional: Well-developed -American male sitting up comfortably on the edge of the bed.  On room air with no distress.  Denies any complaints.  HENT:  Head: Normocephalic and atraumatic.  Mouth:  Moist mucous membranes.    Eyes:  Conjunctivae and EOM are normal.  No scleral icterus.  Neck:  Neck supple.  No JVD present.    Cardiovascular:  Normal rate, regular rhythm and normal heart sounds with no murmur. No edema.  Pulmonary/Chest:  No respiratory distress, no wheezes, no crackles, with normal breath sounds and good air movement.  Lungs clear to auscultation bilaterally.  Abdominal:  Soft.  Bowel sounds are normal.  No distension and no tenderness.   Musculoskeletal:  No edema, no tenderness, and no deformity.  No swelling or redness of joints.  Neurological:  Alert and oriented to person, place, and time.  No facial droop.  No slurred speech.   Skin:  Skin is warm and  dry.  No rash noted.  No pallor.   Psychiatric:  Normal mood and affect.  Behavior is normal.        ----------------------------------------------------------------------------------------------------------------------                  Results from last 7 days   Lab Units 08/02/23 0014 07/30/23 0405 07/29/23 0018   WBC 10*3/mm3 4.66 3.89 5.93   HEMOGLOBIN g/dL 8.7* 8.2* 8.0*   HEMATOCRIT % 27.8* 26.4* 25.3*   MCV fL 81.8 81.7 80.6   MCHC g/dL 31.3* 31.1* 31.6   PLATELETS 10*3/mm3 180 100* 95*       Results from last 7 days   Lab Units 08/02/23 0014 07/30/23 0405 07/29/23 0018   SODIUM mmol/L 136 138 135*   POTASSIUM mmol/L 4.2 4.2 4.2   CHLORIDE mmol/L 99 103 101   CO2 mmol/L 27.0 27.3 25.0   BUN mg/dL 11 10 11   CREATININE mg/dL 1.02 0.87 1.02   CALCIUM mg/dL 8.7 8.4* 8.1*   GLUCOSE mg/dL 117* 111* 133*   ALBUMIN g/dL 2.9*  --   --    BILIRUBIN mg/dL 0.3  --   --    ALK PHOS U/L 154*  --   --    AST (SGOT) U/L 57*  --   --    ALT (SGPT) U/L 33  --   --      Estimated Creatinine Clearance: 76.7 mL/min (by C-G formula based on SCr of 1.02 mg/dL).  No results found for: AMMONIA    Glucose   Date/Time Value Ref Range Status   08/02/2023 0731 112 70 - 130 mg/dL Final     Comment:     Meter: [id number] : [id number] DENNISMURALI AESNCIO       Lab Results   Component Value Date    HGBA1C 5.60 07/27/2023     Lab Results   Component Value Date    TSH 1.120 07/27/2023       Blood Culture   Date Value Ref Range Status   07/27/2023 Gram Negative Bacilli (C)  Preliminary   07/27/2023 Gram Negative Bacilli (C)  Preliminary     Urine Culture   Date Value Ref Range Status   07/27/2023 >100,000 CFU/mL Escherichia coli ESBL (A)  Final     Comment:       Consider infectious disease consult.  Susceptibility results may not correlate to clinical outcomes.     No results found for: WOUNDCX  No results found for: STOOLCX  No results found for: RESPCX  Pain Management Panel          Latest Ref Rng & Units 7/27/2023   Pain Management  Panel   Creatinine, Urine mg/dL 47.4    Amphetamine, Urine Qual Negative Negative    Barbiturates Screen, Urine Negative Negative    Benzodiazepine Screen, Urine Negative Negative    Buprenorphine, Screen, Urine Negative Negative    Cocaine Screen, Urine Negative Negative    Fentanyl, Urine Negative Negative    Methadone Screen , Urine Negative Negative    Methamphetamine, Ur Negative Negative    ----------------------------------------------------------------------------------------------------------------------  Imaging Results (Last 24 Hours)       ** No results found for the last 24 hours. **            ----------------------------------------------------------------------------------------------------------------------    Pertinent Infectious Disease Results      Presented to [facility] Emergency Department on 7/27/2023 for chest pain and vomiting. Chlamydia and gonorrhea negative.  COVID-19 PCR negative.  Procalcitonin slightly elevated at 0.39.  Lactic acid elevated at 2.3 on admission.  Blood cultures from 7/27/2023 2 out of 2 sets positive for ESBL E. coli.  HIV negative.  Hepatitis C reactive.           Assessment/Plan       Assessment     Severe sepsis with lactic acid greater than 2 on admission  ESBL bacteremia  Possible cystitis        Plan      The patient is awake and alert, sitting up comfortably in bed.  On room air with no apparent distress.  Denies any abdominal pain or urinary symptoms.  Denies any diarrhea.  Afebrile.  Lung exam is clear to auscultation bilaterally.  Abdomen is soft nontender with normoactive bowel sounds.    Recommend to continue with ertapenem 1 g IV every 24 hours for the treatment of ESBL bacteremia with urinary source as scheduled through 8/11/2023.  Unfortunately there is no oral alternative and the patient was unable to transfer to the LTAC or skilled nursing facility so he will likely remain inpatient for the duration of this course.  We will continue to  monitor.      ANTIMICROBIAL THERAPY    ertapenem (INVanz) 1000 mg in 100ml NS VTB     Code Status:   Code Status and Medical Interventions:   Ordered at: 07/27/23 1358     Code Status (Patient has no pulse and is not breathing):    CPR (Attempt to Resuscitate)     Medical Interventions (Patient has pulse or is breathing):    Full Support     Release to patient:    Routine Release       YISSEL Angel  08/04/23  12:49 EDT    Electronically signed by YISSEL Angel, 08/03/23, 12:58 PM EDT.

## 2023-08-05 PROCEDURE — 99232 SBSQ HOSP IP/OBS MODERATE 35: CPT | Performed by: NURSE PRACTITIONER

## 2023-08-05 PROCEDURE — 63710000001 PROMETHAZINE PER 25 MG: Performed by: STUDENT IN AN ORGANIZED HEALTH CARE EDUCATION/TRAINING PROGRAM

## 2023-08-05 PROCEDURE — 25010000002 ENOXAPARIN PER 10 MG: Performed by: STUDENT IN AN ORGANIZED HEALTH CARE EDUCATION/TRAINING PROGRAM

## 2023-08-05 PROCEDURE — 25010000002 ERTAPENEM PER 500 MG: Performed by: NURSE PRACTITIONER

## 2023-08-05 PROCEDURE — 99231 SBSQ HOSP IP/OBS SF/LOW 25: CPT | Performed by: STUDENT IN AN ORGANIZED HEALTH CARE EDUCATION/TRAINING PROGRAM

## 2023-08-05 RX ADMIN — ENOXAPARIN SODIUM 40 MG: 40 INJECTION SUBCUTANEOUS at 20:33

## 2023-08-05 RX ADMIN — CARVEDILOL 6.25 MG: 6.25 TABLET, FILM COATED ORAL at 08:06

## 2023-08-05 RX ADMIN — QUETIAPINE FUMARATE 50 MG: 25 TABLET, FILM COATED ORAL at 20:33

## 2023-08-05 RX ADMIN — IBUPROFEN 800 MG: 400 TABLET, FILM COATED ORAL at 15:35

## 2023-08-05 RX ADMIN — Medication 10 ML: at 20:34

## 2023-08-05 RX ADMIN — CARVEDILOL 6.25 MG: 6.25 TABLET, FILM COATED ORAL at 17:05

## 2023-08-05 RX ADMIN — LOSARTAN POTASSIUM 100 MG: 50 TABLET, FILM COATED ORAL at 08:05

## 2023-08-05 RX ADMIN — ASPIRIN 81 MG: 81 TABLET, CHEWABLE ORAL at 08:05

## 2023-08-05 RX ADMIN — ATORVASTATIN CALCIUM 40 MG: 40 TABLET, FILM COATED ORAL at 20:33

## 2023-08-05 RX ADMIN — QUETIAPINE FUMARATE 50 MG: 25 TABLET, FILM COATED ORAL at 08:06

## 2023-08-05 RX ADMIN — ERTAPENEM SODIUM 1000 MG: 1 INJECTION INTRAMUSCULAR; INTRAVENOUS at 08:04

## 2023-08-05 RX ADMIN — ACETAMINOPHEN 650 MG: 325 TABLET ORAL at 22:49

## 2023-08-05 RX ADMIN — Medication 10 ML: at 08:06

## 2023-08-05 RX ADMIN — NIFEDIPINE 30 MG: 30 TABLET, EXTENDED RELEASE ORAL at 08:05

## 2023-08-05 RX ADMIN — IBUPROFEN 800 MG: 400 TABLET, FILM COATED ORAL at 08:18

## 2023-08-05 RX ADMIN — PROMETHAZINE HYDROCHLORIDE 25 MG: 25 TABLET ORAL at 15:35

## 2023-08-05 NOTE — NURSING NOTE
Patient pushed call adis, RN entered room to answer. Patient begins to yell and curse saying that he was in pain. RN administered pain and nausea medicine per orders.

## 2023-08-05 NOTE — PLAN OF CARE
Goal Outcome Evaluation:  Plan of Care Reviewed With: patient           Outcome Evaluation: Patient has been agitated and verbally abusive this shift. Patient is now resting in bed with multiple requests. Requests met by RN. No acute changes, will continue POC.

## 2023-08-05 NOTE — NURSING NOTE
"Code Blue alarm sounded on [address] to patient's room. RN entered room to find patient sitting on the edge of bed. Patient stated \"I don't give a f**k about no button I want my coffee\" RN educated patient about the usage of code blue button. Patient continuously yelling \"get the f**k out of my face\" and repeatedly referred to RN as \"white girl.\" Patient continues to utilize call light, RN/PCA responds, patient continues to press call bell although he states he has no other requests.   "

## 2023-08-05 NOTE — PROGRESS NOTES
[facility] HOSPITALIST PROGRESS NOTE     Patient Identification:  Name:  Edvin Jiménez  Age:  52 y.o.  Sex:  male  :  1970  MRN:  [id number]  Visit Number:  [id number]  ROOM: [address] [place] [address]     Primary Care Provider:  Rosa, No Known     Date of Admission: 2023    Length of stay in inpatient status:  9    Subjective     Chief Compliant:    Chief Complaint   Patient presents with    Alcohol Problem       Patient resting in bed this am during rounds. Pushed code blue button this am because he wasn't given his coffee as quickly as he would have preferred        Objective     Current Hospital Meds:  aspirin, 81 mg, Oral, Daily  atorvastatin, 40 mg, Oral, Nightly  carvedilol, 6.25 mg, Oral, BID With Meals  enoxaparin, 40 mg, Subcutaneous, Nightly  ertapenem, 1,000 mg, Intravenous, Q24H  losartan, 100 mg, Oral, Q24H  NIFEdipine CC, 30 mg, Oral, Q24H  sodium chloride, 10 mL, Intravenous, Q12H  sodium chloride, 10 mL, Intravenous, Q12H  sodium chloride, 10 mL, Intravenous, Q12H       Current Antimicrobial Therapy:  Anti-Infectives (From admission, onward)      Ordered     Dose/Rate Route Frequency Start Stop    23 0444  ertapenem (INVanz) 1,000 mg in sodium chloride 0.9 % 100 mL IVPB-VTB        Ordering Provider: Liyah Manriquez APRN    1,000 mg  200 mL/hr over 30 Minutes Intravenous Every 24 Hours 23 0800 23 0759    23 1146  piperacillin-tazobactam (ZOSYN) IVPB 3.375 g in 100 mL NS VTB        Ordering Provider: Cloby Lam DO    3.375 g  over 30 Minutes Intravenous Once 23 1202 23 1330          Current Diuretic Therapy:  Diuretics (From admission, onward)      None          ----------------------------------------------------------------------------------------------------------------------  Vital Signs:  Temp:  [97.8 øF (36.6 øC)-98.9 øF (37.2 øC)] 97.8 øF (36.6 øC)  Heart Rate:  [64-78] 71  Resp:  [16-20] 16  BP: (125-150)/() 140/80  SpO2:  [96  %] 96 %  on   ;   Device (Oxygen Therapy): room air  Body mass index is 20.82 kg/mý.    Wt Readings from Last 3 Encounters:   08/01/23 64 kg (141 lb)     Intake & Output (last 3 days)         08/02 0701 08/03 0700 08/03 0701  08/04 0700 08/04 0701 08/05 0700 08/05 0701  08/06 0700    P.O. 1320 1200 1620 840    I.V. (mL/kg)  102 (1.6)      Total Intake(mL/kg) 1320 (20.6) 1302 (20.3) 1620 (25.3) 840 (13.1)    Net +1320 +1302 +1620 +840            Urine Unmeasured Occurrence 7 x 3 x 6 x 1 x    Stool Unmeasured Occurrence  0 x            Diet: Regular/House Diet; Texture: Regular Texture (IDDSI 7); Fluid Consistency: Thin (IDDSI 0)  ----------------------------------------------------------------------------------------------------------------------  Physical Exam  Vitals and nursing note reviewed.   Constitutional:       General: He is not in acute distress.  HENT:      Head: Normocephalic and atraumatic.      Mouth/Throat:      Mouth: Mucous membranes are moist.      Pharynx: Oropharynx is clear.   Eyes:      General: No scleral icterus.     Extraocular Movements: Extraocular movements intact.   Cardiovascular:      Rate and Rhythm: Normal rate.      Pulses: Normal pulses.      Heart sounds: No murmur heard.  Pulmonary:      Effort: Pulmonary effort is normal. No respiratory distress.   Abdominal:      General: Abdomen is flat.      Palpations: Abdomen is soft.   Musculoskeletal:      Right lower leg: No edema.      Left lower leg: No edema.   Skin:     General: Skin is warm and dry.      Capillary Refill: Capillary refill takes less than 2 seconds.   Neurological:      General: No focal deficit present.      Mental Status: He is alert and oriented to person, place, and time.   Psychiatric:         Mood and Affect: Mood normal.         Behavior: Behavior normal.      ----------------------------------------------------------------------------------------------------------------------    ----------------------------------------------------------------------------------------------------------------------  LABS:    CBC and coagulation:  Results from last 7 days   Lab Units 08/02/23 0014 07/30/23  0405   WBC 10*3/mm3 4.66 3.89   HEMOGLOBIN g/dL 8.7* 8.2*   HEMATOCRIT % 27.8* 26.4*   MCV fL 81.8 81.7   MCHC g/dL 31.3* 31.1*   PLATELETS 10*3/mm3 180 100*       Acid/base balance:      Renal and electrolytes:  Results from last 7 days   Lab Units 08/02/23 0014 07/30/23  0405   SODIUM mmol/L 136 138   POTASSIUM mmol/L 4.2 4.2   CHLORIDE mmol/L 99 103   CO2 mmol/L 27.0 27.3   BUN mg/dL 11 10   CREATININE mg/dL 1.02 0.87   CALCIUM mg/dL 8.7 8.4*   GLUCOSE mg/dL 117* 111*       Estimated Creatinine Clearance: 76.7 mL/min (by C-G formula based on SCr of 1.02 mg/dL).    Liver and pancreatic function:  Results from last 7 days   Lab Units 08/02/23  0014   ALBUMIN g/dL 2.9*   BILIRUBIN mg/dL 0.3   ALK PHOS U/L 154*   AST (SGOT) U/L 57*   ALT (SGPT) U/L 33   LIPASE U/L 18       Endocrine function:  Lab Results   Component Value Date    HGBA1C 5.60 07/27/2023     Point of care bedside glucose levels:  Results from last 7 days   Lab Units 08/02/23  0731   GLUCOSE mg/dL 112       Glucose levels from the CMP:  Results from last 7 days   Lab Units 08/02/23  0014 07/30/23  0405   GLUCOSE mg/dL 117* 111*       Lab Results   Component Value Date    TSH 1.120 07/27/2023     Cardiac:              Cultures:  Lab Results   Component Value Date    COLORU Red (A) 07/27/2023    CLARITYU Turbid (A) 07/27/2023    PHUR 7.0 07/27/2023    PROTEINUR 77.5 07/27/2023    GLUCOSEU Negative 07/27/2023    KETONESU Negative 07/27/2023    BLOODU Large (3+) (A) 07/27/2023    NITRITEU Negative 07/27/2023    LEUKOCYTESUR Large (3+) (A) 07/27/2023    BILIRUBINUR Small (1+) (A) 07/27/2023    UROBILINOGEN 1.0 E.U./dL  07/27/2023    RBCUA Too Numerous to Count (A) 07/27/2023    WBCUA Too Numerous to Count (A) 07/27/2023    BACTERIA None Seen 07/27/2023     Microbiology Results (last 10 days)       Procedure Component Value - Date/Time    Blood Culture - Blood, Wrist, Right [[id number]]  (Normal) Collected: 07/28/23 1200    Lab Status: Final result Specimen: Blood from Wrist, Right Updated: 08/02/23 1300     Blood Culture No growth at 5 days    Blood Culture - Blood, Arm, Left [[id number]]  (Normal) Collected: 07/28/23 1148    Lab Status: Final result Specimen: Blood from Arm, Left Updated: 08/02/23 1300     Blood Culture No growth at 5 days    Chlamydia trachomatis, Neisseria gonorrhoeae, PCR - Urine, Urine, Random Void [[id number]]  (Normal) Collected: 07/27/23 1855    Lab Status: Final result Specimen: Urine, Random Void Updated: 07/27/23 2031     Chlamydia DNA by PCR Not Detected     Neisseria gonorrhoeae by PCR Not Detected    Narrative:      PCR testing performed using target DNA sequences.    Blood Culture - Blood, Hand, Left [[id number]]  (Abnormal)  (Susceptibility) Collected: 07/27/23 1239    Lab Status: Final result Specimen: Blood from Hand, Left Updated: 07/30/23 1006     Blood Culture Escherichia coli ESBL     Comment:   Consider infectious disease consult.  Susceptibility results may not correlate to clinical outcomes.  For ESBL-producing infections in the blood, a carbapenem is recommended as first-line therapy for optimal clinical outcomes.        Isolated from Aerobic and Anaerobic Bottles     Gram Stain Anaerobic Bottle Gram negative bacilli      Aerobic Bottle Gram negative bacilli    Susceptibility        Escherichia coli ESBL      SANGEETHA      Ertapenem Susceptible      Meropenem Susceptible                       Susceptibility Comments       Escherichia coli ESBL    Cefazolin sensitivity will not be reported for Enterobacteriaceae in non-urine isolates. If cefazolin is preferred, please call the microbiology lab to  request an E-test.  With the exception of urinary-sourced infections, aminoglycosides should not be used as monotherapy.               Blood Culture ID, PCR - Blood, Hand, Left [[id number]]  (Abnormal) Collected: 07/27/23 1239    Lab Status: Final result Specimen: Blood from Hand, Left Updated: 07/28/23 0423     BCID, PCR Escherichia coli. Identification by BCID2 PCR.     BCID, PCR 2 CTX-M (ESBL) detected. Identification by BCID2 PCR     BOTTLE TYPE Aerobic Bottle    Blood Culture - Blood, Arm, Left [[id number]]  (Abnormal) Collected: 07/27/23 1236    Lab Status: Final result Specimen: Blood from Arm, Left Updated: 07/30/23 1006     Blood Culture Escherichia coli ESBL     Comment:   Consider infectious disease consult.  Susceptibility results may not correlate to clinical outcomes.  For ESBL-producing infections in the blood, a carbapenem is recommended as first-line therapy for optimal clinical outcomes.        Isolated from Aerobic and Anaerobic Bottles     Gram Stain Aerobic Bottle Gram negative bacilli      Anaerobic Bottle Gram negative bacilli    Narrative:      Refer to previous blood culture collected on 07/27/2023 1239 for MICs.    COVID PRE-OP / PRE-PROCEDURE SCREENING ORDER (NO ISOLATION) - Swab, Nasopharynx [[id number]]  (Normal) Collected: 07/27/23 1129    Lab Status: Final result Specimen: Swab from Nasopharynx Updated: 07/27/23 1250    Narrative:      The following orders were created for panel order COVID PRE-OP / PRE-PROCEDURE SCREENING ORDER (NO ISOLATION) - Swab, Nasopharynx.  Procedure                               Abnormality         Status                     ---------                               -----------         ------                     COVID-19,CEPHEID/ROSE,CO...[[id number]]  Normal              Final result                 Please view results for these tests on the individual orders.    COVID-19,CEPHEID/ROSE,COR/AVELINA/PAD/BECKY/MAD IN-HOUSE(OR EMERGENT/ADD-ON),NP SWAB IN TRANSPORT MEDIA 3-4  HR TAT, RT-PCR - Swab, Nasopharynx [[id number]]  (Normal) Collected: 07/27/23 1129    Lab Status: Final result Specimen: Swab from Nasopharynx Updated: 07/27/23 1250     COVID19 Not Detected    Narrative:      Fact sheet for providers: https://www.fda.gov/media/707234/download     Fact sheet for patients: https://www.fda.gov/media/902251/download  Fact sheet for providers: https://www.fda.gov/media/279795/download    Fact sheet for patients: https://www.fda.gov/media/511564/download    Test performed by PCR.    Urine Culture - Urine, Urine, Clean Catch [[id number]]  (Abnormal)  (Susceptibility) Collected: 07/27/23 1124    Lab Status: Final result Specimen: Urine, Clean Catch Updated: 07/29/23 0457     Urine Culture >100,000 CFU/mL Escherichia coli ESBL     Comment:   Consider infectious disease consult.  Susceptibility results may not correlate to clinical outcomes.       Narrative:      Colonization of the urinary tract without infection is common. Treatment is discouraged unless the patient is symptomatic, pregnant, or undergoing an invasive urologic procedure.  Recent outcomes data supports the use of pip/tazo in the treatment of susceptible ESBL infections for uncomplicated UTI. Consider use of pip/tazo as a carbapenem-sparing regimen in applicable patients.    Susceptibility        Escherichia coli ESBL      SANGEETHA      Amikacin Susceptible      Ertapenem Susceptible      Gentamicin Resistant      Levofloxacin Resistant      Meropenem Susceptible      Nitrofurantoin Intermediate      Piperacillin + Tazobactam Susceptible      Tobramycin Resistant      Trimethoprim + Sulfamethoxazole Resistant                                   No results found for: PREGTESTUR, PREGSERUM, HCG, HCGQUANT  Pain Management Panel          Latest Ref Rng & Units 7/27/2023   Pain Management Panel   Creatinine, Urine mg/dL 47.4    Amphetamine, Urine Qual Negative Negative    Barbiturates Screen, Urine Negative Negative    Benzodiazepine  Screen, Urine Negative Negative    Buprenorphine, Screen, Urine Negative Negative    Cocaine Screen, Urine Negative Negative    Fentanyl, Urine Negative Negative    Methadone Screen , Urine Negative Negative    Methamphetamine, Ur Negative Negative    I have personally looked at the labs and they are summarized above.  ----------------------------------------------------------------------------------------------------------------------  Detailed radiology reports for the last 24 hours:    Imaging Results (Last 24 Hours)       ** No results found for the last 24 hours. **            Assessment & Plan      #Sepsis due to ESBL E. coli bacteremia  #Urinary tract infection ESBL E. coli, POA  -CT a/p w/cystitis noted. UA with hematuria, pyuria. Urine cx ESBL e/coli sensitive to ertapenem  -7/27 BCX ESBL, 7/28 bcx NGTD  -ID following, midline placed 7/31  -Cont ertapenem until 8/11, unfortunately no PO options available  -Inpatient [facility] consult placed to cont IV abx, not accepting self pay patients. Will likely have to remain inpatient for duration of treatment.     #Acute alcohol withdrawal, exaggerated for secondary gain  #Drug-seeking behavior  #Elevated transaminases  #Chronic pancreatitis  #Alcoholic cirrhosis, early  -Librium taper completed, cont PO vitamin supplementation after completion of IV load  -Plans to try to discharge back to rehab in [place], otherwise no stable housing currently  -Did c/o abd pain 8/1. Repeat lipase wnl and pain resolved without intervention or prn opioids    #Microcytic anemia, suspect chronic  #Thrombocytopenia  -Stable    #Hypertensive urgency, POA   -Improved with carvedilol, increased ARB dose for proteinuria and htn    #Myocardial injury secondary to sepsis  -CP Resolved, Echo w/EF 51-55%    #Pulmonary nodule, needs outpatient follow-up in 3 months  #Proteinuria, protein creatinine ratio 1.5 g, continue new losartan dose  #Ascending aortic ectasia 3.9 cm noted on CT angiogram, outpatient  follow-up  #HCV antibody positive, RNA quant sent and pending  #Chronic pancreatitis due to EtOH use, supportive care  #Hypovolemic hyponatremia, not clinically significant  #Hypomagnesemia, replace per protocol    VTE Prophylaxis:   Mechanical Order History:       None          Pharmalogical Order History:        Ordered     Dose Route Frequency Stop    07/27/23 1811  Enoxaparin Sodium (LOVENOX) syringe 40 mg         40 mg SC Nightly --                    Code Status and Medical Interventions:   Ordered at: 07/27/23 3289     Code Status (Patient has no pulse and is not breathing):    CPR (Attempt to Resuscitate)     Medical Interventions (Patient has pulse or is breathing):    Full Support     Release to patient:    Routine Release         Disposition: Cont inpatient IV abx, given difficult dispo patient may unfortunately discharge to street following completion. SW trying to arrange ride back to [facility] where patient was residing before transport to [place]     I have reviewed any copied/forwarded text or data, verified its accuracy, and updated as necessary above.    Tavo Davis MD  [facility] Hospitalist  08/05/23  15:27 EDT

## 2023-08-05 NOTE — PLAN OF CARE
Goal Outcome Evaluation:  Plan of Care Reviewed With: patient        Progress: no change  Outcome Evaluation: Pt was very agitated at the begining of shift. He was lying in the floor and being hostile towards staff. PRN seroquel given and pt rested throughout the night. VSS. no acute changes. Plan of care ongoing.

## 2023-08-05 NOTE — PROGRESS NOTES
PROGRESS NOTE         Patient Identification:  Name:  Edvin Jiménez  Age:  52 y.o.  Sex:  male  :  1970  MRN:  [id number]  Visit Number:  [id number]  Primary Care Provider:  Provider, No Known         LOS: 9 days       ----------------------------------------------------------------------------------------------------------------------  Subjective       Chief Complaints:    Alcohol Problem        Interval History:      The patient is resting comfortably in bed, on room air with no apparent distress.  Denies any further urinary symptoms, abdominal pain, shortness of breath.  Afebrile.  Denies diarrhea.  Lung exam is clear to auscultation bilaterally.  Abdomen soft nontender with normoactive bowel sounds.    Review of Systems:    Constitutional: no fever, chills and night sweats.    Eyes: no eye drainage, itching or redness.  HEENT: no mouth sores, dysphagia or nose bleed.  Respiratory: no for shortness of breath, cough or production of sputum.  Cardiovascular: no chest pain, no palpitations, no orthopnea.  Gastrointestinal: no nausea, vomiting or diarrhea. No abdominal pain, hematemesis or rectal bleeding.  Genitourinary: no dysuria or polyuria.  Hematologic/lymphatic: no lymph node abnormalities, no easy bruising or easy bleeding.  Musculoskeletal: no muscle or joint pain.  Skin: No rash and no itching.  Neurological: no loss of consciousness, no seizure, no headache.  Behavioral/Psych: no depression or suicidal ideation.  Endocrine: no hot flashes.  Immunologic: negative.    ----------------------------------------------------------------------------------------------------------------------      Objective       [facility] Meds:  aspirin, 81 mg, Oral, Daily  atorvastatin, 40 mg, Oral, Nightly  carvedilol, 6.25 mg, Oral, BID With Meals  enoxaparin, 40 mg, Subcutaneous, Nightly  ertapenem, 1,000 mg, Intravenous, Q24H  losartan, 100 mg, Oral, Q24H  NIFEdipine CC, 30 mg, Oral,  Q24H  sodium chloride, 10 mL, Intravenous, Q12H  sodium chloride, 10 mL, Intravenous, Q12H  sodium chloride, 10 mL, Intravenous, Q12H         ----------------------------------------------------------------------------------------------------------------------    Vital Signs:  Temp:  [97.8 øF (36.6 øC)-98.9 øF (37.2 øC)] 97.8 øF (36.6 øC)  Heart Rate:  [64-78] 71  Resp:  [16-20] 16  BP: (125-150)/() 140/80  Mean Arterial Pressure (Non-Invasive) for the past 24 hrs (Last 3 readings):   Noninvasive MAP (mmHg)   08/05/23 0610 123   08/04/23 1836 124       SpO2 Percentage    08/02/23 1900 08/03/23 1828 08/05/23 0610   SpO2: 97% 97% 96%     SpO2:  [96 %] 96 %  on   ;   Device (Oxygen Therapy): room air    Body mass index is 20.82 kg/mý.  Wt Readings from Last 3 Encounters:   08/01/23 64 kg (141 lb)        Intake/Output Summary (Last 24 hours) at 8/5/2023 1216  Last data filed at 8/5/2023 0800  Gross per 24 hour   Intake 1020 ml   Output --   Net 1020 ml       Diet: Regular/House Diet; Texture: Regular Texture (IDDSI 7); Fluid Consistency: Thin (IDDSI 0)  ----------------------------------------------------------------------------------------------------------------------      Physical Exam:    Constitutional: Well-developed -American male resting comfortably in bed, on room air with no distress.  Denies any complaints.    HENT:  Head: Normocephalic and atraumatic.  Mouth:  Moist mucous membranes.    Eyes:  Conjunctivae and EOM are normal.  No scleral icterus.  Neck:  Neck supple.  No JVD present.    Cardiovascular:  Normal rate, regular rhythm and normal heart sounds with no murmur. No edema.  Pulmonary/Chest:  No respiratory distress, no wheezes, no crackles, with normal breath sounds and good air movement.  Lungs clear to auscultation bilaterally.  Abdominal:  Soft.  Bowel sounds are normal.  No distension and no tenderness.   Musculoskeletal:  No edema, no tenderness, and no deformity.  No swelling or  redness of joints.  Neurological:  Alert and oriented to person, place, and time.  No facial droop.  No slurred speech.   Skin:  Skin is warm and dry.  No rash noted.  No pallor.   Psychiatric:  Normal mood and affect.  Behavior is normal.        ----------------------------------------------------------------------------------------------------------------------                  Results from last 7 days   Lab Units 08/02/23  0014 07/30/23  0405   WBC 10*3/mm3 4.66 3.89   HEMOGLOBIN g/dL 8.7* 8.2*   HEMATOCRIT % 27.8* 26.4*   MCV fL 81.8 81.7   MCHC g/dL 31.3* 31.1*   PLATELETS 10*3/mm3 180 100*       Results from last 7 days   Lab Units 08/02/23  0014 07/30/23  0405   SODIUM mmol/L 136 138   POTASSIUM mmol/L 4.2 4.2   CHLORIDE mmol/L 99 103   CO2 mmol/L 27.0 27.3   BUN mg/dL 11 10   CREATININE mg/dL 1.02 0.87   CALCIUM mg/dL 8.7 8.4*   GLUCOSE mg/dL 117* 111*   ALBUMIN g/dL 2.9*  --    BILIRUBIN mg/dL 0.3  --    ALK PHOS U/L 154*  --    AST (SGOT) U/L 57*  --    ALT (SGPT) U/L 33  --      Estimated Creatinine Clearance: 76.7 mL/min (by C-G formula based on SCr of 1.02 mg/dL).  No results found for: AMMONIA    No results found for: HGBA1C, POCGLU      Lab Results   Component Value Date    HGBA1C 5.60 07/27/2023     Lab Results   Component Value Date    TSH 1.120 07/27/2023       Blood Culture   Date Value Ref Range Status   07/27/2023 Gram Negative Bacilli (C)  Preliminary   07/27/2023 Gram Negative Bacilli (C)  Preliminary     Urine Culture   Date Value Ref Range Status   07/27/2023 >100,000 CFU/mL Escherichia coli ESBL (A)  Final     Comment:       Consider infectious disease consult.  Susceptibility results may not correlate to clinical outcomes.     No results found for: WOUNDCX  No results found for: STOOLCX  No results found for: RESPCX  Pain Management Panel          Latest Ref Rng & Units 7/27/2023   Pain Management Panel   Creatinine, Urine mg/dL 47.4    Amphetamine, Urine Qual Negative Negative     Barbiturates Screen, Urine Negative Negative    Benzodiazepine Screen, Urine Negative Negative    Buprenorphine, Screen, Urine Negative Negative    Cocaine Screen, Urine Negative Negative    Fentanyl, Urine Negative Negative    Methadone Screen , Urine Negative Negative    Methamphetamine, Ur Negative Negative    ----------------------------------------------------------------------------------------------------------------------  Imaging Results (Last 24 Hours)       ** No results found for the last 24 hours. **            ----------------------------------------------------------------------------------------------------------------------    Pertinent Infectious Disease Results      Presented to [facility] Emergency Department on 7/27/2023 for chest pain and vomiting. Chlamydia and gonorrhea negative.  COVID-19 PCR negative.  Procalcitonin slightly elevated at 0.39.  Lactic acid elevated at 2.3 on admission.  Blood cultures from 7/27/2023 2 out of 2 sets positive for ESBL E. coli.  HIV negative.  Hepatitis C reactive.           Assessment/Plan       Assessment     Severe sepsis with lactic acid greater than 2 on admission  ESBL bacteremia  Possible cystitis        Plan      The patient is resting comfortably in bed, on room air with no apparent distress.  Denies any further urinary symptoms, abdominal pain, shortness of breath.  Afebrile.  Denies diarrhea.  Lung exam is clear to auscultation bilaterally.  Abdomen soft nontender with normoactive bowel sounds.    Continue with ertapenem 1 g IV every 24 hours for the treatment of ESBL bacteremia with urinary source as scheduled through 8/11/2023.    ANTIMICROBIAL THERAPY    ertapenem (INVanz) 1000 mg in 100ml NS VTB     Code Status:   Code Status and Medical Interventions:   Ordered at: 07/27/23 1358     Code Status (Patient has no pulse and is not breathing):    CPR (Attempt to Resuscitate)     Medical Interventions (Patient has pulse or is breathing):     Full Support     Release to patient:    Routine Release       YISSEL Angel  08/05/23  12:16 EDT    Electronically signed by YISSEL Angel, 08/03/23, 12:58 PM EDT.

## 2023-08-06 PROCEDURE — 25010000002 ERTAPENEM PER 500 MG: Performed by: NURSE PRACTITIONER

## 2023-08-06 PROCEDURE — 99231 SBSQ HOSP IP/OBS SF/LOW 25: CPT | Performed by: STUDENT IN AN ORGANIZED HEALTH CARE EDUCATION/TRAINING PROGRAM

## 2023-08-06 PROCEDURE — 63710000001 ONDANSETRON ODT 4 MG TABLET DISPERSIBLE: Performed by: STUDENT IN AN ORGANIZED HEALTH CARE EDUCATION/TRAINING PROGRAM

## 2023-08-06 PROCEDURE — 25010000002 ENOXAPARIN PER 10 MG: Performed by: STUDENT IN AN ORGANIZED HEALTH CARE EDUCATION/TRAINING PROGRAM

## 2023-08-06 PROCEDURE — 99232 SBSQ HOSP IP/OBS MODERATE 35: CPT | Performed by: NURSE PRACTITIONER

## 2023-08-06 PROCEDURE — 63710000001 PROMETHAZINE PER 25 MG: Performed by: STUDENT IN AN ORGANIZED HEALTH CARE EDUCATION/TRAINING PROGRAM

## 2023-08-06 RX ORDER — LIDOCAINE HYDROCHLORIDE 20 MG/ML
15 SOLUTION OROPHARYNGEAL ONCE
Status: COMPLETED | OUTPATIENT
Start: 2023-08-07 | End: 2023-08-07

## 2023-08-06 RX ORDER — ALUMINA, MAGNESIA, AND SIMETHICONE 2400; 2400; 240 MG/30ML; MG/30ML; MG/30ML
15 SUSPENSION ORAL ONCE
Status: COMPLETED | OUTPATIENT
Start: 2023-08-07 | End: 2023-08-07

## 2023-08-06 RX ADMIN — Medication 10 ML: at 08:33

## 2023-08-06 RX ADMIN — CARVEDILOL 6.25 MG: 6.25 TABLET, FILM COATED ORAL at 17:06

## 2023-08-06 RX ADMIN — ACETAMINOPHEN 650 MG: 325 TABLET ORAL at 22:22

## 2023-08-06 RX ADMIN — IBUPROFEN 800 MG: 400 TABLET, FILM COATED ORAL at 08:32

## 2023-08-06 RX ADMIN — Medication 10 ML: at 20:49

## 2023-08-06 RX ADMIN — IBUPROFEN 800 MG: 400 TABLET, FILM COATED ORAL at 20:48

## 2023-08-06 RX ADMIN — ONDANSETRON 4 MG: 4 TABLET, ORALLY DISINTEGRATING ORAL at 22:26

## 2023-08-06 RX ADMIN — NIFEDIPINE 30 MG: 30 TABLET, EXTENDED RELEASE ORAL at 08:33

## 2023-08-06 RX ADMIN — LOSARTAN POTASSIUM 100 MG: 50 TABLET, FILM COATED ORAL at 08:32

## 2023-08-06 RX ADMIN — ENOXAPARIN SODIUM 40 MG: 40 INJECTION SUBCUTANEOUS at 20:48

## 2023-08-06 RX ADMIN — ACETAMINOPHEN 650 MG: 325 TABLET ORAL at 05:29

## 2023-08-06 RX ADMIN — ERTAPENEM SODIUM 1000 MG: 1 INJECTION INTRAMUSCULAR; INTRAVENOUS at 08:33

## 2023-08-06 RX ADMIN — PROMETHAZINE HYDROCHLORIDE 25 MG: 25 TABLET ORAL at 20:48

## 2023-08-06 RX ADMIN — ATORVASTATIN CALCIUM 40 MG: 40 TABLET, FILM COATED ORAL at 20:48

## 2023-08-06 RX ADMIN — CARVEDILOL 6.25 MG: 6.25 TABLET, FILM COATED ORAL at 08:32

## 2023-08-06 RX ADMIN — ASPIRIN 81 MG: 81 TABLET, CHEWABLE ORAL at 08:32

## 2023-08-06 RX ADMIN — QUETIAPINE FUMARATE 50 MG: 25 TABLET, FILM COATED ORAL at 20:48

## 2023-08-06 RX ADMIN — QUETIAPINE FUMARATE 50 MG: 25 TABLET, FILM COATED ORAL at 08:56

## 2023-08-06 RX ADMIN — PROMETHAZINE HYDROCHLORIDE 25 MG: 25 TABLET ORAL at 08:56

## 2023-08-06 NOTE — PROGRESS NOTES
[facility] HOSPITALIST PROGRESS NOTE     Patient Identification:  Name:  Edvin Jiménez  Age:  52 y.o.  Sex:  male  :  1970  MRN:  [id number]  Visit Number:  [id number]  ROOM: [address] [place] [address]     Primary Care Provider:  Rosa, Alexandrea Known     Date of Admission: 2023    Length of stay in inpatient status:  10    Subjective     Chief Compliant:    Chief Complaint   Patient presents with    Alcohol Problem       Patient again with episodes of unruly behavior requiring house supervisor intervention. Acting out and verbally abusive but not physically threatening toward any staff. Agreeable this am and resting comfortably in room.         Objective     Current Hospital Meds:  aspirin, 81 mg, Oral, Daily  atorvastatin, 40 mg, Oral, Nightly  carvedilol, 6.25 mg, Oral, BID With Meals  enoxaparin, 40 mg, Subcutaneous, Nightly  ertapenem, 1,000 mg, Intravenous, Q24H  losartan, 100 mg, Oral, Q24H  NIFEdipine CC, 30 mg, Oral, Q24H  sodium chloride, 10 mL, Intravenous, Q12H  sodium chloride, 10 mL, Intravenous, Q12H  sodium chloride, 10 mL, Intravenous, Q12H       Current Antimicrobial Therapy:  Anti-Infectives (From admission, onward)      Ordered     Dose/Rate Route Frequency Start Stop    23 0444  ertapenem (INVanz) 1,000 mg in sodium chloride 0.9 % 100 mL IVPB-VTB        Ordering Provider: Liyah Manriquez APRN    1,000 mg  200 mL/hr over 30 Minutes Intravenous Every 24 Hours 23 0800 23 0759    23 1146  piperacillin-tazobactam (ZOSYN) IVPB 3.375 g in 100 mL NS VTB        Ordering Provider: Colby Lam DO    3.375 g  over 30 Minutes Intravenous Once 23 1202 23 1330          Current Diuretic Therapy:  Diuretics (From admission, onward)      None          ----------------------------------------------------------------------------------------------------------------------  Vital Signs:  Temp:  [97.8 øF (36.6 øC)-98.7 øF (37.1 øC)] 97.8 øF (36.6 øC)  Heart Rate:   [63-82] 63  Resp:  [16-20] 20  BP: (129-156)/(82-90) 156/82     on   ;   Device (Oxygen Therapy): room air  Body mass index is 20.82 kg/mý.    Wt Readings from Last 3 Encounters:   08/01/23 64 kg (141 lb)     Intake & Output (last 3 days)         08/03 0701  08/04 0700 08/04 0701  08/05 0700 08/05 0701  08/06 0700 08/06 0701  08/07 0700    P.O. 1200 1620 2000 240    I.V. (mL/kg) 102 (1.6)       Total Intake(mL/kg) 1302 (20.3) 1620 (25.3) 2000 (31.3) 240 (3.8)    Net +1302 +1620 +2000 +240            Urine Unmeasured Occurrence 3 x 6 x 12 x     Stool Unmeasured Occurrence 0 x             Diet: Regular/House Diet; Texture: Regular Texture (IDDSI 7); Fluid Consistency: Thin (IDDSI 0)  ----------------------------------------------------------------------------------------------------------------------  Physical Exam  Vitals and nursing note reviewed.   Constitutional:       General: He is not in acute distress.  HENT:      Head: Normocephalic and atraumatic.      Mouth/Throat:      Mouth: Mucous membranes are moist.      Pharynx: Oropharynx is clear.   Eyes:      General: No scleral icterus.     Extraocular Movements: Extraocular movements intact.   Cardiovascular:      Rate and Rhythm: Normal rate.      Pulses: Normal pulses.      Heart sounds: No murmur heard.  Pulmonary:      Effort: Pulmonary effort is normal. No respiratory distress.   Abdominal:      General: Abdomen is flat.      Palpations: Abdomen is soft.   Musculoskeletal:      Right lower leg: No edema.      Left lower leg: No edema.   Skin:     General: Skin is warm and dry.      Capillary Refill: Capillary refill takes less than 2 seconds.   Neurological:      General: No focal deficit present.      Mental Status: He is alert and oriented to person, place, and time.   Psychiatric:         Mood and Affect: Mood normal.         Behavior: Behavior normal.      ----------------------------------------------------------------------------------------------------------------------    ----------------------------------------------------------------------------------------------------------------------  LABS:    CBC and coagulation:  Results from last 7 days   Lab Units 08/02/23  0014   WBC 10*3/mm3 4.66   HEMOGLOBIN g/dL 8.7*   HEMATOCRIT % 27.8*   MCV fL 81.8   MCHC g/dL 31.3*   PLATELETS 10*3/mm3 180       Acid/base balance:      Renal and electrolytes:  Results from last 7 days   Lab Units 08/02/23  0014   SODIUM mmol/L 136   POTASSIUM mmol/L 4.2   CHLORIDE mmol/L 99   CO2 mmol/L 27.0   BUN mg/dL 11   CREATININE mg/dL 1.02   CALCIUM mg/dL 8.7   GLUCOSE mg/dL 117*       Estimated Creatinine Clearance: 76.7 mL/min (by C-G formula based on SCr of 1.02 mg/dL).    Liver and pancreatic function:  Results from last 7 days   Lab Units 08/02/23  0014   ALBUMIN g/dL 2.9*   BILIRUBIN mg/dL 0.3   ALK PHOS U/L 154*   AST (SGOT) U/L 57*   ALT (SGPT) U/L 33   LIPASE U/L 18       Endocrine function:  Lab Results   Component Value Date    HGBA1C 5.60 07/27/2023     Point of care bedside glucose levels:  Results from last 7 days   Lab Units 08/02/23  0731   GLUCOSE mg/dL 112       Glucose levels from the CMP:  Results from last 7 days   Lab Units 08/02/23  0014   GLUCOSE mg/dL 117*       Lab Results   Component Value Date    TSH 1.120 07/27/2023     Cardiac:              Cultures:  Lab Results   Component Value Date    COLORU Red (A) 07/27/2023    CLARITYU Turbid (A) 07/27/2023    PHUR 7.0 07/27/2023    PROTEINUR 77.5 07/27/2023    GLUCOSEU Negative 07/27/2023    KETONESU Negative 07/27/2023    BLOODU Large (3+) (A) 07/27/2023    NITRITEU Negative 07/27/2023    LEUKOCYTESUR Large (3+) (A) 07/27/2023    BILIRUBINUR Small (1+) (A) 07/27/2023    UROBILINOGEN 1.0 E.U./dL 07/27/2023    RBCUA Too Numerous to Count (A) 07/27/2023    WBCUA Too Numerous to Count (A) 07/27/2023    BACTERIA None  Seen 07/27/2023     Microbiology Results (last 10 days)       Procedure Component Value - Date/Time    Blood Culture - Blood, Wrist, Right [[id number]]  (Normal) Collected: 07/28/23 1200    Lab Status: Final result Specimen: Blood from Wrist, Right Updated: 08/02/23 1300     Blood Culture No growth at 5 days    Blood Culture - Blood, Arm, Left [[id number]]  (Normal) Collected: 07/28/23 1148    Lab Status: Final result Specimen: Blood from Arm, Left Updated: 08/02/23 1300     Blood Culture No growth at 5 days    Chlamydia trachomatis, Neisseria gonorrhoeae, PCR - Urine, Urine, Random Void [[id number]]  (Normal) Collected: 07/27/23 1855    Lab Status: Final result Specimen: Urine, Random Void Updated: 07/27/23 2031     Chlamydia DNA by PCR Not Detected     Neisseria gonorrhoeae by PCR Not Detected    Narrative:      PCR testing performed using target DNA sequences.    Blood Culture - Blood, Hand, Left [[id number]]  (Abnormal)  (Susceptibility) Collected: 07/27/23 1239    Lab Status: Final result Specimen: Blood from Hand, Left Updated: 07/30/23 1006     Blood Culture Escherichia coli ESBL     Comment:   Consider infectious disease consult.  Susceptibility results may not correlate to clinical outcomes.  For ESBL-producing infections in the blood, a carbapenem is recommended as first-line therapy for optimal clinical outcomes.        Isolated from Aerobic and Anaerobic Bottles     Gram Stain Anaerobic Bottle Gram negative bacilli      Aerobic Bottle Gram negative bacilli    Susceptibility        Escherichia coli ESBL      SANGEETHA      Ertapenem Susceptible      Meropenem Susceptible                       Susceptibility Comments       Escherichia coli ESBL    Cefazolin sensitivity will not be reported for Enterobacteriaceae in non-urine isolates. If cefazolin is preferred, please call the microbiology lab to request an E-test.  With the exception of urinary-sourced infections, aminoglycosides should not be used as monotherapy.                Blood Culture ID, PCR - Blood, Hand, Left [[id number]]  (Abnormal) Collected: 07/27/23 1239    Lab Status: Final result Specimen: Blood from Hand, Left Updated: 07/28/23 0423     BCID, PCR Escherichia coli. Identification by BCID2 PCR.     BCID, PCR 2 CTX-M (ESBL) detected. Identification by BCID2 PCR     BOTTLE TYPE Aerobic Bottle    Blood Culture - Blood, Arm, Left [[id number]]  (Abnormal) Collected: 07/27/23 1236    Lab Status: Final result Specimen: Blood from Arm, Left Updated: 07/30/23 1006     Blood Culture Escherichia coli ESBL     Comment:   Consider infectious disease consult.  Susceptibility results may not correlate to clinical outcomes.  For ESBL-producing infections in the blood, a carbapenem is recommended as first-line therapy for optimal clinical outcomes.        Isolated from Aerobic and Anaerobic Bottles     Gram Stain Aerobic Bottle Gram negative bacilli      Anaerobic Bottle Gram negative bacilli    Narrative:      Refer to previous blood culture collected on 07/27/2023 1239 for MICs.    COVID PRE-OP / PRE-PROCEDURE SCREENING ORDER (NO ISOLATION) - Swab, Nasopharynx [[id number]]  (Normal) Collected: 07/27/23 1129    Lab Status: Final result Specimen: Swab from Nasopharynx Updated: 07/27/23 1250    Narrative:      The following orders were created for panel order COVID PRE-OP / PRE-PROCEDURE SCREENING ORDER (NO ISOLATION) - Swab, Nasopharynx.  Procedure                               Abnormality         Status                     ---------                               -----------         ------                     COVID-19,CEPHEID/ROSE,CO...[[id number]]  Normal              Final result                 Please view results for these tests on the individual orders.    COVID-19,CEPHEID/ROSE,COR/AVELINA/PAD/BECKY/MAD IN-HOUSE(OR EMERGENT/ADD-ON),NP SWAB IN TRANSPORT MEDIA 3-4 HR TAT, RT-PCR - Swab, Nasopharynx [[id number]]  (Normal) Collected: 07/27/23 1129    Lab Status: Final result Specimen:  Swab from Nasopharynx Updated: 07/27/23 1250     COVID19 Not Detected    Narrative:      Fact sheet for providers: https://www.fda.gov/media/834288/download     Fact sheet for patients: https://www.fda.gov/media/102782/download  Fact sheet for providers: https://www.fda.gov/media/123985/download    Fact sheet for patients: https://www.fda.gov/media/125064/download    Test performed by PCR.    Urine Culture - Urine, Urine, Clean Catch [[id number]]  (Abnormal)  (Susceptibility) Collected: 07/27/23 1124    Lab Status: Final result Specimen: Urine, Clean Catch Updated: 07/29/23 0457     Urine Culture >100,000 CFU/mL Escherichia coli ESBL     Comment:   Consider infectious disease consult.  Susceptibility results may not correlate to clinical outcomes.       Narrative:      Colonization of the urinary tract without infection is common. Treatment is discouraged unless the patient is symptomatic, pregnant, or undergoing an invasive urologic procedure.  Recent outcomes data supports the use of pip/tazo in the treatment of susceptible ESBL infections for uncomplicated UTI. Consider use of pip/tazo as a carbapenem-sparing regimen in applicable patients.    Susceptibility        Escherichia coli ESBL      SANGEETHA      Amikacin Susceptible      Ertapenem Susceptible      Gentamicin Resistant      Levofloxacin Resistant      Meropenem Susceptible      Nitrofurantoin Intermediate      Piperacillin + Tazobactam Susceptible      Tobramycin Resistant      Trimethoprim + Sulfamethoxazole Resistant                                   No results found for: PREGTESTUR, PREGSERUM, HCG, HCGQUANT  Pain Management Panel          Latest Ref Rng & Units 7/27/2023   Pain Management Panel   Creatinine, Urine mg/dL 47.4    Amphetamine, Urine Qual Negative Negative    Barbiturates Screen, Urine Negative Negative    Benzodiazepine Screen, Urine Negative Negative    Buprenorphine, Screen, Urine Negative Negative    Cocaine Screen, Urine Negative Negative     Fentanyl, Urine Negative Negative    Methadone Screen , Urine Negative Negative    Methamphetamine, Ur Negative Negative    I have personally looked at the labs and they are summarized above.  ----------------------------------------------------------------------------------------------------------------------  Detailed radiology reports for the last 24 hours:    Imaging Results (Last 24 Hours)       ** No results found for the last 24 hours. **            Assessment & Plan      #Sepsis due to ESBL E. coli bacteremia  #Urinary tract infection ESBL E. coli, POA  -CT a/p w/cystitis noted. UA with hematuria, pyuria. Urine cx ESBL e/coli sensitive to ertapenem  -7/27 BCX ESBL, 7/28 bcx NGTD  -ID following, midline placed 7/31  -Cont ertapenem until 8/11, unfortunately no PO options available  -Inpatient [facility] consult placed to cont IV abx, not accepting self pay patients. Will likely have to remain inpatient for duration of treatment.     #Acute alcohol withdrawal, exaggerated for secondary gain  #Drug-seeking behavior  #Elevated transaminases  #Chronic pancreatitis  #Alcoholic cirrhosis, early  -Librium taper completed, cont PO vitamin supplementation after completion of IV load  -Plans to try to discharge back to rehab in [place], otherwise no stable housing currently  -Did c/o abd pain 8/1. Repeat lipase wnl and pain resolved without intervention or prn opioids    #Microcytic anemia, suspect chronic  #Thrombocytopenia  -Stable    #Hypertensive urgency, POA   -Improved with carvedilol, increased ARB dose for proteinuria and htn    #Myocardial injury secondary to sepsis  -CP Resolved, Echo w/EF 51-55%    #Pulmonary nodule, needs outpatient follow-up in 3 months  #Proteinuria, protein creatinine ratio 1.5 g, continue new losartan dose  #Ascending aortic ectasia 3.9 cm noted on CT angiogram, outpatient follow-up  #HCV antibody positive, RNA quant sent and pending  #Chronic pancreatitis due to EtOH use, supportive  care  #Hypovolemic hyponatremia, not clinically significant  #Hypomagnesemia, replace per protocol    VTE Prophylaxis:   Mechanical Order History:       None          Pharmalogical Order History:        Ordered     Dose Route Frequency Stop    07/27/23 1811  Enoxaparin Sodium (LOVENOX) syringe 40 mg         40 mg SC Nightly --                    Code Status and Medical Interventions:   Ordered at: 07/27/23 2663     Code Status (Patient has no pulse and is not breathing):    CPR (Attempt to Resuscitate)     Medical Interventions (Patient has pulse or is breathing):    Full Support     Release to patient:    Routine Release         Disposition: Cont inpatient IV abx, given difficult dispo patient may unfortunately discharge to street following completion. SW trying to arrange ride back [facility] where patient was residing before transport to [place]     I have reviewed any copied/forwarded text or data, verified its accuracy, and updated as necessary above.    Tavo Davis MD  [facility] Hospitalist  08/06/23  11:08 EDT

## 2023-08-06 NOTE — NURSING NOTE
Patient called out wanting something for a cold, per PCA. RN entered room to see exactly what the patient was asking for. Patient was yelling and cursing about having a headache and why we couldn't we go get him something for it. Security and House supervisor was called, talked to patient about showing respect to the staff. Gave patient PRN tylenol for headache.

## 2023-08-06 NOTE — PROGRESS NOTES
PROGRESS NOTE         Patient Identification:  Name:  Edvin Jiménez  Age:  52 y.o.  Sex:  male  :  1970  MRN:  [id number]  Visit Number:  [id number]  Primary Care Provider:  Provider, No Known         LOS: 10 days       ----------------------------------------------------------------------------------------------------------------------  Subjective       Chief Complaints:    Alcohol Problem        Interval History:      The patient is awake and alert, sitting up comfortably in bed.  On room air with no distress.  Denies any complaints this morning.  Denies any urinary symptoms, abdominal pain.  Afebrile.  Denies diarrhea.  Lung exam is clear to auscultation bilaterally.  Abdomen is soft and nontender with normoactive bowel sounds.    Review of Systems:    Constitutional: no fever, chills and night sweats.    Eyes: no eye drainage, itching or redness.  HEENT: no mouth sores, dysphagia or nose bleed.  Respiratory: no for shortness of breath, cough or production of sputum.  Cardiovascular: no chest pain, no palpitations, no orthopnea.  Gastrointestinal: no nausea, vomiting or diarrhea. No abdominal pain, hematemesis or rectal bleeding.  Genitourinary: no dysuria or polyuria.  Hematologic/lymphatic: no lymph node abnormalities, no easy bruising or easy bleeding.  Musculoskeletal: no muscle or joint pain.  Skin: No rash and no itching.  Neurological: no loss of consciousness, no seizure, no headache.  Behavioral/Psych: no depression or suicidal ideation.  Endocrine: no hot flashes.  Immunologic: negative.    ----------------------------------------------------------------------------------------------------------------------      Objective       [facility] Meds:  aspirin, 81 mg, Oral, Daily  atorvastatin, 40 mg, Oral, Nightly  carvedilol, 6.25 mg, Oral, BID With Meals  enoxaparin, 40 mg, Subcutaneous, Nightly  ertapenem, 1,000 mg, Intravenous, Q24H  losartan, 100 mg, Oral, Q24H  NIFEdipine  CC, 30 mg, Oral, Q24H  sodium chloride, 10 mL, Intravenous, Q12H  sodium chloride, 10 mL, Intravenous, Q12H  sodium chloride, 10 mL, Intravenous, Q12H         ----------------------------------------------------------------------------------------------------------------------    Vital Signs:  Temp:  [97.8 øF (36.6 øC)-98.7 øF (37.1 øC)] 97.8 øF (36.6 øC)  Heart Rate:  [63-82] 63  Resp:  [16-20] 20  BP: (129-156)/(82-90) 156/82  No data found.    SpO2 Percentage    08/02/23 1900 08/03/23 1828 08/05/23 0610   SpO2: 97% 97% 96%        on   ;   Device (Oxygen Therapy): room air    Body mass index is 20.82 kg/mý.  Wt Readings from Last 3 Encounters:   08/01/23 64 kg (141 lb)        Intake/Output Summary (Last 24 hours) at 8/6/2023 1035  Last data filed at 8/6/2023 0600  Gross per 24 hour   Intake 1640 ml   Output --   Net 1640 ml       Diet: Regular/House Diet; Texture: Regular Texture (IDDSI 7); Fluid Consistency: Thin (IDDSI 0)  ----------------------------------------------------------------------------------------------------------------------      Physical Exam:    Constitutional: Well-developed -American male resting comfortably in bed.  On room air with no apparent distress.  Denies complaints.  HENT:  Head: Normocephalic and atraumatic.  Mouth:  Moist mucous membranes.    Eyes:  Conjunctivae and EOM are normal.  No scleral icterus.  Neck:  Neck supple.  No JVD present.    Cardiovascular:  Normal rate, regular rhythm and normal heart sounds with no murmur. No edema.  Pulmonary/Chest:  No respiratory distress, no wheezes, no crackles, with normal breath sounds and good air movement.  Lungs clear to auscultation bilaterally.  Abdominal:  Soft.  Bowel sounds are normal.  No distension and no tenderness.   Musculoskeletal:  No edema, no tenderness, and no deformity.  No swelling or redness of joints.  Neurological:  Alert and oriented to person, place, and time.  No facial droop.  No slurred speech.   Skin:   Skin is warm and dry.  No rash noted.  No pallor.   Psychiatric:  Normal mood and affect.  Behavior is normal.        ----------------------------------------------------------------------------------------------------------------------                  Results from last 7 days   Lab Units 08/02/23  0014   WBC 10*3/mm3 4.66   HEMOGLOBIN g/dL 8.7*   HEMATOCRIT % 27.8*   MCV fL 81.8   MCHC g/dL 31.3*   PLATELETS 10*3/mm3 180       Results from last 7 days   Lab Units 08/02/23  0014   SODIUM mmol/L 136   POTASSIUM mmol/L 4.2   CHLORIDE mmol/L 99   CO2 mmol/L 27.0   BUN mg/dL 11   CREATININE mg/dL 1.02   CALCIUM mg/dL 8.7   GLUCOSE mg/dL 117*   ALBUMIN g/dL 2.9*   BILIRUBIN mg/dL 0.3   ALK PHOS U/L 154*   AST (SGOT) U/L 57*   ALT (SGPT) U/L 33     Estimated Creatinine Clearance: 76.7 mL/min (by C-G formula based on SCr of 1.02 mg/dL).  No results found for: AMMONIA    No results found for: HGBA1C, POCGLU      Lab Results   Component Value Date    HGBA1C 5.60 07/27/2023     Lab Results   Component Value Date    TSH 1.120 07/27/2023       Blood Culture   Date Value Ref Range Status   07/27/2023 Gram Negative Bacilli (C)  Preliminary   07/27/2023 Gram Negative Bacilli (C)  Preliminary     Urine Culture   Date Value Ref Range Status   07/27/2023 >100,000 CFU/mL Escherichia coli ESBL (A)  Final     Comment:       Consider infectious disease consult.  Susceptibility results may not correlate to clinical outcomes.     No results found for: WOUNDCX  No results found for: STOOLCX  No results found for: RESPCX  Pain Management Panel          Latest Ref Rng & Units 7/27/2023   Pain Management Panel   Creatinine, Urine mg/dL 47.4    Amphetamine, Urine Qual Negative Negative    Barbiturates Screen, Urine Negative Negative    Benzodiazepine Screen, Urine Negative Negative    Buprenorphine, Screen, Urine Negative Negative    Cocaine Screen, Urine Negative Negative    Fentanyl, Urine Negative Negative    Methadone Screen , Urine  Negative Negative    Methamphetamine, Ur Negative Negative    ----------------------------------------------------------------------------------------------------------------------  Imaging Results (Last 24 Hours)       ** No results found for the last 24 hours. **            ----------------------------------------------------------------------------------------------------------------------    Pertinent Infectious Disease Results      [facility] for chest pain and vomiting. Chlamydia and gonorrhea negative.  COVID-19 PCR negative.  Procalcitonin slightly elevated at 0.39.  Lactic acid elevated at 2.3 on admission.  Blood cultures from 7/27/2023 2 out of 2 sets positive for ESBL E. coli.  HIV negative.  Hepatitis C reactive.           Assessment/Plan       Assessment     Severe sepsis with lactic acid greater than 2 on admission  ESBL bacteremia  Possible cystitis        Plan      The patient is awake and alert, sitting up comfortably in bed.  On room air with no distress.  Denies any complaints this morning.  Denies any urinary symptoms, abdominal pain.  Afebrile.  Denies diarrhea.  Lung exam is clear to auscultation bilaterally.  Abdomen is soft and nontender with normoactive bowel sounds.    Continue with ertapenem 1 g IV every 24 hours for treatment of ESBL bacteremia with urinary source, as scheduled through 8/11/2023.        ANTIMICROBIAL THERAPY    ertapenem (INVanz) 1000 mg in 100ml NS VTB     Code Status:   Code Status and Medical Interventions:   Ordered at: 07/27/23 1358     Code Status (Patient has no pulse and is not breathing):    CPR (Attempt to Resuscitate)     Medical Interventions (Patient has pulse or is breathing):    Full Support     Release to patient:    Routine Release       YISSEL Angel  08/06/23  10:35 EDT    Electronically signed by YISSEL Angel, 08/03/23, 12:58 PM EDT.

## 2023-08-06 NOTE — PLAN OF CARE
Goal Outcome Evaluation:           Progress: no change  Outcome Evaluation: pt has been resting in bed. prn meds given for nasuea. pt calm and pleasent this shift. no other changes to note at this time; plan of care ongoing

## 2023-08-06 NOTE — PLAN OF CARE
Goal Outcome Evaluation:  Plan of Care Reviewed With: patient        Progress: no change  Outcome Evaluation: Patient resting in bed. Patient c/o of headache, became agitated, yelling and cursing at staff. Security and House Supervisor called. PRN meds given, see MAR. Will continue with plan of care.

## 2023-08-07 LAB
ANION GAP SERPL CALCULATED.3IONS-SCNC: 7.8 MMOL/L (ref 5–15)
BUN SERPL-MCNC: 11 MG/DL (ref 6–20)
BUN/CREAT SERPL: 8.3 (ref 7–25)
CALCIUM SPEC-SCNC: 9.1 MG/DL (ref 8.6–10.5)
CHLORIDE SERPL-SCNC: 103 MMOL/L (ref 98–107)
CO2 SERPL-SCNC: 25.2 MMOL/L (ref 22–29)
CREAT SERPL-MCNC: 1.33 MG/DL (ref 0.76–1.27)
DEPRECATED RDW RBC AUTO: 59.1 FL (ref 37–54)
EGFRCR SERPLBLD CKD-EPI 2021: 64.3 ML/MIN/1.73
ERYTHROCYTE [DISTWIDTH] IN BLOOD BY AUTOMATED COUNT: 19.5 % (ref 12.3–15.4)
GLUCOSE SERPL-MCNC: 129 MG/DL (ref 65–99)
HCT VFR BLD AUTO: 28.3 % (ref 37.5–51)
HGB BLD-MCNC: 8.6 G/DL (ref 13–17.7)
MCH RBC QN AUTO: 25.2 PG (ref 26.6–33)
MCHC RBC AUTO-ENTMCNC: 30.4 G/DL (ref 31.5–35.7)
MCV RBC AUTO: 83 FL (ref 79–97)
PLATELET # BLD AUTO: 332 10*3/MM3 (ref 140–450)
PMV BLD AUTO: 10.3 FL (ref 6–12)
POTASSIUM SERPL-SCNC: 4.6 MMOL/L (ref 3.5–5.2)
RBC # BLD AUTO: 3.41 10*6/MM3 (ref 4.14–5.8)
SODIUM SERPL-SCNC: 136 MMOL/L (ref 136–145)
WBC NRBC COR # BLD: 5.13 10*3/MM3 (ref 3.4–10.8)

## 2023-08-07 PROCEDURE — 85027 COMPLETE CBC AUTOMATED: CPT | Performed by: STUDENT IN AN ORGANIZED HEALTH CARE EDUCATION/TRAINING PROGRAM

## 2023-08-07 PROCEDURE — 25010000002 ENOXAPARIN PER 10 MG: Performed by: STUDENT IN AN ORGANIZED HEALTH CARE EDUCATION/TRAINING PROGRAM

## 2023-08-07 PROCEDURE — 99232 SBSQ HOSP IP/OBS MODERATE 35: CPT | Performed by: STUDENT IN AN ORGANIZED HEALTH CARE EDUCATION/TRAINING PROGRAM

## 2023-08-07 PROCEDURE — 63710000001 PROMETHAZINE PER 25 MG: Performed by: STUDENT IN AN ORGANIZED HEALTH CARE EDUCATION/TRAINING PROGRAM

## 2023-08-07 PROCEDURE — 99232 SBSQ HOSP IP/OBS MODERATE 35: CPT | Performed by: INTERNAL MEDICINE

## 2023-08-07 PROCEDURE — 25010000002 ERTAPENEM PER 500 MG: Performed by: NURSE PRACTITIONER

## 2023-08-07 PROCEDURE — 80048 BASIC METABOLIC PNL TOTAL CA: CPT | Performed by: STUDENT IN AN ORGANIZED HEALTH CARE EDUCATION/TRAINING PROGRAM

## 2023-08-07 RX ADMIN — LIDOCAINE HYDROCHLORIDE 15 ML: 20 SOLUTION ORAL; TOPICAL at 01:31

## 2023-08-07 RX ADMIN — ACETAMINOPHEN 650 MG: 325 TABLET ORAL at 21:33

## 2023-08-07 RX ADMIN — ATORVASTATIN CALCIUM 40 MG: 40 TABLET, FILM COATED ORAL at 21:33

## 2023-08-07 RX ADMIN — ENOXAPARIN SODIUM 40 MG: 40 INJECTION SUBCUTANEOUS at 21:33

## 2023-08-07 RX ADMIN — ACETAMINOPHEN 650 MG: 325 TABLET ORAL at 14:12

## 2023-08-07 RX ADMIN — ERTAPENEM SODIUM 1000 MG: 1 INJECTION INTRAMUSCULAR; INTRAVENOUS at 08:46

## 2023-08-07 RX ADMIN — Medication 10 ML: at 21:33

## 2023-08-07 RX ADMIN — Medication 10 ML: at 08:48

## 2023-08-07 RX ADMIN — ALUMINUM HYDROXIDE, MAGNESIUM HYDROXIDE, AND DIMETHICONE 15 ML: 400; 400; 40 SUSPENSION ORAL at 01:27

## 2023-08-07 RX ADMIN — ASPIRIN 81 MG: 81 TABLET, CHEWABLE ORAL at 08:48

## 2023-08-07 RX ADMIN — CARVEDILOL 6.25 MG: 6.25 TABLET, FILM COATED ORAL at 08:48

## 2023-08-07 RX ADMIN — CARVEDILOL 6.25 MG: 6.25 TABLET, FILM COATED ORAL at 17:14

## 2023-08-07 RX ADMIN — QUETIAPINE FUMARATE 50 MG: 25 TABLET, FILM COATED ORAL at 21:33

## 2023-08-07 RX ADMIN — LOSARTAN POTASSIUM 100 MG: 50 TABLET, FILM COATED ORAL at 08:48

## 2023-08-07 RX ADMIN — PROMETHAZINE HYDROCHLORIDE 25 MG: 25 TABLET ORAL at 21:33

## 2023-08-07 RX ADMIN — NIFEDIPINE 30 MG: 30 TABLET, EXTENDED RELEASE ORAL at 08:48

## 2023-08-07 NOTE — PLAN OF CARE
Goal Outcome Evaluation:  Plan of Care Reviewed With: patient        Progress: no change  Outcome Evaluation: Patient's VSS. Pt was verbally aggressive this morning. Pt c/o of pain 1x & PRN medication given. Pt refused bed alarm & get up and ambulates by himself. Pt voiced no further concerns at this time.

## 2023-08-07 NOTE — PROGRESS NOTES
"    [facility] HOSPITALIST PROGRESS NOTE     Patient Identification:  Name:  Edvin Jiménez  Age:  52 y.o.  Sex:  male  :  1970  MRN:  [id number]  Visit Number:  [id number]  ROOM: [address] [place] [address]     Primary Care Provider:  Provider, No Known    Length of stay in inpatient status:  11    Subjective     Chief Compliant:    Chief Complaint   Patient presents with    Alcohol Problem       History of Presenting Illness:    Patient seen in follow-up for alcohol withdrawal and new ESBL E. coli bacteremia.  Patient is awake, alert and oriented x4 this morning.  There has been no medical changes throughout the past week aside from behavioral issues which include cursing, racial slurs towards staff and care team.  He hit the CODE BLUE button several times over the weekend when nurses was \"not bringing my coffee quick enough \".    Objective     Current Hospital Meds:aspirin, 81 mg, Oral, Daily  atorvastatin, 40 mg, Oral, Nightly  carvedilol, 6.25 mg, Oral, BID With Meals  enoxaparin, 40 mg, Subcutaneous, Nightly  ertapenem, 1,000 mg, Intravenous, Q24H  losartan, 100 mg, Oral, Q24H  NIFEdipine CC, 30 mg, Oral, Q24H  sodium chloride, 10 mL, Intravenous, Q12H  sodium chloride, 10 mL, Intravenous, Q12H  sodium chloride, 10 mL, Intravenous, Q12H           Current Antimicrobial Therapy:  Anti-Infectives (From admission, onward)      Ordered     Dose/Rate Route Frequency Start Stop    23 0444  ertapenem (INVanz) 1,000 mg in sodium chloride 0.9 % 100 mL IVPB-VTB        Ordering Provider: Liyah Manriquez APRN    1,000 mg  200 mL/hr over 30 Minutes Intravenous Every 24 Hours 23 0800 23 0759    23 1146  piperacillin-tazobactam (ZOSYN) IVPB 3.375 g in 100 mL NS VTB        Ordering Provider: Colby Lam DO    3.375 g  over 30 Minutes Intravenous Once 23 1202 23 1330          Current Diuretic Therapy:  Diuretics (From admission, onward)      None      "     ----------------------------------------------------------------------------------------------------------------------  Vital Signs:  Temp:  [98.3 øF (36.8 øC)-98.6 øF (37 øC)] 98.6 øF (37 øC)  Heart Rate:  [78-85] 78  Resp:  [18-20] 20  BP: (132-135)/(90-99) 132/90  SpO2:  [94 %] 94 %  on   ;   Device (Oxygen Therapy): room air  Body mass index is 20.82 kg/mý.    Wt Readings from Last 3 Encounters:   08/01/23 64 kg (141 lb)     Intake & Output (last 3 days)         08/04 0701  08/05 0700 08/05 0701  08/06 0700 08/06 0701  08/07 0700 08/07 0701  08/08 0700    P.O. 1620 2000 600 240    I.V. (mL/kg)   100 (1.6)     Total Intake(mL/kg) 1620 (25.3) 2000 (31.3) 700 (10.9) 240 (3.8)    Net +1620 +2000 +700 +240            Urine Unmeasured Occurrence 6 x 12 x 4 x 2 x          Diet: Regular/House Diet; Texture: Regular Texture (IDDSI 7); Fluid Consistency: Thin (IDDSI 0)  ----------------------------------------------------------------------------------------------------------------------  Physical exam:  Constitutional: Older -American male, nontoxic, disheveled appearing, Well-developed and well-nourished, resting comfortably in bed, no acute distress.      HENT:  Head:  Normocephalic and atraumatic.  Mouth:  Moist mucous membranes.    Eyes:  Conjunctivae and EOM are normal. No scleral icterus.   Cardiovascular:  Normal rate, regular rhythm and normal heart sounds with no murmur. No JVD.   Pulmonary/Chest:  No respiratory distress, no wheezes, no crackles, with normal breath sounds and good air movement. Unlabored. No accessory muscle use.  Abdominal:  Soft. No distension and no tenderness.  Bowel sounds present. No rebound or guarding.   Musculoskeletal:  No tenderness, and no deformity.  No red or swollen joints anywhere.    Neurological:  Alert and oriented to person, place, and time.  No cranial nerve deficit.   Nonfocal.   Skin:  Skin is warm and dry. No rash noted. No pallor.   Peripheral vascular:  No  clubbing, no cyanosis, no edema. Pedal and tibial pulses 2 out of 4 bilaterally.     ----------------------------------------------------------------------------------------------------------------------  Results from last 7 days   Lab Units 08/07/23  0120 08/02/23  0014   WBC 10*3/mm3 5.13 4.66   HEMOGLOBIN g/dL 8.6* 8.7*   HEMATOCRIT % 28.3* 27.8*   MCV fL 83.0 81.8   MCHC g/dL 30.4* 31.3*   PLATELETS 10*3/mm3 332 180         Results from last 7 days   Lab Units 08/07/23  0120 08/02/23  0014   SODIUM mmol/L 136 136   POTASSIUM mmol/L 4.6 4.2   CHLORIDE mmol/L 103 99   CO2 mmol/L 25.2 27.0   BUN mg/dL 11 11   CREATININE mg/dL 1.33* 1.02   CALCIUM mg/dL 9.1 8.7   GLUCOSE mg/dL 129* 117*   ALBUMIN g/dL  --  2.9*   BILIRUBIN mg/dL  --  0.3   ALK PHOS U/L  --  154*   AST (SGOT) U/L  --  57*   ALT (SGPT) U/L  --  33   Estimated Creatinine Clearance: 58.8 mL/min (A) (by C-G formula based on SCr of 1.33 mg/dL (H)).  No results found for: AMMONIA                  No results found for: HGBA1C, POCGLU    Lab Results   Component Value Date    TSH 1.120 07/27/2023     No results found for: PREGTESTUR, PREGSERUM, HCG, HCGQUANT  Pain Management Panel          Latest Ref Rng & Units 7/27/2023   Pain Management Panel   Creatinine, Urine mg/dL 47.4    Amphetamine, Urine Qual Negative Negative    Barbiturates Screen, Urine Negative Negative    Benzodiazepine Screen, Urine Negative Negative    Buprenorphine, Screen, Urine Negative Negative    Cocaine Screen, Urine Negative Negative    Fentanyl, Urine Negative Negative    Methadone Screen , Urine Negative Negative    Methamphetamine, Ur Negative Negative      Brief Urine Lab Results  (Last result in the past 365 days)        Color   Clarity   Blood   Leuk Est   Nitrite   Protein   CREAT   Urine HCG        07/27/23 1124             47.4         07/27/23 1124 Red  Comment: Dipstick results may be inaccurate due to color interference.       Turbid   Large (3+)   Large (3+)   Negative    "100 mg/dL (2+)                 Blood Culture   Date Value Ref Range Status   07/27/2023 Abnormal Stain (C)  Preliminary   07/27/2023 Abnormal Stain (C)  Preliminary     Urine Culture   Date Value Ref Range Status   07/27/2023 >100,000 CFU/mL Gram Negative Bacilli (A)  Preliminary     No results found for: WOUNDCX  No results found for: STOOLCX  No results found for: RESPCX  No results found for: AFBCX          I have personally looked at the labs and they are summarized above.  ----------------------------------------------------------------------------------------------------------------------  Detailed radiology reports for the last 24 hours:  Imaging Results (Last 24 Hours)       ** No results found for the last 24 hours. **          Assessment & Plan      Patient is a 52-year-old -American male with history significant for chronic alcohol abuse and hypertension who presented to the ER for alcohol detox.    #Sepsis due to ESBL E. coli bacteremia  #Urinary tract infection ESBL E. coli, POA  --Initial Tmax 101 in the ER, initially suspected to be withdrawal fever however cultures quickly grew out ESBL E. coli.  Tmax overnight 100.7.  --Admit labs: Lactic 2.3, Pro-Quang 0.4  --CT abdomen/pelvis noted cystitis  --UA with 3+ hematuria, 3+ leuks, +1 g protein but no bacteria?  --Urine culture ESBL E. coli  --Blood cultures 2/2 positive for ESBL E. Coli  --Repeat culture 7/28 NGTD  --Continue ertapenem, end date 8/11  --Infectious disease following, appreciate recommendations    #Acute alcohol withdrawal, exaggerated for secondary gain  #Drug-seeking behavior  #Elevated transaminases  #Chronic pancreatitis  #Alcoholic cirrhosis, early  --Patient presented to the ER for alcohol detox.  Patient stated he usually drinks \"6 quarts\" of Melquiades Beam or \"whatever I can get \". Started drinking heavily approximately 2 years ago.  Last drink on 7/26-allegedly  --Admit serum alcohol negative  --no steroids d/t farheen " score  --Labs: AST//73, T. bili 0.8, alk phos 190, INR 1  --CT w/out evidence of cirrhosis  --Issues over the weekend -tolerating p.o. diet without any issue-despite repetitive complaints of abdominal pain requesting specifically IV morphine or Dilaudid-this happened on 7/28 after he consumed 3 turkey boxes without any nausea or vomiting-has had no issues since  --liver US tiny amount of ascites  --prn bowel regimen, prn lactulose-has been having 2-3 bms daily  --Continue p.o. Librium taper, continue thiamine, multivitamins and folate    #Microcytic anemia, suspect chronic  #Thrombocytopenia  --Hemoglobin 10.1, MCV 78, suspect chronic but no prior labs available.  Thrombocytopenia likely multifactorial due to alcohol abuse versus sepsis mediated due to ESBL bacteremia  --Hemoglobin 8.2 this a.m., only form of ABL was hematuria    #Hypertensive urgency, POA  --Likely has hypertension at baseline and is untreated due to medication none compliance and nonadherence  --Continue Coreg to 6.25 twice daily + increased new losartan (proteinuria), as needed labetalol, will adjust p.o. antihypertensive regimen as needed-if he remains hypertensive, would add nifedipine next    #NSTEMI, type 2  #Atypical chest pain  --due to the above. Patient c/0 of atypical chest pain on admission, asymptomatic since  --Initial troponin 24, repeat 22 with a -2 delta   --EKG noted NSR without acute ST or T wave changes  --Echocardiogram noted an EF of 51 to 55%  --Continue beta-blocker, aspirin/statin, ARB  --Outpatient cardiology follow-up    #Pulmonary nodule, needs outpatient follow-up in 3 months  #Proteinuria, protein creatinine ratio 1.5 g, continue new losartan  #Ascending aortic ectasia 3.9 cm noted on CT angiogram, outpatient follow-up  #HCV antibody positive, RNA quant sent  #Chronic pancreatitis due to EtOH use, supportive care  #Hypovolemic hyponatremia, not clinically significant  #Hypomagnesemia, replace per  protocol    CHECKLIST:  Abx: Invanz  VTE: Lovenox  GI ppx: PPI  Diet: Consistent carb  Code: CPR, full  Dispo: Patient will be discharged when antibiotics are complete.    Christian Chavez DO  [facility] Hospitalist  08/07/23  11:33 EDT

## 2023-08-07 NOTE — PROGRESS NOTES
PROGRESS NOTE         Patient Identification:  Name:  Edvin Jiménez  Age:  52 y.o.  Sex:  male  :  1970  MRN:  [id number]  Visit Number:  [id number]  Primary Care Provider:  Provider, No Known         LOS: 11 days       ----------------------------------------------------------------------------------------------------------------------  Subjective       Chief Complaints:    Alcohol Problem        Interval History:      Patient resting comfortably in bed this morning.  No issues or complaints.  Currently on room air with no apparent distress.  Lungs clear to auscultation bilaterally.  Abdomen soft, nontender.  Afebrile, denies diarrhea.  WBC normal at 5.13.    Review of Systems:    Constitutional: no fever, chills and night sweats.    Eyes: no eye drainage, itching or redness.  HEENT: no mouth sores, dysphagia or nose bleed.  Respiratory: no for shortness of breath, cough or production of sputum.  Cardiovascular: no chest pain, no palpitations, no orthopnea.  Gastrointestinal: no nausea, vomiting or diarrhea. No abdominal pain, hematemesis or rectal bleeding.  Genitourinary: no dysuria or polyuria.  Hematologic/lymphatic: no lymph node abnormalities, no easy bruising or easy bleeding.  Musculoskeletal: no muscle or joint pain.  Skin: No rash and no itching.  Neurological: no loss of consciousness, no seizure, no headache.  Behavioral/Psych: no depression or suicidal ideation.  Endocrine: no hot flashes.  Immunologic: negative.    ----------------------------------------------------------------------------------------------------------------------      Objective       Current [facility] Meds:  aspirin, 81 mg, Oral, Daily  atorvastatin, 40 mg, Oral, Nightly  carvedilol, 6.25 mg, Oral, BID With Meals  enoxaparin, 40 mg, Subcutaneous, Nightly  ertapenem, 1,000 mg, Intravenous, Q24H  losartan, 100 mg, Oral, Q24H  NIFEdipine CC, 30 mg, Oral, Q24H  sodium chloride, 10 mL, Intravenous, Q12H  sodium  chloride, 10 mL, Intravenous, Q12H  sodium chloride, 10 mL, Intravenous, Q12H         ----------------------------------------------------------------------------------------------------------------------    Vital Signs:  Temp:  [98.3 øF (36.8 øC)-98.6 øF (37 øC)] 98.6 øF (37 øC)  Heart Rate:  [78-85] 78  Resp:  [18-20] 20  BP: (132-135)/(90-99) 132/90  No data found.    SpO2 Percentage    08/03/23 1828 08/05/23 0610 08/06/23 1846   SpO2: 97% 96% 94%     SpO2:  [94 %] 94 %  on   ;   Device (Oxygen Therapy): room air    Body mass index is 20.82 kg/mý.  Wt Readings from Last 3 Encounters:   08/01/23 64 kg (141 lb)        Intake/Output Summary (Last 24 hours) at 8/7/2023 1155  Last data filed at 8/7/2023 0800  Gross per 24 hour   Intake 700 ml   Output --   Net 700 ml       Diet: Regular/House Diet; Texture: Regular Texture (IDDSI 7); Fluid Consistency: Thin (IDDSI 0)  ----------------------------------------------------------------------------------------------------------------------      Physical Exam:    Constitutional: Well-developed -American male resting comfortably in bed.  On room air with no apparent distress.   HENT:  Head: Normocephalic and atraumatic.  Mouth:  Moist mucous membranes.    Eyes:  Conjunctivae and EOM are normal.  No scleral icterus.  Neck:  Neck supple.  No JVD present.    Cardiovascular:  Normal rate, regular rhythm and normal heart sounds with no murmur. No edema.  Pulmonary/Chest:  No respiratory distress, no wheezes, no crackles, with normal breath sounds and good air movement.  Lungs clear to auscultation bilaterally.  On room air.  Abdominal:  Soft.  Bowel sounds are normal.  No distension and no tenderness.   Musculoskeletal:  No edema, no tenderness, and no deformity.  No swelling or redness of joints.  Neurological:  Alert and oriented to person, place, and time.  No facial droop.  No slurred speech.   Skin:  Skin is warm and dry.  No rash noted.  No pallor.   Psychiatric:   Normal mood and affect.  Behavior is normal.        ----------------------------------------------------------------------------------------------------------------------                  Results from last 7 days   Lab Units 08/07/23  0120 08/02/23  0014   WBC 10*3/mm3 5.13 4.66   HEMOGLOBIN g/dL 8.6* 8.7*   HEMATOCRIT % 28.3* 27.8*   MCV fL 83.0 81.8   MCHC g/dL 30.4* 31.3*   PLATELETS 10*3/mm3 332 180       Results from last 7 days   Lab Units 08/07/23  0120 08/02/23  0014   SODIUM mmol/L 136 136   POTASSIUM mmol/L 4.6 4.2   CHLORIDE mmol/L 103 99   CO2 mmol/L 25.2 27.0   BUN mg/dL 11 11   CREATININE mg/dL 1.33* 1.02   CALCIUM mg/dL 9.1 8.7   GLUCOSE mg/dL 129* 117*   ALBUMIN g/dL  --  2.9*   BILIRUBIN mg/dL  --  0.3   ALK PHOS U/L  --  154*   AST (SGOT) U/L  --  57*   ALT (SGPT) U/L  --  33     Estimated Creatinine Clearance: 58.8 mL/min (A) (by C-G formula based on SCr of 1.33 mg/dL (H)).  No results found for: AMMONIA    No results found for: HGBA1C, POCGLU      Lab Results   Component Value Date    HGBA1C 5.60 07/27/2023     Lab Results   Component Value Date    TSH 1.120 07/27/2023       Blood Culture   Date Value Ref Range Status   07/27/2023 Gram Negative Bacilli (C)  Preliminary   07/27/2023 Gram Negative Bacilli (C)  Preliminary     Urine Culture   Date Value Ref Range Status   07/27/2023 >100,000 CFU/mL Escherichia coli ESBL (A)  Final     Comment:       Consider infectious disease consult.  Susceptibility results may not correlate to clinical outcomes.     No results found for: WOUNDCX  No results found for: STOOLCX  No results found for: RESPCX  Pain Management Panel          Latest Ref Rng & Units 7/27/2023   Pain Management Panel   Creatinine, Urine mg/dL 47.4    Amphetamine, Urine Qual Negative Negative    Barbiturates Screen, Urine Negative Negative    Benzodiazepine Screen, Urine Negative Negative    Buprenorphine, Screen, Urine Negative Negative    Cocaine Screen, Urine Negative Negative     Fentanyl, Urine Negative Negative    Methadone Screen , Urine Negative Negative    Methamphetamine, Ur Negative Negative    ----------------------------------------------------------------------------------------------------------------------  Imaging Results (Last 24 Hours)       ** No results found for the last 24 hours. **            ----------------------------------------------------------------------------------------------------------------------    Pertinent Infectious Disease Results      Presented to [facility] Emergency Department on 7/27/2023 for chest pain and vomiting. Chlamydia and gonorrhea negative.  COVID-19 PCR negative.  Procalcitonin slightly elevated at 0.39.  Lactic acid elevated at 2.3 on admission.  Blood cultures from 7/27/2023 2 out of 2 sets positive for ESBL E. coli.  HIV negative.  Hepatitis C reactive.       Assessment/Plan       Assessment     Severe sepsis with lactic acid greater than 2 on admission  ESBL bacteremia  Possible cystitis        Plan      I saw and examined the patient myself this morning and discussed the findings and plan of care with YISSEL Gómez and got report from primary RN and here are my findings:      The patient is currently at ease, resting comfortably in bed this morning. There are no evident issues or complaints. The patient is breathing room air without any observable signs of distress. Upon auscultation, clear sounds are detected bilaterally in the lungs.    The abdomen feels soft and shows no signs of tenderness upon palpation. The patient remains afebrile and denies experiencing diarrhea. Notably, the white blood cell count is within the normal range, measuring at 5.13.    In accordance with the treatment plan, it is recommended to continue the administration of ertapenem at a dosage of 1 g intravenously every 24 hours. This course of treatment is being pursued for the purpose of addressing ESBL bacteremia with a urinary source. The  treatment plan is set to continue until 8/11/2023.      ANTIMICROBIAL THERAPY    ertapenem (INVanz) 1000 mg in 100ml NS VTB     Code Status:   Code Status and Medical Interventions:   Ordered at: 07/27/23 1358     Code Status (Patient has no pulse and is not breathing):    CPR (Attempt to Resuscitate)     Medical Interventions (Patient has pulse or is breathing):    Full Support     Release to patient:    Routine Release       YISSEL Gómez  08/07/23  11:55 EDT    Electronically signed by YISSEL Gómez, 08/07/23, 11:57 AM EDT.    Electronically signed by Simon Dumas MD, 08/07/23, 12:08 PM EDT.

## 2023-08-07 NOTE — NURSING NOTE
"RN assessed patient after previous fall, returned to nurses station. Patient's bed alarm sounded, primary RN and oncoming RN entered the room to find patient on floor. RN assessed patient to find no obvious injuries. VSS. Patient states \"I guess I passed out, I need stronger medicines now.\" Provider notified. Bed alarm reset. Call light in reach.  " none

## 2023-08-07 NOTE — NURSING NOTE
"Patient complained of nausea, RN left room to obtain medication. RN re-enters room to find patient lying on floor. RN attempted to wake patient, patient refused to answer. Patient stated \"yeah\" after approximately five attempts. RN assessed patient and called provider. No obvious injuries present. Patient is able to move all extremities and follow commands.   "

## 2023-08-07 NOTE — PLAN OF CARE
Goal Outcome Evaluation:  Plan of Care Reviewed With: patient        Progress: no change     Pt resting in bed during assessment. Pt C/O pain, prn pain meds given per order. Vital signs stable, no acute changes at this time. Will continue with pt's Plan of Care.

## 2023-08-07 NOTE — NURSING NOTE
Patient refused bed alarm. Pt educated on the importance of safety with the bed alarm. Pt refused to take a daily bath & pt was educated on the importance of a daily bath.

## 2023-08-07 NOTE — CASE MANAGEMENT/SOCIAL WORK
Discharge Planning Assessment   [place]     Patient Name: Edvin Jiménez  MRN: [id number]  Today's Date: 8/7/2023    Admit Date: 7/27/2023     Discharge Plan       Row Name 08/07/23 1253       Plan    Plan SS spoke with pt at bedside on this date. Pt states he is agreeable for either detox center or shelter in [facility]. SS contacted [facility] [phone]  per staff who states pt will need to complete a referral form located on website. SS attempted to contact [facility] [phone] and was unsuccessful. SS left voicemail to return call. SS attempted to contact [facility] [phone] without success. SS left voicemail to return call. SS to follow and assist with discharge planning.    16:48: SS contacted [facility] [place] ([place])[phone] per Jennifer who states pt application was received, but is currently pending registration at this time and could take up to 45 days to be reviewed. SS to follow.                EVELYN JaureguiW

## 2023-08-08 PROCEDURE — 25010000002 ERTAPENEM PER 500 MG: Performed by: NURSE PRACTITIONER

## 2023-08-08 PROCEDURE — 99232 SBSQ HOSP IP/OBS MODERATE 35: CPT | Performed by: STUDENT IN AN ORGANIZED HEALTH CARE EDUCATION/TRAINING PROGRAM

## 2023-08-08 PROCEDURE — 99232 SBSQ HOSP IP/OBS MODERATE 35: CPT | Performed by: NURSE PRACTITIONER

## 2023-08-08 RX ADMIN — CARVEDILOL 6.25 MG: 6.25 TABLET, FILM COATED ORAL at 09:40

## 2023-08-08 RX ADMIN — ATORVASTATIN CALCIUM 40 MG: 40 TABLET, FILM COATED ORAL at 20:33

## 2023-08-08 RX ADMIN — NIFEDIPINE 30 MG: 30 TABLET, EXTENDED RELEASE ORAL at 09:39

## 2023-08-08 RX ADMIN — ERTAPENEM SODIUM 1000 MG: 1 INJECTION INTRAMUSCULAR; INTRAVENOUS at 09:40

## 2023-08-08 RX ADMIN — ACETAMINOPHEN 650 MG: 325 TABLET ORAL at 20:33

## 2023-08-08 RX ADMIN — Medication 10 ML: at 09:42

## 2023-08-08 RX ADMIN — ASPIRIN 81 MG: 81 TABLET, CHEWABLE ORAL at 09:39

## 2023-08-08 RX ADMIN — Medication 10 ML: at 20:35

## 2023-08-08 RX ADMIN — CARVEDILOL 6.25 MG: 6.25 TABLET, FILM COATED ORAL at 17:25

## 2023-08-08 RX ADMIN — LOSARTAN POTASSIUM 100 MG: 50 TABLET, FILM COATED ORAL at 09:39

## 2023-08-08 NOTE — PLAN OF CARE
Goal Outcome Evaluation:  Plan of Care Reviewed With: patient        Progress: no change  Outcome Evaluation: Pt resting in bed during assessment. No complaints of distress noted at this time. Vital signs stable, no acute changes at this time. Will continue with Pt's Plan of Care.

## 2023-08-08 NOTE — PROGRESS NOTES
"    [facility] HOSPITALIST PROGRESS NOTE     Patient Identification:  Name:  Edvin Jiménez  Age:  52 y.o.  Sex:  male  :  1970  MRN:  [id number]  Visit Number:  [id number]  ROOM: [address] [place] [address]     Primary Care Provider:  Provider, No Known    Length of stay in inpatient status:  12    Subjective     Chief Compliant:    Chief Complaint   Patient presents with    Alcohol Problem       History of Presenting Illness:    Patient seen in follow-up for alcohol withdrawal and new ESBL E. coli bacteremia.  Patient is awake, alert and oriented x4 this morning.  There has been no medical changes throughout the past week aside from behavioral issues which include cursing, racial slurs towards staff and care team.  He hit the CODE BLUE button several times over the weekend when nurses was \"not bringing my coffee quick enough \". He is especially rude to female nurses. This has been discussed with him several times and zero tolerance policy reiterated.     Objective     Current Hospital Meds:aspirin, 81 mg, Oral, Daily  atorvastatin, 40 mg, Oral, Nightly  carvedilol, 6.25 mg, Oral, BID With Meals  enoxaparin, 40 mg, Subcutaneous, Nightly  ertapenem, 1,000 mg, Intravenous, Q24H  losartan, 100 mg, Oral, Q24H  NIFEdipine CC, 30 mg, Oral, Q24H  sodium chloride, 10 mL, Intravenous, Q12H  sodium chloride, 10 mL, Intravenous, Q12H  sodium chloride, 10 mL, Intravenous, Q12H           Current Antimicrobial Therapy:  Anti-Infectives (From admission, onward)      Ordered     Dose/Rate Route Frequency Start Stop    23 0444  ertapenem (INVanz) 1,000 mg in sodium chloride 0.9 % 100 mL IVPB-VTB        Ordering Provider: Liyah Manriquez APRN    1,000 mg  200 mL/hr over 30 Minutes Intravenous Every 24 Hours 23 0800 23 0759    23 1146  piperacillin-tazobactam (ZOSYN) IVPB 3.375 g in 100 mL NS VTB        Ordering Provider: Colby Lam DO    3.375 g  over 30 Minutes Intravenous Once 23 1202 " 07/27/23 1330          Current Diuretic Therapy:  Diuretics (From admission, onward)      None          ----------------------------------------------------------------------------------------------------------------------  Vital Signs:  Temp:  [98.3 øF (36.8 øC)-98.7 øF (37.1 øC)] 98.3 øF (36.8 øC)  Heart Rate:  [79-91] 90  Resp:  [18] 18  BP: (127-149)/() 149/113  SpO2:  [95 %] 95 %  on   ;   Device (Oxygen Therapy): room air  Body mass index is 20.82 kg/mý.    Wt Readings from Last 3 Encounters:   08/01/23 64 kg (141 lb)     Intake & Output (last 3 days)         08/05 0701  08/06 0700 08/06 0701  08/07 0700 08/07 0701 08/08 0700 08/08 0701  08/09 0700    P.O. 2000 600 1080     I.V. (mL/kg)  100 (1.6) 102.6 (1.6)     Total Intake(mL/kg) 2000 (31.3) 700 (10.9) 1182.6 (18.5)     Net +2000 +700 +1182.6             Urine Unmeasured Occurrence 12 x 4 x 7 x           Diet: Regular/House Diet; Texture: Regular Texture (IDDSI 7); Fluid Consistency: Thin (IDDSI 0)  ----------------------------------------------------------------------------------------------------------------------  Physical exam:  Constitutional: Older -American male, nontoxic, disheveled appearing, Well-developed and well-nourished, resting comfortably in bed, no acute distress.      HENT:  Head:  Normocephalic and atraumatic.  Mouth:  Moist mucous membranes.    Eyes:  Conjunctivae and EOM are normal. No scleral icterus.   Cardiovascular:  Normal rate, regular rhythm and normal heart sounds with no murmur. No JVD.   Pulmonary/Chest:  No respiratory distress, no wheezes, no crackles, with normal breath sounds and good air movement. Unlabored. No accessory muscle use.  Abdominal:  Soft. No distension and no tenderness.  Bowel sounds present. No rebound or guarding.   Musculoskeletal:  No tenderness, and no deformity.  No red or swollen joints anywhere.    Neurological:  Alert and oriented to person, place, and time.  No cranial nerve deficit.    Nonfocal.   Skin:  Skin is warm and dry. No rash noted. No pallor.   Peripheral vascular:  No clubbing, no cyanosis, no edema. Pedal and tibial pulses 2 out of 4 bilaterally.     No change from 8/7/23  Electronically signed by Christian Chavez DO, 08/08/23, 11:29 AM EDT.      ----------------------------------------------------------------------------------------------------------------------  Results from last 7 days   Lab Units 08/07/23  0120 08/02/23  0014   WBC 10*3/mm3 5.13 4.66   HEMOGLOBIN g/dL 8.6* 8.7*   HEMATOCRIT % 28.3* 27.8*   MCV fL 83.0 81.8   MCHC g/dL 30.4* 31.3*   PLATELETS 10*3/mm3 332 180         Results from last 7 days   Lab Units 08/07/23  0120 08/02/23  0014   SODIUM mmol/L 136 136   POTASSIUM mmol/L 4.6 4.2   CHLORIDE mmol/L 103 99   CO2 mmol/L 25.2 27.0   BUN mg/dL 11 11   CREATININE mg/dL 1.33* 1.02   CALCIUM mg/dL 9.1 8.7   GLUCOSE mg/dL 129* 117*   ALBUMIN g/dL  --  2.9*   BILIRUBIN mg/dL  --  0.3   ALK PHOS U/L  --  154*   AST (SGOT) U/L  --  57*   ALT (SGPT) U/L  --  33   Estimated Creatinine Clearance: 58.8 mL/min (A) (by C-G formula based on SCr of 1.33 mg/dL (H)).  No results found for: AMMONIA                  No results found for: HGBA1C, POCGLU    Lab Results   Component Value Date    TSH 1.120 07/27/2023     No results found for: PREGTESTUR, PREGSERUM, HCG, HCGQUANT  Pain Management Panel          Latest Ref Rng & Units 7/27/2023   Pain Management Panel   Creatinine, Urine mg/dL 47.4    Amphetamine, Urine Qual Negative Negative    Barbiturates Screen, Urine Negative Negative    Benzodiazepine Screen, Urine Negative Negative    Buprenorphine, Screen, Urine Negative Negative    Cocaine Screen, Urine Negative Negative    Fentanyl, Urine Negative Negative    Methadone Screen , Urine Negative Negative    Methamphetamine, Ur Negative Negative      Brief Urine Lab Results  (Last result in the past 365 days)        Color   Clarity   Blood   Leuk Est   Nitrite   Protein   CREAT    Urine HCG        07/27/23 1124             47.4         07/27/23 1124 Red  Comment: Dipstick results may be inaccurate due to color interference.       Turbid   Large (3+)   Large (3+)   Negative   100 mg/dL (2+)                 Blood Culture   Date Value Ref Range Status   07/27/2023 Abnormal Stain (C)  Preliminary   07/27/2023 Abnormal Stain (C)  Preliminary     Urine Culture   Date Value Ref Range Status   07/27/2023 >100,000 CFU/mL Gram Negative Bacilli (A)  Preliminary     No results found for: WOUNDCX  No results found for: STOOLCX  No results found for: RESPCX  No results found for: AFBCX          I have personally looked at the labs and they are summarized above.  ----------------------------------------------------------------------------------------------------------------------  Detailed radiology reports for the last 24 hours:  Imaging Results (Last 24 Hours)       ** No results found for the last 24 hours. **          Assessment & Plan      Patient is a 52-year-old -American male with history significant for chronic alcohol abuse and hypertension who presented to the ER for alcohol detox.    #Sepsis due to ESBL E. coli bacteremia  #Urinary tract infection ESBL E. coli, POA  --Initial Tmax 101 in the ER, initially suspected to be withdrawal fever however cultures quickly grew out ESBL E. coli.  Tmax overnight 100.7.  --Admit labs: Lactic 2.3, Pro-Quang 0.4  --CT abdomen/pelvis noted cystitis  --UA with 3+ hematuria, 3+ leuks, +1 g protein but no bacteria?  --Urine culture ESBL E. coli  --Blood cultures 2/2 positive for ESBL E. Coli  --Repeat culture 7/28 NGTD  --Continue ertapenem, end date 8/11 am  --Infectious disease following, appreciate recommendations    #Acute alcohol withdrawal, exaggerated for secondary gain  #Drug-seeking behavior  #Elevated transaminases  #Chronic pancreatitis  #Alcoholic cirrhosis, early  --Patient presented to the ER for alcohol detox.  Patient stated he usually drinks  "\"6 quarts\" of Melquiades Beam or \"whatever I can get \". Started drinking heavily approximately 2 years ago.  Last drink on 7/26-allegedly  --Admit serum alcohol negative  --no steroids d/t maddreys score  --Labs: AST//73, T. bili 0.8, alk phos 190, INR 1  --CT w/out evidence of cirrhosis  --Issues over the weekend -tolerating p.o. diet without any issue-despite repetitive complaints of abdominal pain requesting specifically IV morphine or Dilaudid-this happened on 7/28 after he consumed 3 turkey boxes without any nausea or vomiting-has had no issues since  --liver US tiny amount of ascites  --prn bowel regimen, prn lactulose-has been having 2-3 bms daily  --Continue p.o. Librium taper, continue thiamine, multivitamins and folate    #Microcytic anemia, suspect chronic  #Thrombocytopenia  --Hemoglobin 10.1, MCV 78, suspect chronic but no prior labs available.  Thrombocytopenia likely multifactorial due to alcohol abuse versus sepsis mediated due to ESBL bacteremia  --Hemoglobin 8.2 this a.m., only form of ABL was hematuria    #Hypertensive urgency, POA  --Likely has hypertension at baseline and is untreated due to medication none compliance and nonadherence  --Continue Coreg to 6.25 twice daily + increased new losartan (proteinuria), as needed labetalol, will adjust p.o. antihypertensive regimen as needed-if he remains hypertensive, would add nifedipine next    #NSTEMI, type 2  #Atypical chest pain  --due to the above. Patient c/0 of atypical chest pain on admission, asymptomatic since  --Initial troponin 24, repeat 22 with a -2 delta   --EKG noted NSR without acute ST or T wave changes  --Echocardiogram noted an EF of 51 to 55%  --Continue beta-blocker, aspirin/statin, ARB  --Outpatient cardiology follow-up    #Pulmonary nodule, needs outpatient follow-up in 3 months  #Proteinuria, protein creatinine ratio 1.5 g, continue new losartan  #Ascending aortic ectasia 3.9 cm noted on CT angiogram, outpatient follow-up  #HCV " antibody positive, RNA quant sent  #Chronic pancreatitis due to EtOH use, supportive care  #Hypovolemic hyponatremia, not clinically significant  #Hypomagnesemia, replace per protocol    CHECKLIST:  Abx: Invanz  VTE: Lovenox  GI ppx: PPI  Diet: Consistent carb  Code: CPR, full  Dispo: Patient will be discharged when antibiotics are complete on 8/11 am.     Christian Chavez DO  [facility] Hospitalist  08/08/23  11:28 EDT

## 2023-08-08 NOTE — PROGRESS NOTES
PROGRESS NOTE         Patient Identification:  Name:  Edvin Jiménez  Age:  52 y.o.  Sex:  male  :  1970  MRN:  [id number]  Visit Number:  [id number]  Primary Care Provider:  Provider, No Known         LOS: 12 days       ----------------------------------------------------------------------------------------------------------------------  Subjective       Chief Complaints:    Alcohol Problem        Interval History:      Patient resting comfortably in bed this morning.  Drowsy and sleepy this morning.  No issues or complaints.  Currently on room air with no apparent distress.  Lungs clear to auscultation bilaterally.  Abdomen soft, nontender.  Afebrile, no reported diarrhea.    Review of Systems:    Constitutional: no fever, chills and night sweats.    Eyes: no eye drainage, itching or redness.  HEENT: no mouth sores, dysphagia or nose bleed.  Respiratory: no for shortness of breath, cough or production of sputum.  Cardiovascular: no chest pain, no palpitations, no orthopnea.  Gastrointestinal: no nausea, vomiting or diarrhea. No abdominal pain, hematemesis or rectal bleeding.  Genitourinary: no dysuria or polyuria.  Hematologic/lymphatic: no lymph node abnormalities, no easy bruising or easy bleeding.  Musculoskeletal: no muscle or joint pain.  Skin: No rash and no itching.  Neurological: no loss of consciousness, no seizure, no headache.  Behavioral/Psych: no depression or suicidal ideation.  Endocrine: no hot flashes.  Immunologic: negative.    ----------------------------------------------------------------------------------------------------------------------      Objective       Current [facility] Meds:  aspirin, 81 mg, Oral, Daily  atorvastatin, 40 mg, Oral, Nightly  carvedilol, 6.25 mg, Oral, BID With Meals  enoxaparin, 40 mg, Subcutaneous, Nightly  ertapenem, 1,000 mg, Intravenous, Q24H  losartan, 100 mg, Oral, Q24H  NIFEdipine CC, 30 mg, Oral, Q24H  sodium chloride, 10 mL,  Intravenous, Q12H  sodium chloride, 10 mL, Intravenous, Q12H  sodium chloride, 10 mL, Intravenous, Q12H         ----------------------------------------------------------------------------------------------------------------------    Vital Signs:  Temp:  [98.3 øF (36.8 øC)-98.7 øF (37.1 øC)] 98.3 øF (36.8 øC)  Heart Rate:  [79-91] 90  Resp:  [18] 18  BP: (127-149)/() 149/113  No data found.    SpO2 Percentage    08/05/23 0610 08/06/23 1846 08/07/23 1851   SpO2: 96% 94% 95%     SpO2:  [95 %] 95 %  on   ;   Device (Oxygen Therapy): room air    Body mass index is 20.82 kg/mý.  Wt Readings from Last 3 Encounters:   08/01/23 64 kg (141 lb)        Intake/Output Summary (Last 24 hours) at 8/8/2023 0946  Last data filed at 8/8/2023 0345  Gross per 24 hour   Intake 942.6 ml   Output --   Net 942.6 ml       Diet: Regular/House Diet; Texture: Regular Texture (IDDSI 7); Fluid Consistency: Thin (IDDSI 0)  ----------------------------------------------------------------------------------------------------------------------      Physical Exam:    Constitutional: Well-developed -American male resting comfortably in bed.  On room air with no apparent distress.  Drowsy, sleepy this morning.  HENT:  Head: Normocephalic and atraumatic.  Mouth:  Moist mucous membranes.    Eyes:  Conjunctivae and EOM are normal.  No scleral icterus.  Neck:  Neck supple.  No JVD present.    Cardiovascular:  Normal rate, regular rhythm and normal heart sounds with no murmur. No edema.  Pulmonary/Chest:  No respiratory distress, no wheezes, no crackles, with normal breath sounds and good air movement.  Lungs clear to auscultation bilaterally.  On room air.  Abdominal:  Soft.  Bowel sounds are normal.  No distension and no tenderness.   Musculoskeletal:  No edema, no tenderness, and no deformity.  No swelling or redness of joints.  Neurological:  Alert and oriented to person, place, and time.  No facial droop.  No slurred speech.   Skin:  Skin  is warm and dry.  No rash noted.  No pallor.   Psychiatric:  Normal mood and affect.  Behavior is normal.        ----------------------------------------------------------------------------------------------------------------------                  Results from last 7 days   Lab Units 08/07/23  0120 08/02/23  0014   WBC 10*3/mm3 5.13 4.66   HEMOGLOBIN g/dL 8.6* 8.7*   HEMATOCRIT % 28.3* 27.8*   MCV fL 83.0 81.8   MCHC g/dL 30.4* 31.3*   PLATELETS 10*3/mm3 332 180       Results from last 7 days   Lab Units 08/07/23  0120 08/02/23  0014   SODIUM mmol/L 136 136   POTASSIUM mmol/L 4.6 4.2   CHLORIDE mmol/L 103 99   CO2 mmol/L 25.2 27.0   BUN mg/dL 11 11   CREATININE mg/dL 1.33* 1.02   CALCIUM mg/dL 9.1 8.7   GLUCOSE mg/dL 129* 117*   ALBUMIN g/dL  --  2.9*   BILIRUBIN mg/dL  --  0.3   ALK PHOS U/L  --  154*   AST (SGOT) U/L  --  57*   ALT (SGPT) U/L  --  33     Estimated Creatinine Clearance: 58.8 mL/min (A) (by C-G formula based on SCr of 1.33 mg/dL (H)).  No results found for: AMMONIA    No results found for: HGBA1C, POCGLU      Lab Results   Component Value Date    HGBA1C 5.60 07/27/2023     Lab Results   Component Value Date    TSH 1.120 07/27/2023       Blood Culture   Date Value Ref Range Status   07/27/2023 Gram Negative Bacilli (C)  Preliminary   07/27/2023 Gram Negative Bacilli (C)  Preliminary     Urine Culture   Date Value Ref Range Status   07/27/2023 >100,000 CFU/mL Escherichia coli ESBL (A)  Final     Comment:       Consider infectious disease consult.  Susceptibility results may not correlate to clinical outcomes.     No results found for: WOUNDCX  No results found for: STOOLCX  No results found for: RESPCX  Pain Management Panel          Latest Ref Rng & Units 7/27/2023   Pain Management Panel   Creatinine, Urine mg/dL 47.4    Amphetamine, Urine Qual Negative Negative    Barbiturates Screen, Urine Negative Negative    Benzodiazepine Screen, Urine Negative Negative    Buprenorphine, Screen, Urine Negative  Negative    Cocaine Screen, Urine Negative Negative    Fentanyl, Urine Negative Negative    Methadone Screen , Urine Negative Negative    Methamphetamine, Ur Negative Negative    ----------------------------------------------------------------------------------------------------------------------  Imaging Results (Last 24 Hours)       ** No results found for the last 24 hours. **            ----------------------------------------------------------------------------------------------------------------------    Pertinent Infectious Disease Results      Presented to [facility] on 7/27/2023 for chest pain and vomiting. Chlamydia and gonorrhea negative.  COVID-19 PCR negative.  Procalcitonin slightly elevated at 0.39.  Lactic acid elevated at 2.3 on admission.  Blood cultures from 7/27/2023 2 out of 2 sets positive for ESBL E. coli.  HIV negative.  Hepatitis C reactive.       Assessment/Plan       Assessment     Severe sepsis with lactic acid greater than 2 on admission  ESBL bacteremia  Possible cystitis        Plan        Patient resting comfortably in bed this morning.  Drowsy and sleepy this morning.  No issues or complaints.  Currently on room air with no apparent distress.  Lungs clear to auscultation bilaterally.  Abdomen soft, nontender.  Afebrile, no reported diarrhea.    We recommend to continue ertapenem 1 g intravenously every 24 hours. This course of treatment is being pursued for the purpose of addressing ESBL bacteremia with a urinary source. The treatment plan is set to continue until 8/11/2023.      ANTIMICROBIAL THERAPY    ertapenem (INVanz) 1000 mg in 100ml NS VTB     Code Status:   Code Status and Medical Interventions:   Ordered at: 07/27/23 1358     Code Status (Patient has no pulse and is not breathing):    CPR (Attempt to Resuscitate)     Medical Interventions (Patient has pulse or is breathing):    Full Support     Release to patient:    Routine Release        Liyah Manriquez, YISSEL  08/08/23  09:33 EDT

## 2023-08-08 NOTE — PLAN OF CARE
Goal Outcome Evaluation:  Plan of Care Reviewed With: patient        Progress: no change  Outcome Evaluation: Pt rested well throughout the day. Pt ambulated in room multiple times this shift, tolerated well. Pt has been pleasant this shift with no agitation outbursts noted at this time. Pt had no complaints or acute changes noted. Will continue to follow plan of care.

## 2023-08-08 NOTE — PROGRESS NOTES
Antimicrobial Length of Therapy:    Day 12 of 14 Ertapenem     Jarad Gallego, Sameer  08/08/23  09:05 EDT

## 2023-08-09 ENCOUNTER — READMISSION MANAGEMENT (OUTPATIENT)
Dept: CALL CENTER | Facility: HOSPITAL | Age: 53
End: 2023-08-09

## 2023-08-09 VITALS
WEIGHT: 141 LBS | RESPIRATION RATE: 18 BRPM | OXYGEN SATURATION: 98 % | TEMPERATURE: 98.7 F | DIASTOLIC BLOOD PRESSURE: 99 MMHG | BODY MASS INDEX: 20.88 KG/M2 | SYSTOLIC BLOOD PRESSURE: 132 MMHG | HEIGHT: 69 IN | HEART RATE: 97 BPM

## 2023-08-09 PROCEDURE — 99232 SBSQ HOSP IP/OBS MODERATE 35: CPT | Performed by: INTERNAL MEDICINE

## 2023-08-09 PROCEDURE — 63710000001 ONDANSETRON ODT 4 MG TABLET DISPERSIBLE: Performed by: STUDENT IN AN ORGANIZED HEALTH CARE EDUCATION/TRAINING PROGRAM

## 2023-08-09 PROCEDURE — 99239 HOSP IP/OBS DSCHRG MGMT >30: CPT | Performed by: STUDENT IN AN ORGANIZED HEALTH CARE EDUCATION/TRAINING PROGRAM

## 2023-08-09 RX ORDER — NIFEDIPINE 30 MG/1
30 TABLET, FILM COATED, EXTENDED RELEASE ORAL
Qty: 30 TABLET | Refills: 0 | Status: SHIPPED | OUTPATIENT
Start: 2023-08-10

## 2023-08-09 RX ORDER — NITROGLYCERIN 0.4 MG/1
0.4 TABLET SUBLINGUAL
Qty: 25 TABLET | Refills: 12 | Status: SHIPPED | OUTPATIENT
Start: 2023-08-09

## 2023-08-09 RX ORDER — ATORVASTATIN CALCIUM 40 MG/1
40 TABLET, FILM COATED ORAL NIGHTLY
Qty: 30 TABLET | Refills: 0 | Status: SHIPPED | OUTPATIENT
Start: 2023-08-09

## 2023-08-09 RX ORDER — LOSARTAN POTASSIUM 100 MG/1
100 TABLET ORAL
Qty: 30 TABLET | Refills: 0 | Status: SHIPPED | OUTPATIENT
Start: 2023-08-09

## 2023-08-09 RX ORDER — CARVEDILOL 6.25 MG/1
6.25 TABLET ORAL 2 TIMES DAILY WITH MEALS
Qty: 60 TABLET | Refills: 0 | Status: SHIPPED | OUTPATIENT
Start: 2023-08-09 | End: 2023-09-08

## 2023-08-09 RX ORDER — ASPIRIN 81 MG/1
81 TABLET, CHEWABLE ORAL DAILY
Qty: 30 TABLET | Refills: 0 | Status: SHIPPED | OUTPATIENT
Start: 2023-08-09

## 2023-08-09 RX ORDER — IBUPROFEN 800 MG/1
800 TABLET ORAL EVERY 6 HOURS PRN
Qty: 90 TABLET | Refills: 0 | Status: SHIPPED | OUTPATIENT
Start: 2023-08-09

## 2023-08-09 RX ADMIN — ACETAMINOPHEN 650 MG: 325 TABLET ORAL at 20:41

## 2023-08-09 RX ADMIN — ASPIRIN 81 MG: 81 TABLET, CHEWABLE ORAL at 08:27

## 2023-08-09 RX ADMIN — ATORVASTATIN CALCIUM 40 MG: 40 TABLET, FILM COATED ORAL at 20:41

## 2023-08-09 RX ADMIN — ONDANSETRON 4 MG: 4 TABLET, ORALLY DISINTEGRATING ORAL at 20:41

## 2023-08-09 RX ADMIN — CARVEDILOL 6.25 MG: 6.25 TABLET, FILM COATED ORAL at 17:26

## 2023-08-09 RX ADMIN — LOSARTAN POTASSIUM 100 MG: 50 TABLET, FILM COATED ORAL at 08:26

## 2023-08-09 RX ADMIN — IBUPROFEN 800 MG: 400 TABLET, FILM COATED ORAL at 23:17

## 2023-08-09 RX ADMIN — CARVEDILOL 6.25 MG: 6.25 TABLET, FILM COATED ORAL at 08:26

## 2023-08-09 RX ADMIN — NIFEDIPINE 30 MG: 30 TABLET, EXTENDED RELEASE ORAL at 08:27

## 2023-08-09 RX ADMIN — Medication 10 ML: at 08:28

## 2023-08-09 NOTE — PLAN OF CARE
Problem: Adult Inpatient Plan of Care  Goal: Plan of Care Review  Outcome: Ongoing, Progressing  Flowsheets  Taken 8/9/2023 0442 by Martina Gardiner, RN  Progress: improving  Outcome Evaluation: Pt has rested in bed well throughout the shift. VSS. Ambulated in room independently.  Taken 8/8/2023 1732 by Brittanie Sams, RN  Plan of Care Reviewed With: patient   Goal Outcome Evaluation:           Progress: improving  Outcome Evaluation: Pt has rested in bed well throughout the shift. VSS. Ambulated in room independently.

## 2023-08-09 NOTE — PROGRESS NOTES
PROGRESS NOTE         Patient Identification:  Name:  Edvin Jiménez  Age:  52 y.o.  Sex:  male  :  1970  MRN:  [id number]  Visit Number:  [id number]  Primary Care Provider:  Provider, No Known         LOS: 13 days       ----------------------------------------------------------------------------------------------------------------------  Subjective       Chief Complaints:    Alcohol Problem        Interval History:      Patient seems to be stable from infectious disease standpoint with no fever or diarrhea reported overnight but was noted to be yelling at nursing staff and seems to be angry.  No acute distress slept well last night.    Review of Systems:    Constitutional: no fever, chills and night sweats.    Eyes: no eye drainage, itching or redness.  HEENT: no mouth sores, dysphagia or nose bleed.  Respiratory: no for shortness of breath, cough or production of sputum.  Cardiovascular: no chest pain, no palpitations, no orthopnea.  Gastrointestinal: no nausea, vomiting or diarrhea. No abdominal pain, hematemesis or rectal bleeding.  Genitourinary: no dysuria or polyuria.  Hematologic/lymphatic: no lymph node abnormalities, no easy bruising or easy bleeding.  Musculoskeletal: no muscle or joint pain.  Skin: No rash and no itching.  Neurological: no loss of consciousness, no seizure, no headache.      ----------------------------------------------------------------------------------------------------------------------      Objective       Current Hospital Meds:  aspirin, 81 mg, Oral, Daily  atorvastatin, 40 mg, Oral, Nightly  carvedilol, 6.25 mg, Oral, BID With Meals  enoxaparin, 40 mg, Subcutaneous, Nightly  ertapenem, 1,000 mg, Intravenous, Q24H  losartan, 100 mg, Oral, Q24H  NIFEdipine CC, 30 mg, Oral, Q24H  sodium chloride, 10 mL, Intravenous, Q12H  sodium chloride, 10 mL, Intravenous, Q12H  sodium chloride, 10 mL, Intravenous, Q12H          ----------------------------------------------------------------------------------------------------------------------    Vital Signs:  Temp:  [98.4 øF (36.9 øC)-98.8 øF (37.1 øC)] 98.4 øF (36.9 øC)  Heart Rate:  [75-90] 75  Resp:  [18] 18  BP: (122-150)/() 150/100  No data found.    SpO2 Percentage    08/06/23 1846 08/07/23 1851 08/08/23 1846   SpO2: 94% 95% 98%     SpO2:  [98 %] 98 %  on   ;   Device (Oxygen Therapy): room air    Body mass index is 20.82 kg/mý.  Wt Readings from Last 3 Encounters:   08/01/23 64 kg (141 lb)        Intake/Output Summary (Last 24 hours) at 8/9/2023 0711  Last data filed at 8/9/2023 0300  Gross per 24 hour   Intake 1080 ml   Output --   Net 1080 ml       Diet: Regular/House Diet; Texture: Regular Texture (IDDSI 7); Fluid Consistency: Thin (IDDSI 0)  ----------------------------------------------------------------------------------------------------------------------      Physical Exam:    Constitutional: No apparent distress and reported to me that he is going to be transferred to a detox center in [place] for alcohol abuse.  No other complaints at this time.  HENT:  Head: Normocephalic and atraumatic.  Mouth:  Moist mucous membranes.    Eyes:  Conjunctivae and EOM are normal.  No scleral icterus.  Neck:  Neck supple.  No JVD present.    Cardiovascular:  Normal rate, regular rhythm and normal heart sounds with no murmur. No edema.  Pulmonary/Chest:  No respiratory distress, no wheezes, no crackles, with normal breath sounds and good air movement.  Lungs clear to auscultation bilaterally.  On room air.  Abdominal:  Soft.  Bowel sounds are normal.  No distension and no tenderness.   Musculoskeletal:  No edema, no tenderness, and no deformity.  No swelling or redness of joints.  Neurological:  Alert and oriented to person, place, and time.  No facial droop.  No slurred speech.   Skin:  Skin is warm and dry.  No rash noted.  No pallor.          ----------------------------------------------------------------------------------------------------------------------                  Results from last 7 days   Lab Units 08/07/23  0120   WBC 10*3/mm3 5.13   HEMOGLOBIN g/dL 8.6*   HEMATOCRIT % 28.3*   MCV fL 83.0   MCHC g/dL 30.4*   PLATELETS 10*3/mm3 332       Results from last 7 days   Lab Units 08/07/23  0120   SODIUM mmol/L 136   POTASSIUM mmol/L 4.6   CHLORIDE mmol/L 103   CO2 mmol/L 25.2   BUN mg/dL 11   CREATININE mg/dL 1.33*   CALCIUM mg/dL 9.1   GLUCOSE mg/dL 129*     Estimated Creatinine Clearance: 58.8 mL/min (A) (by C-G formula based on SCr of 1.33 mg/dL (H)).  No results found for: AMMONIA    No results found for: HGBA1C, POCGLU      Lab Results   Component Value Date    HGBA1C 5.60 07/27/2023     Lab Results   Component Value Date    TSH 1.120 07/27/2023       Blood Culture   Date Value Ref Range Status   07/27/2023 Gram Negative Bacilli (C)  Preliminary   07/27/2023 Gram Negative Bacilli (C)  Preliminary     Urine Culture   Date Value Ref Range Status   07/27/2023 >100,000 CFU/mL Escherichia coli ESBL (A)  Final     Comment:       Consider infectious disease consult.  Susceptibility results may not correlate to clinical outcomes.     No results found for: WOUNDCX  No results found for: STOOLCX  No results found for: RESPCX  Pain Management Panel          Latest Ref Rng & Units 7/27/2023   Pain Management Panel   Creatinine, Urine mg/dL 47.4    Amphetamine, Urine Qual Negative Negative    Barbiturates Screen, Urine Negative Negative    Benzodiazepine Screen, Urine Negative Negative    Buprenorphine, Screen, Urine Negative Negative    Cocaine Screen, Urine Negative Negative    Fentanyl, Urine Negative Negative    Methadone Screen , Urine Negative Negative    Methamphetamine, Ur Negative Negative    ----------------------------------------------------------------------------------------------------------------------  Imaging Results (Last 24  Hours)       ** No results found for the last 24 hours. **            ----------------------------------------------------------------------------------------------------------------------    Pertinent Infectious Disease Results      Presented to [facility] Emergency Department on 7/27/2023 for chest pain and vomiting. Chlamydia and gonorrhea negative.  COVID-19 PCR negative.  Procalcitonin slightly elevated at 0.39.  Lactic acid elevated at 2.3 on admission.  Blood cultures from 7/27/2023 2 out of 2 sets positive for ESBL E. coli.  HIV negative.  Hepatitis C reactive.       Assessment/Plan       Assessment     Severe sepsis with lactic acid greater than 2 on admission  ESBL bacteremia  Possible cystitis        Plan      Overall patient seems to be stable from infectious disease and showing improvement with no evidence of adverse reaction to antibiotic therapy with normal white count but no fever or diarrhea reported.    Blood cultures repeated on 7/28/2023 are finalized as no growth at 5 days and would recommend to continue with ertapenem 1 g IV every 24 hours through today August 9, 2023.    I went ahead and discontinued ertapenem and would recommend to follow closely off antibiotic coverage.      ANTIMICROBIAL THERAPY    ertapenem (INVanz) 1000 mg in 100ml NS VTB     Code Status:   Code Status and Medical Interventions:   Ordered at: 07/27/23 1358     Code Status (Patient has no pulse and is not breathing):    CPR (Attempt to Resuscitate)     Medical Interventions (Patient has pulse or is breathing):    Full Support     Release to patient:    Routine Release       Simon Dumas MD  08/09/23  07:11 EDT

## 2023-08-09 NOTE — DISCHARGE SUMMARY
"    [facility] HOSPITALISTS DISCHARGE SUMMARY    Patient Identification:  Name:  Edvin Jiménez  Age:  52 y.o.  Sex:  male  :  1970  MRN:  [id number]  Visit Number:  [id number]    Date of Admission: 2023  Date of Discharge:  2023     PCP: Provider, No Known    DISCHARGE DIAGNOSIS  #Sepsis due to ESBL E. coli bacteremia  #Urinary tract infection ESBL E. coli, POA  #Acute alcohol withdrawal, exaggerated for secondary gain  #Drug-seeking behavior  #Elevated transaminases  #Chronic pancreatitis  #Alcoholic cirrhosis, early  #Microcytic anemia, suspect chronic  #Thrombocytopenia  #Hypertensive urgency, POA   #NSTEMI, type 2  #Atypical chest pain  #Pulmonary nodule, needs outpatient follow-up in 3 months  #Proteinuria  #Ascending aortic ectasia  #HCV antibody positive  #Chronic pancreatitis due to EtOH use  #Hypovolemic hyponatremia, not clinically significant  #Hypomagnesemia    CONSULTS   Psychiatry  Infectious disease    PROCEDURES PERFORMED  None    HOSPITAL COURSE  Patient is a 52 y.o. male presented to [facility] complaining of vomiting, chest pain with active alcohol use.  Please see the admitting history and physical for further details.  On admission, patient stated he usually drinks \"6 quarts of Melquiades Beam or whatever I can get \"and that his last drink was the night prior to admission.  He was initially admitted to CCU for alcohol detox however with close monitoring, it was determined that his symptoms were exaggerated for secondary gain as he persistently demanded IV Ativan and seemed to have the CIWA scoring system memorized.  In regards to the alcohol abuse, he was switched to a Librium taper which was completed and he was moved from CCU to [facility].  Patient would have been discharged the following morning however blood cultures were positive for ESBL E. coli with the source being from the urine.    Prior to being moved from CCU, patient was verbally abusive, " "aggressive and yelled and cursed at staff throughout the night.  He made several statements regarding \"I am tired of you all skinny White whores\", claimed everyone that worked at this facility was racist and the only reason we would not give him IV pain medication and Ativan was because he was \"black\".  These actions and statements were discussed with him after the fact and literally on a daily basis for almost 2 weeks.  His hospital course was uncomplicated from a medical standpoint but patient consistently was rude, aggressive and verbally assaulted all staff who care for him.  Patient hit the \"CODE BLUE\" button on several different occasions because he did not think the staff was \"getting me my coffee quick enough\" . As there were no oral options for treatment of the bacteremia in the setting of him being homeless without any access to an appropriate disposition plan, we had the pleasure of caring for this patient for almost 2 weeks.    On the day of discharge, he was evaluated by infectious disease who noted antibiotic course have been complete he was safe for discharge with recommendations for follow-up.  Patient had no family or friends who would provide him a ride.  He relied on the hospital administration to arrange a taxicab to the nearest [employer] station, admin purchased a [employer] ticket for him to return home.  Despite these hospitality's, patient continued to be irate, verbally abusive to staff throughout the day of discharge because \"hurry up and get me the fuck out of here \".    Patient was hemodynamically stable at discharge.  Recommended to follow-up with PCP regarding aortic ectasia.  Recommended to follow-up with pulmonology regarding nodule noted on CT.  He was not referred to our clinic as he is not from this area and does not plan on returning. Recommended to follow up with outpatient cardiology and take medications as prescribed.    VITAL SIGNS:  Temp:  [98.4 øF (36.9 øC)-98.8 øF (37.1 øC)] " 98.4 øF (36.9 øC)  Heart Rate:  [75-90] 75  Resp:  [18] 18  BP: (122-150)/() 150/100  SpO2:  [98 %] 98 %  on   ;   Device (Oxygen Therapy): room air    Body mass index is 20.82 kg/mý.  Wt Readings from Last 3 Encounters:   08/01/23 64 kg (141 lb)       PHYSICAL EXAM:  Constitutional: Older -American male, nontoxic, disheveled appearing, Well-developed and well-nourished, resting comfortably in bed, no acute distress.      HENT:  Head:  Normocephalic and atraumatic.  Mouth:  Moist mucous membranes.    Eyes:  Conjunctivae and EOM are normal. No scleral icterus.   Cardiovascular:  Normal rate, regular rhythm and normal heart sounds with no murmur. No JVD.   Pulmonary/Chest:  No respiratory distress, no wheezes, no crackles, with normal breath sounds and good air movement. Unlabored. No accessory muscle use.  Abdominal:  Soft. No distension and no tenderness.  Bowel sounds present. No rebound or guarding.   Musculoskeletal:  No tenderness, and no deformity.  No red or swollen joints anywhere.    Neurological:  Alert and oriented to person, place, and time. Nonfocal.   Skin:  Skin is warm and dry. No rash noted. No pallor.   Peripheral vascular:  No clubbing, no cyanosis, no edema. Pedal and tibial pulses 2 out of 4 bilaterally.     DISCHARGE DISPOSITION   Stable    DISCHARGE MEDICATIONS:     Discharge Medications        New Medications        Instructions Start Date   aspirin 81 MG chewable tablet   81 mg, Oral, Daily      atorvastatin 40 MG tablet  Commonly known as: LIPITOR   40 mg, Oral, Nightly      carvedilol 6.25 MG tablet  Commonly known as: COREG   6.25 mg, Oral, 2 Times Daily With Meals      ibuprofen 800 MG tablet  Commonly known as: ADVIL,MOTRIN   800 mg, Oral, Every 6 Hours PRN      losartan 100 MG tablet  Commonly known as: COZAAR   100 mg, Oral, Every 24 Hours Scheduled      nitroglycerin 0.4 MG SL tablet  Commonly known as: NITROSTAT   0.4 mg, Sublingual, Every 5 Minutes PRN, Take no more  than 3 doses in 15 minutes.                 Additional Instructions for the Follow-ups that You Need to Schedule       Discharge Follow-up with PCP   As directed       Currently Documented PCP:    Provider, No Known    PCP Phone Number:    [phone]     Follow Up Details: 1wk follow up        Discharge Follow-up with Specialty: ID; 1 Week   As directed      Specialty: ID   Follow Up: 1 Week   Follow Up Details: f/U ESBL bacteremia               Follow-up Information       Provider, No Known .    Why: 1wk follow up  Contact information:  [facility] [id number]  [phone]                              TEST  RESULTS PENDING AT DISCHARGE  none     CODE STATUS  Code Status and Medical Interventions:   Ordered at: 07/27/23 1358     Code Status (Patient has no pulse and is not breathing):    CPR (Attempt to Resuscitate)     Medical Interventions (Patient has pulse or is breathing):    Full Support     Release to patient:    Routine Release       The ASCVD Risk score (Filomena AHMADI, et al., 2019) failed to calculate for the following reasons:    The valid HDL cholesterol range is 20 to 100 mg/dL     Christian Chavez DO  [facility] Hospitalist  08/09/23  07:51 EDT    Please note that this discharge summary required more than 30 minutes to complete.

## 2023-08-09 NOTE — CASE MANAGEMENT/SOCIAL WORK
Discharge Planning Assessment  [facility]     Patient Name: Edvin Jiménez  MRN: [id number]  Today's Date: 8/9/2023    Admit Date: 7/27/2023     Discharge Plan       Row Name 08/09/23 0850       Plan    Final Discharge Disposition Code 01 - home or self-care    Final Note Pt has a discharge order. SS discussed transportation with SS , Tiffanie Good. SS will attempt to arrange transportation via [employer] bus back to [place], [place] with 's approval. SS to follow.    9:06: SS reviewed [employer] bus availability from [facility] in [place], [place] to [place], [place]. [employer]ound bus leaves from [facility] in [place], [place] on 8/10/23 at 12:55 am and will arrive in [place], [place] at 14:25 pm on 8/10/23. SS contacted [employer] [phone] per Alvin who states they are available 24 hours per day and will be able to transport pt later this date to [facility] in [place], [place]. [employer] cost will be $37.10. SS notified SS . [employer] will be arranging [employer] bus ticket. SS will contact [employer] back after [employer] bus ticket is arranged. SS to follow.     11:10: SS spoke to   and executive assist is printing [employer] bus ticket. SS to follow.     12:14: SS received [employer] bus ticket and contacted [employer] [phone] per Adriana to arrange transport to bus stop at [facility]. SS provided [employer] bus ticket to pt and provided transportation arrangements. [employer] will pick pt up between 11:00-11:30. SS notified lead RN and RN. No other needs identified.                Expected Discharge Date and Time       Expected Discharge Date Expected Discharge Time    Aug 9, 2023         DIPTI Bailon

## 2023-08-10 NOTE — PLAN OF CARE
Goal Outcome Evaluation:           Progress: improving  Outcome Evaluation: Pt has rested in bed well throughout the shift. VSS. Ambulated in room independently.

## 2023-08-10 NOTE — NURSING NOTE
Patient's IV was removed per his RN and his medication was given to him. I accompanied him via wheelchair to the ED entrance to meet [employer].

## 2023-08-10 NOTE — OUTREACH NOTE
Prep Survey      Flowsheet Row Responses   [facility] facility patient discharged from? [facility]   Is LACE score < 7 ? No   Eligibility Not Eligible   What are the reasons patient is not eligible? Other  [ETOH abuse]   Does the patient have one of the following disease processes/diagnoses(primary or secondary)? Sepsis   Prep survey completed? Yes            Vonda CONNELL - Registered Nurse